# Patient Record
Sex: FEMALE | Race: BLACK OR AFRICAN AMERICAN | NOT HISPANIC OR LATINO | Employment: FULL TIME | URBAN - METROPOLITAN AREA
[De-identification: names, ages, dates, MRNs, and addresses within clinical notes are randomized per-mention and may not be internally consistent; named-entity substitution may affect disease eponyms.]

---
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
## 2021-03-15 DIAGNOSIS — G43.009 MIGRAINE WITHOUT AURA AND WITHOUT STATUS MIGRAINOSUS, NOT INTRACTABLE: ICD-10-CM

## 2021-03-15 NOTE — TELEPHONE ENCOUNTER
Received refill request for Nortriptyline from [facility]; Patient was last seen in October and has follow up appointment in April with Dr. Wilson. Pended to Dr. Wilson for signature and will be electronically sent to pharmacy if approved.    Daniela Medina RN

## 2021-03-16 RX ORDER — NORTRIPTYLINE HYDROCHLORIDE 50 MG/1
100 CAPSULE ORAL AT BEDTIME
Qty: 60 CAPSULE | Refills: 5 | Status: SHIPPED | OUTPATIENT
Start: 2021-03-16 | End: 2021-11-11

## 2021-03-19 ENCOUNTER — TELEPHONE (OUTPATIENT)
Dept: NEUROLOGY | Facility: CLINIC | Age: 21
End: 2021-03-19

## 2021-03-19 NOTE — TELEPHONE ENCOUNTER
[facility] Call Center    Phone Message    May a detailed message be left on voicemail: yes     Reason for Call: Order(s): Other:   Reason for requested: Sivan is calling on behalf of patient PCP Dr. Danae Pinon from [facility] requesting to have Dr. Morales/care team call her back. Patients PCP is wondering if there is a specific provider that would be recommended for patient to have neuropsych testing/evaluation for patients symptoms of inattention at school and though process related to her MS.    Please call to advise.    Action Taken: Message routed to:  [facility] ([facility]  MS    Travel Screening: Not Applicable

## 2021-03-22 NOTE — TELEPHONE ENCOUNTER
I left KATHRYN for Sivan advising that we do have a neuropsych team at our clinic that works with many of our patients, but that the patient doesn't necessarily need to be seen by them.    Kari Billy, MS RN Care Coordinator

## 2021-04-08 ENCOUNTER — ANCILLARY PROCEDURE (OUTPATIENT)
Dept: MRI IMAGING | Facility: CLINIC | Age: 21
End: 2021-04-08
Attending: PSYCHIATRY & NEUROLOGY
Payer: COMMERCIAL

## 2021-04-08 ENCOUNTER — OFFICE VISIT (OUTPATIENT)
Dept: NEUROLOGY | Facility: CLINIC | Age: 21
End: 2021-04-08
Attending: PSYCHIATRY & NEUROLOGY
Payer: COMMERCIAL

## 2021-04-08 VITALS — HEART RATE: 105 BPM | SYSTOLIC BLOOD PRESSURE: 93 MMHG | DIASTOLIC BLOOD PRESSURE: 67 MMHG | OXYGEN SATURATION: 100 %

## 2021-04-08 DIAGNOSIS — R25.1 TREMOR: ICD-10-CM

## 2021-04-08 DIAGNOSIS — R55 SYNCOPE, UNSPECIFIED SYNCOPE TYPE: ICD-10-CM

## 2021-04-08 DIAGNOSIS — G35 MS (MULTIPLE SCLEROSIS) (H): Primary | ICD-10-CM

## 2021-04-08 DIAGNOSIS — R53.82 CHRONIC FATIGUE: ICD-10-CM

## 2021-04-08 DIAGNOSIS — R26.9 GAIT DISTURBANCE: ICD-10-CM

## 2021-04-08 DIAGNOSIS — G35 MS (MULTIPLE SCLEROSIS) (H): ICD-10-CM

## 2021-04-08 DIAGNOSIS — M54.50 LOW BACK PAIN WITHOUT SCIATICA, UNSPECIFIED BACK PAIN LATERALITY, UNSPECIFIED CHRONICITY: ICD-10-CM

## 2021-04-08 DIAGNOSIS — R51.9 CHRONIC NONINTRACTABLE HEADACHE, UNSPECIFIED HEADACHE TYPE: ICD-10-CM

## 2021-04-08 DIAGNOSIS — G89.29 CHRONIC NONINTRACTABLE HEADACHE, UNSPECIFIED HEADACHE TYPE: ICD-10-CM

## 2021-04-08 LAB
ALBUMIN SERPL-MCNC: 3.4 G/DL (ref 3.4–5)
ALP SERPL-CCNC: 147 U/L (ref 40–150)
ALT SERPL W P-5'-P-CCNC: 24 U/L (ref 0–50)
ANISOCYTOSIS BLD QL SMEAR: ABNORMAL
AST SERPL W P-5'-P-CCNC: 12 U/L (ref 0–35)
BASOPHILS # BLD AUTO: 0.1 10E9/L (ref 0–0.2)
BASOPHILS NFR BLD AUTO: 0.9 %
BILIRUB DIRECT SERPL-MCNC: 0.1 MG/DL (ref 0–0.2)
BILIRUB SERPL-MCNC: 0.3 MG/DL (ref 0.2–1.3)
DIFFERENTIAL METHOD BLD: ABNORMAL
EOSINOPHIL # BLD AUTO: 0.1 10E9/L (ref 0–0.7)
EOSINOPHIL NFR BLD AUTO: 1.8 %
ERYTHROCYTE [DISTWIDTH] IN BLOOD BY AUTOMATED COUNT: 22.6 % (ref 10–15)
HCT VFR BLD AUTO: 39 % (ref 35–47)
HGB BLD-MCNC: 11.7 G/DL (ref 11.7–15.7)
HYPOCHROMIA BLD QL: PRESENT
LYMPHOCYTES # BLD AUTO: 3.3 10E9/L (ref 0.8–5.3)
LYMPHOCYTES NFR BLD AUTO: 57.9 %
MCH RBC QN AUTO: 22.5 PG (ref 26.5–33)
MCHC RBC AUTO-ENTMCNC: 30 G/DL (ref 31.5–36.5)
MCV RBC AUTO: 75 FL (ref 78–100)
MICROCYTES BLD QL SMEAR: PRESENT
MONOCYTES # BLD AUTO: 0.3 10E9/L (ref 0–1.3)
MONOCYTES NFR BLD AUTO: 6.1 %
NEUTROPHILS # BLD AUTO: 1.9 10E9/L (ref 1.6–8.3)
NEUTROPHILS NFR BLD AUTO: 33.3 %
PLATELET # BLD AUTO: 364 10E9/L (ref 150–450)
PLATELET # BLD EST: ABNORMAL 10*3/UL
POIKILOCYTOSIS BLD QL SMEAR: SLIGHT
POLYCHROMASIA BLD QL SMEAR: SLIGHT
PROT SERPL-MCNC: 7.7 G/DL (ref 6.8–8.8)
RBC # BLD AUTO: 5.2 10E12/L (ref 3.8–5.2)
WBC # BLD AUTO: 5.7 10E9/L (ref 4–11)

## 2021-04-08 PROCEDURE — 80076 HEPATIC FUNCTION PANEL: CPT | Performed by: PATHOLOGY

## 2021-04-08 PROCEDURE — 99000 SPECIMEN HANDLING OFFICE-LAB: CPT | Performed by: PATHOLOGY

## 2021-04-08 PROCEDURE — G0463 HOSPITAL OUTPT CLINIC VISIT: HCPCS

## 2021-04-08 PROCEDURE — 72156 MRI NECK SPINE W/O & W/DYE: CPT | Performed by: RADIOLOGY

## 2021-04-08 PROCEDURE — 85025 COMPLETE CBC W/AUTO DIFF WBC: CPT | Performed by: PATHOLOGY

## 2021-04-08 PROCEDURE — 99N1133 PR STATISTIC JC VIR AB INDEX INHIB: Mod: 90 | Performed by: PATHOLOGY

## 2021-04-08 PROCEDURE — A9585 GADOBUTROL INJECTION: HCPCS | Performed by: RADIOLOGY

## 2021-04-08 PROCEDURE — 70553 MRI BRAIN STEM W/O & W/DYE: CPT | Performed by: RADIOLOGY

## 2021-04-08 PROCEDURE — 36415 COLL VENOUS BLD VENIPUNCTURE: CPT | Performed by: PATHOLOGY

## 2021-04-08 PROCEDURE — 82306 VITAMIN D 25 HYDROXY: CPT | Performed by: PATHOLOGY

## 2021-04-08 PROCEDURE — 99214 OFFICE O/P EST MOD 30 MIN: CPT | Performed by: PSYCHIATRY & NEUROLOGY

## 2021-04-08 RX ORDER — LEVONORGESTREL/ETHIN.ESTRADIOL 0.1-0.02MG
1 TABLET ORAL DAILY
COMMUNITY
Start: 2021-03-17

## 2021-04-08 RX ORDER — GADOBUTROL 604.72 MG/ML
10 INJECTION INTRAVENOUS ONCE
Status: COMPLETED | OUTPATIENT
Start: 2021-04-08 | End: 2021-04-08

## 2021-04-08 RX ORDER — MODAFINIL 200 MG/1
TABLET ORAL
Qty: 30 TABLET | Refills: 5 | Status: SHIPPED | OUTPATIENT
Start: 2021-04-08 | End: 2021-04-21

## 2021-04-08 RX ORDER — FERROUS SULFATE 325(65) MG
325 TABLET ORAL DAILY
COMMUNITY
Start: 2021-03-16 | End: 2022-03-16

## 2021-04-08 RX ADMIN — GADOBUTROL 9 ML: 604.72 INJECTION INTRAVENOUS at 13:38

## 2021-04-08 NOTE — DISCHARGE INSTRUCTIONS
MRI Contrast Discharge Instructions    The IV contrast you received today will pass out of your body in your  urine. This will happen in the next 24 hours. You will not feel this process.  Your urine will not change color.    Drink at least 4 extra glasses of water or juice today (unless your doctor  has restricted your fluids). This reduces the stress on your kidneys.  You may take your regular medicines.    If you are on dialysis: It is best to have dialysis today.    If you have a reaction: Most reactions happen right away. If you have  any new symptoms after leaving the hospital (such as hives or swelling),  call your hospital at the correct number below. Or call your family doctor.  If you have breathing distress or wheezing, call 911.    Special instructions: ***    I have read and understand the above information.    Signature:______________________________________ Date:___________    Staff:__________________________________________ Date:___________     Time:__________    [place] [facility] Departments:    ___[place]: [phone]  ___[facility]: [phone]  ___[place]: [phone] ___[facility]: [phone]  ___[facility]: [phone]  ___[facility]: [phone]  ___[phone]  ___[facility]: [phone]  ___[phone]

## 2021-04-08 NOTE — LETTER
"4/8/2021      RE: Cindi Ferrer  [address]  [place] [address]     Referral source: Established patient     Chief complaint: Multiple sclerosis     History of the Present Illness: Ms. Cindi Ferrer is an 20 year old right-handed woman who is evaluated in the Multiple Sclerosis Clinic today for a scheduled follow up visit after MRI scans of the brain and cervical spine earlier today.    The patient's history is as per my previous notes.  Initial onset of symptoms of demyelinating disease was in 2017 when she had horizontal diplopia and left hemibody numbness.  MRI scan at that time demonstrated T2 hyperintense lesions in the brain and spinal cord suggestive of demyelinating disease of the type seen in multiple sclerosis and CSF examination was additionally positive for oligoclonal bands.  The patient was initiated on disease-modifying therapy with natalizumab and remains on that treatment.     Today, she denies any new episodic changes in vision, balance, strength or sensation suggestive of new MS relapse since her last visit to this clinic.    Symptomatically, she has a number of concerns today. 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 She describes a number of pre-syncopal and syncopal episodes.     She is bothered by increased frequency of migraine headaches, which are currently occurring 2-4 times per week despite use of nortriptyline 100 mg at bedtime.    She notes an increase in what she describes as \"full body tremors\". She feels that these are exacerbated by stress. She takes clonazepam 0.5 mg intermittently for this reason, but generally not more during the daytime as she does not like the way higher doses make her feel.    Regarding gait, she did undergo several physical therapy sessions since our last visit and found that this helped with balance. She does use a wheelchair on occasion due to lightheadedness and " fatigue.    She remains on modafinil 100 mg twice daily for fatigue symptoms, which continues to be helpful.    She also reports currently being bothered by sharp low back pain.    PHYSICAL EXAMINATION:  VITAL SIGNS: Blood pressure 93/67; pulse 105; oxygen saturation 100%.  GENERAL: Well nourished young adult female who presents to the examination accompanied by her father, awake and alert and in no acute distress.    Investigations: I reviewed MRI scans of the brain and cervical spine performed earlier on today's date, and the following is my independent interpretation of those images. There is no abnormal enhancement with gadolinium contrast throughout the brain and cord parenchyma. In the cervical cord, T2 hyperintense signal abnormality extends from the lower border of C2 to C5, with the appearance suggesting coalescence of several short segment demyelinating foci rather than one long, continuous lesion. This is unchanged from 10/24/2019. Likewise, brain MRI demonstrates stable appearance of multiple periventricular and juxtacortical foci of T2 hyperintensity in the white matter of the cerebral hemispheres bilaterally, also unchanged from 10/24/2019. There are no new lesions.    Assessment/plan:    1. Multiple sclerosis  The patient is clinically and radiologically stable with no evidence of active inflammatory demyelination on current disease modifying therapy with natalizumab. She will continue these treatments.     I will check routine laboratory studies for monitoring of this medication today to include blood counts, liver function tests, and a ADRIANA virus serology. I will also obtain a vitamin D level and advise the patient on supplementation with vitamin D as needed to maintain her level within the goal range of 60-80 mcg/L.     I will see her back for a review in 6 months.    2. Syncope  Her blood pressures are quite low and symptoms seem to relate to orthostasis. I outlined a program of management including  increasing fluid and salt intake and trying an elastic abdominal binder.     She is also currently on iron supplementation for iron deficiency anemia, which is likely a contributory factor as well.    3. Chronic migraine  Her headache frequency is approaching a point at which she could be a candidate for botulinum toxin injections. One of the newer CGRP antagonists could be an option as well. I am going to refer her to the headache clinic for evaluation in this regard. In the interim, she will remain on nortriptyline at the current dose.    4. Tremors  Psychological stress has played a prominent role in exacerbating her symptoms in this regard. I do not think that a daily prescription would be helpful here. She can continue use of clonazepam intermittently.    5. Gait disturbance  She feels that physical therapy was helpful in improving her balance. We will continue to keep an eye on her symptoms in this regard over time.    6. Chronic fatigue  We will refill her prescription for modafinil 200 mg tablets, taking one half tablet twice daily as needed for fatigue.    7. Low back pain  There are no radicular features or other red flag signs. For now, we will follow conservatively but will consider repeat physical therapy or sports medicine referral if this is not improving.     I spent a total of 31 minutes on patient care activities related to this encounter today, including time spent in reviewing the chart, reviewing imaging, and obtaining history from and counseling the patient.     Coy Wilson MD   of Neurology  [facility]    Cc:  Danae Pinon MD (PCP)  Patient

## 2021-04-08 NOTE — NURSING NOTE
Chief Complaint   Patient presents with     Multiple Sclerosis     [facility] RETURN - MULTIPLE SCLEROSIS F/U     Ivette Herrera, EMT

## 2021-04-09 LAB — DEPRECATED CALCIDIOL+CALCIFEROL SERPL-MC: 59 UG/L (ref 20–75)

## 2021-04-16 ENCOUNTER — MEDICAL CORRESPONDENCE (OUTPATIENT)
Dept: HEALTH INFORMATION MANAGEMENT | Facility: CLINIC | Age: 21
End: 2021-04-16

## 2021-04-20 DIAGNOSIS — G35 MULTIPLE SCLEROSIS (H): ICD-10-CM

## 2021-04-20 DIAGNOSIS — R53.82 CHRONIC FATIGUE: ICD-10-CM

## 2021-04-20 LAB — LAB SCANNED RESULT: ABNORMAL

## 2021-04-21 RX ORDER — MODAFINIL 200 MG/1
TABLET ORAL
Qty: 30 TABLET | Refills: 5 | Status: SHIPPED | OUTPATIENT
Start: 2021-04-21 | End: 2022-03-08

## 2021-04-21 NOTE — TELEPHONE ENCOUNTER
Patient requesting refill of their modafinil; Patient was last seen in April and has follow up appointment in October with Dr. Wilson. Pended rx to Dr. Wilson for signature and will send electronically to the pharmacy once signed.    Daniela Medina RN

## 2021-04-23 ENCOUNTER — TELEPHONE (OUTPATIENT)
Dept: NEUROLOGY | Facility: CLINIC | Age: 21
End: 2021-04-23

## 2021-04-23 NOTE — TELEPHONE ENCOUNTER
[facility] Call Center    Phone Message    May a detailed message be left on voicemail: no     Reason for Call: Other: Kiarra calling to inform Dr. Wilson that patient missed infusion appointment today. Please call to discuss.     Action Taken: Other: [facility] neurology     Travel Screening: Not Applicable

## 2021-04-25 ENCOUNTER — HEALTH MAINTENANCE LETTER (OUTPATIENT)
Age: 21
End: 2021-04-25

## 2021-04-26 NOTE — TELEPHONE ENCOUNTER
I called Kiarra back at the infusion center; She will be reaching out to the patient to get her Tysabri infusion rescheduled for this week; Coincidentally, our clinic received a fax from [facility] asking some additional information questions for Tysabri PA renewal; These questions have been answered and faxed back to [facility].    Kari Billy, MS RN Care Coordinator

## 2021-05-08 ENCOUNTER — MYC MEDICAL ADVICE (OUTPATIENT)
Dept: NEUROLOGY | Facility: CLINIC | Age: 21
End: 2021-05-08

## 2021-05-08 NOTE — PROGRESS NOTES
"Referral source: Established patient     Chief complaint: Multiple sclerosis     History of the Present Illness: Ms. Cindi Ferrer is an 20 year old right-handed woman who is evaluated in the Multiple Sclerosis Clinic today for a scheduled follow up visit after MRI scans of the brain and cervical spine earlier today.    The patient's history is as per my previous notes.  Initial onset of symptoms of demyelinating disease was in 2017 when she had horizontal diplopia and left hemibody numbness.  MRI scan at that time demonstrated T2 hyperintense lesions in the brain and spinal cord suggestive of demyelinating disease of the type seen in multiple sclerosis and CSF examination was additionally positive for oligoclonal bands.  The patient was initiated on disease-modifying therapy with natalizumab and remains on that treatment.     Today, she denies any new episodic changes in vision, balance, strength or sensation suggestive of new MS relapse since her last visit to this clinic.    Symptomatically, she has a number of concerns today. 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 She describes a number of pre-syncopal and syncopal episodes.     She is bothered by increased frequency of migraine headaches, which are currently occurring 2-4 times per week despite use of nortriptyline 100 mg at bedtime.    She notes an increase in what she describes as \"full body tremors\". She feels that these are exacerbated by stress. She takes clonazepam 0.5 mg intermittently for this reason, but generally not more during the daytime as she does not like the way higher doses make her feel.    Regarding gait, she did undergo several physical therapy sessions since our last visit and found that this helped with balance. She does use a wheelchair on occasion due to lightheadedness and fatigue.    She remains on modafinil 100 mg twice daily for fatigue " symptoms, which continues to be helpful.    She also reports currently being bothered by sharp low back pain.    PHYSICAL EXAMINATION:  VITAL SIGNS: Blood pressure 93/67; pulse 105; oxygen saturation 100%.  GENERAL: Well nourished young adult female who presents to the examination accompanied by her father, awake and alert and in no acute distress.    Investigations: I reviewed MRI scans of the brain and cervical spine performed earlier on today's date, and the following is my independent interpretation of those images. There is no abnormal enhancement with gadolinium contrast throughout the brain and cord parenchyma. In the cervical cord, T2 hyperintense signal abnormality extends from the lower border of C2 to C5, with the appearance suggesting coalescence of several short segment demyelinating foci rather than one long, continuous lesion. This is unchanged from 10/24/2019. Likewise, brain MRI demonstrates stable appearance of multiple periventricular and juxtacortical foci of T2 hyperintensity in the white matter of the cerebral hemispheres bilaterally, also unchanged from 10/24/2019. There are no new lesions.    Assessment/plan:    1. Multiple sclerosis  The patient is clinically and radiologically stable with no evidence of active inflammatory demyelination on current disease modifying therapy with natalizumab. She will continue these treatments.     I will check routine laboratory studies for monitoring of this medication today to include blood counts, liver function tests, and a ADRIANA virus serology. I will also obtain a vitamin D level and advise the patient on supplementation with vitamin D as needed to maintain her level within the goal range of 60-80 mcg/L.     I will see her back for a review in 6 months.    2. Syncope  Her blood pressures are quite low and symptoms seem to relate to orthostasis. I outlined a program of management including increasing fluid and salt intake and trying an elastic abdominal  binder.     She is also currently on iron supplementation for iron deficiency anemia, which is likely a contributory factor as well.    3. Chronic migraine  Her headache frequency is approaching a point at which she could be a candidate for botulinum toxin injections. One of the newer CGRP antagonists could be an option as well. I am going to refer her to the headache clinic for evaluation in this regard. In the interim, she will remain on nortriptyline at the current dose.    4. Tremors  Psychological stress has played a prominent role in exacerbating her symptoms in this regard. I do not think that a daily prescription would be helpful here. She can continue use of clonazepam intermittently.    5. Gait disturbance  She feels that physical therapy was helpful in improving her balance. We will continue to keep an eye on her symptoms in this regard over time.    6. Chronic fatigue  We will refill her prescription for modafinil 200 mg tablets, taking one half tablet twice daily as needed for fatigue.    7. Low back pain  There are no radicular features or other red flag signs. For now, we will follow conservatively but will consider repeat physical therapy or sports medicine referral if this is not improving.     I spent a total of 31 minutes on patient care activities related to this encounter today, including time spent in reviewing the chart, reviewing imaging, and obtaining history from and counseling the patient.

## 2021-05-13 ENCOUNTER — MEDICAL CORRESPONDENCE (OUTPATIENT)
Dept: HEALTH INFORMATION MANAGEMENT | Facility: CLINIC | Age: 21
End: 2021-05-13

## 2021-05-20 ENCOUNTER — VIRTUAL VISIT (OUTPATIENT)
Dept: NEUROLOGY | Facility: CLINIC | Age: 21
End: 2021-05-20
Attending: PSYCHIATRY & NEUROLOGY
Payer: COMMERCIAL

## 2021-05-20 DIAGNOSIS — R51.9 CHRONIC NONINTRACTABLE HEADACHE, UNSPECIFIED HEADACHE TYPE: ICD-10-CM

## 2021-05-20 DIAGNOSIS — G89.29 CHRONIC NONINTRACTABLE HEADACHE, UNSPECIFIED HEADACHE TYPE: ICD-10-CM

## 2021-05-20 DIAGNOSIS — G43.009 MIGRAINE WITHOUT AURA AND WITHOUT STATUS MIGRAINOSUS, NOT INTRACTABLE: Primary | ICD-10-CM

## 2021-05-20 PROCEDURE — 99215 OFFICE O/P EST HI 40 MIN: CPT | Mod: 95 | Performed by: NURSE PRACTITIONER

## 2021-05-20 RX ORDER — NAPROXEN 500 MG/1
500 TABLET ORAL EVERY 12 HOURS PRN
Qty: 30 TABLET | Refills: 6 | Status: SHIPPED | OUTPATIENT
Start: 2021-05-20 | End: 2023-05-11

## 2021-05-20 RX ORDER — PROCHLORPERAZINE MALEATE 5 MG
5-10 TABLET ORAL EVERY 6 HOURS PRN
Qty: 20 TABLET | Refills: 3 | Status: SHIPPED | OUTPATIENT
Start: 2021-05-20 | End: 2022-07-06

## 2021-05-20 RX ORDER — SUMATRIPTAN 50 MG/1
50-100 TABLET, FILM COATED ORAL
Qty: 12 TABLET | Refills: 9 | Status: SHIPPED | OUTPATIENT
Start: 2021-05-20 | End: 2023-05-11

## 2021-05-20 NOTE — PROGRESS NOTES
"Cindi is a 20 year old who is being evaluated via a billable video visit.      How would you like to obtain your AVS? Mychart  If the video visit is dropped, the invitation should be resent by: [phone]      Video Start Time: 11:40 AM  Video-Visit Details    Type of service:  Video Visit    Video End Time:12:29 PM    Originating Location (pt. Location): Home    Distant Location (provider location):  [facility] [place]     Platform used for Video Visit: Scrybe      ASSESSMENT AND PLAN   Headache, reported headache symptoms most likely consistent with chronic migraine phenotype. Possible genetic in etiology-mother has headaches.  Patient is already on nortriptyline and gabapentin so would avoid adding other anticonvalsants or antihypertensive. History of hypotension (last clinic visit BP under 100) and syncope so would avoid BBB or CCB.  Discussed chronic migraine preventative options and patient is  interested in CGRPs -because of convenience and no need to travel to clinic to get the injections. Discussed a trial of Emgality. Side effects  known and unknown reviewed. Patient accepts that risk and  is aware that Emgality is a newly FDA approved medication for chronic migraine headache prevention and clinical experience is limited.   Acute migraine headache treatment:   A trial of sumatriptan as needed for acute severe headache discussed. Serotonin syndrome discussed. Limit use of sumatriptan to no more than 9 days per month.   Mild to moderate headache May try naproxen 500 mg every 12 hours as needed. Limit use to no no more than 14 days per month. Naproxen  Prochlorperazine as needed for nausea. Side effects-drowsiness, jittery feeling, increased anxiety, tremor.  Follow up in 2-3 months after starting Emgality or sooner if needed       Subjective   Cindi is a 20 year old who presents for the following health issues headache     HPI   A \"small headaches\" when was growing up but a change " in headache pattern in July of 2017.   Headaches since July of 2017 -one day after her spinal tap and headaches wouldn't stop and worse with laying down. Sitting up would help. Associated with sensitivity to light and nausea. Headaches persistent for 4 years. Was started on nortriptyline and initially would help. Headaches have been re-occurring and nortriptyline was increased three times and dose would increase  Mid March 2021 headaches worsen again. Pain is localized to the top and sides and feels sharp and severe enough to make patient nauseous and photophobia. Reports that headache duration of more intense pain about 30 minutes before acute med -Excedrin kicks in. Headache is usually gradually increases and duration for a couple of hours if treated and if untreated -many hours.   Headache treatment -sits quietly until headache passes or small enough to lay down. Headache and nausea gets worse with laying down so waits until Excedrin helps.   No neck pain or stiffness  No autonomic features-lacrimation, nasal congestion  Vision blurry/double chronic since MS diagnosis  Headache frequency 5 out of 7 days =20 +headaches per month. Does not wake up with headaches  School has been stressful -on line [employer] and interested in Bio Engineering      Gets roughly about 6 hours of sleep     Mother has history of migraine headaches    No anxiety or depression concerns.     PMH:  MS and on disease-modifying therapy with natalizumab and remains on that treatment. Has been seeing Dr Wilson  Syncope and low BP and orthostatic at times  Anemia -iron def and on iron supplements      Current treatment  Gabapentin 300 mg three times daily   Nortriptyline 100 mg at bedtime       Review of Systems   Constitutional, HEENT, cardiovascular, pulmonary, gi and gu systems are negative, except as otherwise noted.      Objective       Vitals:  No vitals were obtained today due to virtual  visit.    Physical Exam   GENERAL: Healthy, alert and no distress  EYES: Eyes grossly normal to inspection.  No discharge or erythema, or obvious scleral/conjunctival abnormalities.  RESP: No audible wheeze, cough, or visible cyanosis.  No visible retractions or increased work of breathing.    SKIN: Visible skin clear. No significant rash, abnormal pigmentation or lesions.  NEURO: Facial expressions intact, no apparent weakness. PSYCH: Mentation appears normal, affect normal/bright, judgement and insight intact, normal speech and appearance well-groomed.    Diagnostic Test Results:  MR CERVICAL SPINE W/O & W CONTRAST 4/8/2021 1:40 PM     Provided History: MS; MS (multiple sclerosis) (H)  ICD-10: MS (multiple sclerosis) (H)     Comparison:     Technique: Sagittal T1-weighted, sagittal T2-weighted, sagittal  diffusion weighted, axial T2-weighted, and axial T2* gradient echo  images of the cervical spine were obtained without intravenous  contrast. Following intravenous administration of gadolinium, axial  and sagittal T1-weighted images with fat saturation were also  obtained.     Contrast: 9cc's Gadavist     Findings:  The cervical vertebrae are normally aligned.  There is no disc height  narrowing at any level.  Beginning at approximately the level of C2-3  extending to the level of the upper endplate of C5 there is patchy and  confluent T2 hyperintensity within the cervical cord that is unchanged  from the prior study..  There is no abnormal contrast enhancement  within the cervical spinal cord, thecal sac or vertebral column.  The  findings on a level by level basis are as follows:     C2-3:  No spinal canal or neural foraminal narrowing.     C3-4:  No spinal canal or neural foraminal narrowing.     C4-5:  No spinal canal or neural foraminal narrowing.     C5-6:  No spinal canal or neural foraminal narrowing.     C6-7:  No spinal canal or neural foraminal narrowing.     C7-T1:  No spinal canal or neural foraminal  narrowing.     No abnormality of the paraspinous soft tissues.                                                                      Impression:   1. No significant change in the patchy and confluent T2 hyperintensity  within the cervical cord from about the level of C2-3 through the  level of the upper endplate of C5.  2. No abnormal enhancement to suggest active demyelination.     GAVIOTA IVAN MD    Brain MR without and with contrast      History: MS; MS (multiple sclerosis) (H).   ICD-10: MS (multiple sclerosis) (H)  Comparison: 10/24/2019      Technique:   Brain MR: Sagittal thin FLAIR and axial T1-weighted and FLAIR images  were obtained without intravenous contrast. Following intravenous  gadolinium-based contrast administration, axial diffusion,  susceptibility, T2-weighted, T1-weighted, and coronal T1-weighted and  FLAIR images were obtained.     Contrast: 9cc's Gadavist     Findings: No mass lesion, midline shift, or abnormal intraaxial or  extraaxial fluid collection. There are, however, 10-15 foci of  T2-hyperintensity within the cerebral white matter that raise the  question of underlying demyelinating disease. Specifically, there are  lesions within the right middle cerebellar peduncle, brainstem, and  periventricular white matter. The T1-weighted images do not show any  areas of severe hypointensity. There is no significant cerebral  atrophy. Following the administration of intravenous contrast, there  are no foci of abnormal contrast enhancement noted. Compared to the  previous study, there has been no significant interval change.     The major intracranial vascular flow-voids do appear patent.  Incidental cavum septi pellucidi et vergae. There are no significant  abnormalities in the visualized portions of the mastoid air cells or  paranasal sinuses.                                                                      Impression:   1. The study demonstrates 10-15 foci of T2-hyperintensity within  the  cerebral white matter consistent with the clinical suspicion of  demyelinating disease. There are no foci of abnormal enhancement noted  intracranially .   2. There is no significant interval change from the prior study.     GAVIOTA IVAN MD  Video-Visit Details    Type of service:  Video Visit    Video End Time:12:31 pm    Originating Location (pt. Location): Home    Distant Location (provider location):  [facility] [place]     Platform used for Video Visit: Radha     I discussed all my recommendations with Cindi Ferrer who verbalizes understanding and comfortable with the plan.  All of patient's questions were answered from the best of my knowledge.  Patient is in agreement with the plan.     52 minutes spent on the date of the encounter doing chart review, history and exam, documentation and further activities as noted above    EDISON Chris, CNP [facility]  Headache certified  [facility]

## 2021-05-20 NOTE — PATIENT INSTRUCTIONS
[facility] -Emgality due to convenience and no need to travel to clinic to get the injections. In meanwhile will submit PA for Emgality and continue nortriptyline for now. Side effects known and unknown reviewed.   Acute migraine headache treatment: would recommend alternatives to Excedrin and ideally reduce Excedrin use.   A trial of sumatriptan as needed for acute severe headache discussed. Serotonin syndrome discussed. Limit use of sumatriptan to no more than 9 days per month.   Mild to moderate headache May try naproxen 500 mg every 12 hours as needed. Limit use to no no more than 14 days per month. Naproxen  Prochlorperazine as needed for nausea. Side effects-drowsiness, jittery feeling, increased anxiety, tremor.   Follow up in 2-3 months after starting Emgality or sooner if needed       Patient Education     Emgality Auto-Injector 120 mg/mL  Uses  This medicine is used for the following purposes:    prevent migraine headaches    severe headache  Instructions  This medicine will be given to you at home.  This medicine is used by injecting it into the skin. Please ask your doctor, nurse or pharmacist for the correct places on your body where this medicine can be injected.  Carefully follow the instructions for preparing this medicine before injection.  Read and make sure you understand the instructions for measuring your dose and using the syringe before using this medicine.  Always inspect the medicine before using.  The liquid should be clear or light yellow.  Do not use the medicine if it contains any particles or if it has changed color.  Do not shake the medicine before using.  Keep this medicine in the refrigerator. Do not freeze.  Keep the medicine in its original container.  Protect medicine from light.  Take the medicine out of the refrigerator about 30 minutes before use to warm to room temperature.  Discard unused medicine after 7 days at room temperature.  Never use any medicine that has  .  Discard any remaining medicine after your dose is given.  Please ask your doctor, nurse, or pharmacist how to discard unused medicines safely.  Wash your hands before and after handling this medicine.  Ask your doctor, nurse or pharmacist to show you how to use this medicine correctly.  You or a family member can be trained to give this medicine at home.  Do not inject into or near a vein, artery or nerve.  Avoid injecting the medicine within 2 inches around the navel.  Do not inject into skin that is red, swollen or itchy.  If your dose is 2 syringes or injections, inject each in 2 different locations.  Change the location of the injection each time. Choose a location at least 1 inch from the last injection.  Do not rub or massage the area where the injection was given.  If you forget to take a dose on time, take it as soon as you remember. If it is almost time for the next dose, do not take the missed dose. Return to your normal dosing schedule. Do not take 2 doses of this medicine at one time.  Please tell your doctor and pharmacist about all the medicines you take. Include both prescription and over-the-counter medicines. Also tell them about any vitamins, herbal medicines, or anything else you take for your health.  If your symptoms do not improve or they worsen while on this medicine, contact your doctor.  Cautions  Tell your doctor and pharmacist if you ever had an allergic reaction to a medicine. Symptoms of an allergic reaction can include trouble breathing, skin rash, itching, swelling, or severe dizziness.  Do not use the medication any more than instructed.  Tell the doctor or pharmacist if you are pregnant, planning to be pregnant, or breastfeeding.  Ask your pharmacist if this medicine can interact with any of your other medicines. Be sure to tell them about all the medicines you take.  Please tell all your doctors and dentists that you are on this medicine before they provide care.  Do not  start or stop any other medicines without first speaking to your doctor or pharmacist.  Used needles and syringes should be thrown away properly in a medical waste container. Ask your doctor or pharmacist if you need help.  Do not share this medicine with anyone who has not been prescribed this medicine.  Side Effects  The following is a list of some common side effects from this medicine. Please speak with your doctor about what you should do if you experience these or other side effects.    reaction at the area of the injection (pain, redness, swelling)  A few people may have an allergic reactions to this medicine. Symptoms can include difficulty breathing, skin rash, itching, swelling, or severe dizziness. If you notice any of these symptoms, seek medical help quickly.  Extra  Please speak with your doctor, nurse, or pharmacist if you have any questions about this medicine.  [email or web address]  IMPORTANT NOTE: This document tells you briefly how to take your medicine, but it does not tell you all there is to know about it.Your doctor or pharmacist may give you other documents about your medicine. Please talk to them if you have any questions.Always follow their advice. There is a more complete description of this medicine available in English.Scan this code on your smartphone or tablet or use the web address below. You can also ask your pharmacist for a printout. If you have any questions, please ask your pharmacist.     2021 [employer].           Patient Education     Sumatriptan Oral Tablet 50 mg  Uses  For headache.  Instructions  This medicine may be taken with or without food.  Swallow with a full glass (8 oz) of water unless your doctor gives you different instructions.  Store at room temperature in a dry place. Do not keep in the bathroom.  Keep the medicine away from heat and light.  This medicine should be used after a migraine headache starts. It should not be used  to prevent a migraine headache.  If your headache improves but does not completely disappear after taking this medicine, you may need a second dose.  If you need to take a second dose, wait at least 2 hours after taking your first dose.  Please tell your doctor and pharmacist about all the medicines you take. Include both prescription and over-the-counter medicines. Also tell them about any vitamins, herbal medicines, or anything else you take for your health.  If your symptoms do not improve or they worsen while on this medicine, contact your doctor.  You may stop using this medicine if you no longer have symptoms.  It is very important that you keep all appointments for medical exams and tests while on this medicine.  Do not take the medicine more than twice during 24 hours.  Cautions  Tell your doctor and pharmacist if you ever had an allergic reaction to a medicine. Symptoms of an allergic reaction can include trouble breathing, skin rash, itching, swelling, or severe dizziness.  Some patients with weak hearts may have worsening of symptoms. If you notice difficulty breathing, weight gain, or swelling of your legs or ankles, let your doctor know right away.  This medicine is associated with a rare but very serious medical condition. Please speak with your doctor about symptoms you should look out for while on this medicine. Notify your doctor immediately if you develop those symptoms.  Some patients taking this medicine have experienced serious side effects. Please speak with your doctor to understand the risks and benefits associated with this medicine.  This medicine is associated with an increased risk of serious heart problems, heart attack, and stroke. Please speak with your doctor about the risks and benefits of using this medicine. Contact your doctor immediately if you experience chest pain or difficulty breathing.  Do not use the medication any more than instructed.  Your ability to stay alert or to  react quickly may be impaired by this medicine. Do not drive or operate machinery until you know how this medicine will affect you.  Please check with your doctor before drinking alcohol while on this medicine.  Tell the doctor or pharmacist if you are pregnant, planning to be pregnant, or breastfeeding.  Do not take Malcom's wort while on this medicine.  Ask your pharmacist if this medicine can interact with any of your other medicines. Be sure to tell them about all the medicines you take.  Please tell all your doctors and dentists that you are on this medicine before they provide care.  Do not start or stop any other medicines without first speaking to your doctor or pharmacist.  If you have had a heart attack or a stroke within the past 6 months, talk to your doctor before using this medicine.  Do not share this medicine with anyone who has not been prescribed this medicine.  Side Effects  The following is a list of some common side effects from this medicine. Please speak with your doctor about what you should do if you experience these or other side effects.    dizziness    drowsiness or sedation    lack of energy and tiredness    feeling of heat or flushing    high blood pressure    feeling of numbness or tingling in your hands and feet  If you have any of the following side effects, you may be getting too much medicine. Please contact your doctor to let them know about these side effects.    fainting    hallucinations (unusual thoughts, seeing or hearing things that are not real)  Call your doctor or get medical help right away if you notice any of these more serious side effects:    loss of balance    chest pain    severe, watery or bloody diarrhea    swelling of the legs, feet, and hands    high fever    cold hands or feet    fast or irregular heart beats    mood changes    tight or rigid muscles    pale or blue skin, lips or fingernails    seizures    shortness of breath    stomach pain    symptoms of  stroke (such as one-sided weakness, slurred speech, confusion)    severe or persistent vomiting  A few people may have an allergic reactions to this medicine. Symptoms can include difficulty breathing, skin rash, itching, swelling, or severe dizziness. If you notice any of these symptoms, seek medical help quickly.  Extra  Please speak with your doctor, nurse, or pharmacist if you have any questions about this medicine.  [email or web address]  IMPORTANT NOTE: This document tells you briefly how to take your medicine, but it does not tell you all there is to know about it.Your doctor or pharmacist may give you other documents about your medicine. Please talk to them if you have any questions.Always follow their advice. There is a more complete description of this medicine available in English.Scan this code on your smartphone or tablet or use the web address below. You can also ask your pharmacist for a printout. If you have any questions, please ask your pharmacist.     2021 [employer].           Patient Education     Compazine Oral Tablet 5 mg  Uses  For nausea or vomiting.  Instructions  This medicine may be taken with or without food.  Store at room temperature in a dry place. Do not keep in the bathroom.  Keep the medicine away from heat and light.  Drink plenty of water while on this medicine.  This medicine may cause you to become more sensitive to the sun. Use sunscreen or wear protective clothing when you are exposed to the sun.  If you forget to take a dose on time, take it as soon as you remember. If it is almost time for the next dose, do not take the missed dose. Return to your normal dosing schedule. Do not take 2 doses of this medicine at one time.  Please tell your doctor and pharmacist about all the medicines you take. Include both prescription and over-the-counter medicines. Also tell them about any vitamins, herbal medicines, or anything else you take for your  health.  If your symptoms do not improve or they worsen while on this medicine, contact your doctor.  Do not suddenly stop taking this medicine. Check with your doctor before stopping.  It is very important that you follow your doctor's instructions for all blood tests.  Cautions  Tell your doctor and pharmacist if you ever had an allergic reaction to a medicine. Symptoms of an allergic reaction can include trouble breathing, skin rash, itching, swelling, or severe dizziness.  This medicine is associated with a rare but very serious medical condition. Please speak with your doctor about symptoms you should look out for while on this medicine. Notify your doctor immediately if you develop those symptoms.  Some patients taking this medicine have experienced serious side effects. Please speak with your doctor to understand the risks and benefits associated with this medicine.  Do not use the medication any more than instructed.  This medicine may cause dizziness or fainting, especially after exercising or in hot weather. Be very careful when standing or sitting up quickly.  Your ability to stay alert or to react quickly may be impaired by this medicine. Do not drive or operate machinery until you know how this medicine will affect you.  Please check with your doctor before drinking alcohol while on this medicine.  If you drink more than a few alcoholic beverages each day, ask your doctor whether you should be on this medicine.  Avoid becoming overheated during exercise or other activities. Try to stay cool in hot weather.  Contact your doctor if you notice a change in the amount or darkening of your urine.  Call the doctor if there are any signs of confusion or unusual changes in behavior.  Tell the doctor or pharmacist if you are pregnant, planning to be pregnant, or breastfeeding.  Ask your pharmacist if this medicine can interact with any of your other medicines. Be sure to tell them about all the medicines you  take.  Do not start or stop any other medicines without first speaking to your doctor or pharmacist.  Do not share this medicine with anyone who has not been prescribed this medicine.  Side Effects  The following is a list of some common side effects from this medicine. Please speak with your doctor about what you should do if you experience these or other side effects.    constipation    dizziness    drowsiness or sedation    dry mouth    low blood pressure    liver problems    menstruation changes (missed or fewer periods)    skin irritation such as redness, itching, rash, or burning    problems with sexual functions or desire    increased risk of sunburn    vivid dreams or nightmares  If you have any of the following side effects, you may be getting too much medicine. Please contact your doctor to let them know about these side effects.    agitated feeling or trouble sleeping    loss of balance    difficulty concentrating    confusion    muscle trembling    muscle weakness    restlessness    difficulty swallowing    unsteadiness while walking    difficulty or discomfort urinating    blurring or changes of vision  Call your doctor or get medical help right away if you notice any of these more serious side effects:    decreased awareness or responsiveness    high fever    fast or irregular heart beats    symptoms of liver damage (such as yellowing of skin or eyes, dark urine, unusual tiredness or weakness; severe stomach or back pain)    uncontrollable movement of face, tongue, arms or legs    muscle aches, spasms or abnormal movements    tight or rigid muscles    seizures    light colored stool    unusual or unexplained tiredness or weakness  A few people may have an allergic reactions to this medicine. Symptoms can include difficulty breathing, skin rash, itching, swelling, or severe dizziness. If you notice any of these symptoms, seek medical help quickly.  Extra  Please speak with your doctor, nurse, or  pharmacist if you have any questions about this medicine.  [email or web address]  IMPORTANT NOTE: This document tells you briefly how to take your medicine, but it does not tell you all there is to know about it.Your doctor or pharmacist may give you other documents about your medicine. Please talk to them if you have any questions.Always follow their advice. There is a more complete description of this medicine available in English.Scan this code on your smartphone or tablet or use the web address below. You can also ask your pharmacist for a printout. If you have any questions, please ask your pharmacist.     2021 [employer].           Patient Education     Naproxen Oral Tablet 500 mg  Uses  This medicine is used for the following purposes:    fever    inflammatory disease    pain  Instructions  Take the medicine with 250 mL (1 cup) of water.  Sit or stand upright for 30 minutes after taking the medicine. Do not lie down.  You may take with food to prevent stomach upset.  Store at room temperature in a dry place. Do not keep in the bathroom.  Keep the medicine away from heat and light.  This medicine may cause you to become more sensitive to the sun. Use sunscreen or wear protective clothing when you are exposed to the sun.  If you forget to take a dose on time, take it as soon as you remember. If it is almost time for the next dose, do not take the missed dose. Return to your normal dosing schedule. Do not take 2 doses of this medicine at one time.  Please tell your doctor and pharmacist about all the medicines you take. Include both prescription and over-the-counter medicines. Also tell them about any vitamins, herbal medicines, or anything else you take for your health.  Cautions  Tell your doctor and pharmacist if you ever had an allergic reaction to a medicine. Symptoms of an allergic reaction can include trouble breathing, skin rash, itching, swelling, or severe  dizziness.  Some patients with weak hearts may have worsening of symptoms. If you notice difficulty breathing, weight gain, or swelling of your legs or ankles, let your doctor know right away.  This medicine is associated with an increased risk of serious heart problems, heart attack, and stroke. Please speak with your doctor about the risks and benefits of using this medicine. Contact your doctor immediately if you experience chest pain or difficulty breathing.  This medicine may cause serious bleeding from the stomach or bowels. Stop this medicine and call your doctor immediately if you see any signs of bleeding. Bleeding can cause pain in the stomach, vomiting up liquid that looks like coffee grounds, and red or dark tarry stools.  Do not use the medication any more than instructed.  Your ability to stay alert or to react quickly may be impaired by this medicine. Do not drive or operate machinery until you know how this medicine will affect you.  Speak with your doctor before taking any medicine with aspirin.  Please check with your doctor before drinking alcohol while on this medicine.  Contact your doctor if you notice a change in the amount or darkening of your urine.  Tell the doctor or pharmacist if you are pregnant, planning to be pregnant, or breastfeeding.  This medicine can hurt a new baby in the womb. If you become pregnant while on this medicine, tell your doctor immediately. Your doctor may switch you to a different medicine.  Ask your pharmacist if this medicine can interact with any of your other medicines. Be sure to tell them about all the medicines you take.  Please tell all your doctors and dentists that you are on this medicine before they provide care.  Do not start or stop any other medicines without first speaking to your doctor or pharmacist.  Call your doctor right away if you notice any unusual bleeding or bruising.  Do not share this medicine with anyone who has not been prescribed this  medicine.  This medicine can cause serious side effects in some patients. Important information from the [place] Food and Drug Administration (FDA) is available from your pharmacist. Please review it carefully with your pharmacist to understand the risks associated with this medicine.  Side Effects  The following is a list of some common side effects from this medicine. Please speak with your doctor about what you should do if you experience these or other side effects.    increased risk of bleeding    bloating    constipation    diarrhea    dizziness    drowsiness or sedation    excess gas    high blood pressure    nausea    skin irritation such as redness, itching, rash, or burning    ringing in the ears    stomach upset or abdominal pain    increased risk of sunburn    vomiting  Call your doctor or get medical help right away if you notice any of these more serious side effects:    unusual bruising or discoloration on skin    chest pain    coughing up blood or vomit that looks like coffee grounds    swelling of the legs, feet, and hands    symptoms of liver damage (such as yellowing of skin or eyes, dark urine, unusual tiredness or weakness; severe stomach or back pain)    seizures    shortness of breath    slurred speech    dark, tarry stool    urinating less often    severe or persistent vomiting    weakness on one side of the body    sudden or unexplained weight gain  A few people may have an allergic reactions to this medicine. Symptoms can include difficulty breathing, skin rash, itching, swelling, or severe dizziness. If you notice any of these symptoms, seek medical help quickly.  Extra  Please speak with your doctor, nurse, or pharmacist if you have any questions about this medicine.  [email or web address]  IMPORTANT NOTE: This document tells you briefly how to take your medicine, but it does not tell you all there is to know about it.Your doctor or pharmacist may give you other  documents about your medicine. Please talk to them if you have any questions.Always follow their advice. There is a more complete description of this medicine available in English.Scan this code on your smartphone or tablet or use the web address below. You can also ask your pharmacist for a printout. If you have any questions, please ask your pharmacist.     2021 [employer].

## 2021-05-20 NOTE — LETTER
5/20/2021       RE: Cindi Ferrer  [address]  [place] [address]     Dear Colleague,    Thank you for referring your patient, Cindi Ferrer, to the [facility] [place] at [facility]. Please see a copy of my visit note below.    Cindi is a 20 year old who is being evaluated via a billable video visit.      How would you like to obtain your AVS? Mychart  If the video visit is dropped, the invitation should be resent by: [phone]    Video-Visit Details    Type of service:  Video Visit    Video Start Time: 11:40 AM    Video End Time:12:29 PM    Originating Location (pt. Location): Home    Distant Location (provider location):  [facility] [place]     Platform used for Video Visit: Jenn Rykert      ASSESSMENT AND PLAN   Headache, reported headache symptoms most likely consistent with chronic migraine phenotype. Possible genetic in etiology-mother has headaches.  Patient is already on nortriptyline and gabapentin so would avoid adding other anticonvalsants or antihypertensive. History of hypotension (last clinic visit BP under 100) and syncope so would avoid BBB or CCB.  Discussed chronic migraine preventative options and patient is  interested in CGRPs -because of convenience and no need to travel to clinic to get the injections. Discussed a trial of Emgality. Side effects  known and unknown reviewed. Patient accepts that risk and  is aware that Emgality is a newly FDA approved medication for chronic migraine headache prevention and clinical experience is limited.   Acute migraine headache treatment:   A trial of sumatriptan as needed for acute severe headache discussed. Serotonin syndrome discussed. Limit use of sumatriptan to no more than 9 days per month.   Mild to moderate headache May try naproxen 500 mg every 12 hours as needed. Limit use to no no more than 14 days per month. Naproxen  Prochlorperazine as  "needed for nausea. Side effects-drowsiness, jittery feeling, increased anxiety, tremor.  Follow up in 2-3 months after starting Emgality or sooner if needed       Subjective   Cindi is a 20 year old who presents for the following health issues headache     HPI   A \"small headaches\" when was growing up but a change in headache pattern in July of 2017.   Headaches since July of 2017 -one day after her spinal tap and headaches wouldn't stop and worse with laying down. Sitting up would help. Associated with sensitivity to light and nausea. Headaches persistent for 4 years. Was started on nortriptyline and initially would help. Headaches have been re-occurring and nortriptyline was increased three times and dose would increase  Mid March 2021 headaches worsen again. Pain is localized to the top and sides and feels sharp and severe enough to make patient nauseous and photophobia. Reports that headache duration of more intense pain about 30 minutes before acute med -Excedrin kicks in. Headache is usually gradually increases and duration for a couple of hours if treated and if untreated -many hours.   Headache treatment -sits quietly until headache passes or small enough to lay down. Headache and nausea gets worse with laying down so waits until Excedrin helps.   No neck pain or stiffness  No autonomic features-lacrimation, nasal congestion  Vision blurry/double chronic since MS diagnosis  Headache frequency 5 out of 7 days =20 +headaches per month. Does not wake up with headaches  School has been stressful -on line [employer] science and interested in [employer] Engineering      Gets roughly about 6 hours of sleep     Mother has history of migraine headaches    No anxiety or depression concerns.     PMH:  MS and on disease-modifying therapy with natalizumab and remains on that treatment. Has been seeing Dr [place]  Syncope and low BP and orthostatic at times  Anemia -iron def and on iron " supplements      Current treatment  Gabapentin 300 mg three times daily   Nortriptyline 100 mg at bedtime       Review of Systems   Constitutional, HEENT, cardiovascular, pulmonary, gi and gu systems are negative, except as otherwise noted.      Objective       Vitals:  No vitals were obtained today due to virtual visit.    Physical Exam   GENERAL: Healthy, alert and no distress  EYES: Eyes grossly normal to inspection.  No discharge or erythema, or obvious scleral/conjunctival abnormalities.  RESP: No audible wheeze, cough, or visible cyanosis.  No visible retractions or increased work of breathing.    SKIN: Visible skin clear. No significant rash, abnormal pigmentation or lesions.  NEURO: Facial expressions intact, no apparent weakness. PSYCH: Mentation appears normal, affect normal/bright, judgement and insight intact, normal speech and appearance well-groomed.    Diagnostic Test Results:  MR CERVICAL SPINE W/O & W CONTRAST 4/8/2021 1:40 PM     Provided History: MS; MS (multiple sclerosis) (H)  ICD-10: MS (multiple sclerosis) (H)     Comparison:     Technique: Sagittal T1-weighted, sagittal T2-weighted, sagittal  diffusion weighted, axial T2-weighted, and axial T2* gradient echo  images of the cervical spine were obtained without intravenous  contrast. Following intravenous administration of gadolinium, axial  and sagittal T1-weighted images with fat saturation were also  obtained.     Contrast: 9cc's Gadavist     Findings:  The cervical vertebrae are normally aligned.  There is no disc height  narrowing at any level.  Beginning at approximately the level of C2-3  extending to the level of the upper endplate of C5 there is patchy and  confluent T2 hyperintensity within the cervical cord that is unchanged  from the prior study..  There is no abnormal contrast enhancement  within the cervical spinal cord, thecal sac or vertebral column.  The  findings on a level by level basis are as follows:     C2-3:  No spinal  canal or neural foraminal narrowing.     C3-4:  No spinal canal or neural foraminal narrowing.     C4-5:  No spinal canal or neural foraminal narrowing.     C5-6:  No spinal canal or neural foraminal narrowing.     C6-7:  No spinal canal or neural foraminal narrowing.     C7-T1:  No spinal canal or neural foraminal narrowing.     No abnormality of the paraspinous soft tissues.                                                                      Impression:   1. No significant change in the patchy and confluent T2 hyperintensity  within the cervical cord from about the level of C2-3 through the  level of the upper endplate of C5.  2. No abnormal enhancement to suggest active demyelination.     GAVIOTA IVAN MD    Brain MR without and with contrast      History: MS; MS (multiple sclerosis) (H).   ICD-10: MS (multiple sclerosis) (H)  Comparison: 10/24/2019      Technique:   Brain MR: Sagittal thin FLAIR and axial T1-weighted and FLAIR images  were obtained without intravenous contrast. Following intravenous  gadolinium-based contrast administration, axial diffusion,  susceptibility, T2-weighted, T1-weighted, and coronal T1-weighted and  FLAIR images were obtained.     Contrast: 9cc's Gadavist     Findings: No mass lesion, midline shift, or abnormal intraaxial or  extraaxial fluid collection. There are, however, 10-15 foci of  T2-hyperintensity within the cerebral white matter that raise the  question of underlying demyelinating disease. Specifically, there are  lesions within the right middle cerebellar peduncle, brainstem, and  periventricular white matter. The T1-weighted images do not show any  areas of severe hypointensity. There is no significant cerebral  atrophy. Following the administration of intravenous contrast, there  are no foci of abnormal contrast enhancement noted. Compared to the  previous study, there has been no significant interval change.     The major intracranial vascular flow-voids do appear  patent.  Incidental cavum septi pellucidi et vergae. There are no significant  abnormalities in the visualized portions of the mastoid air cells or  paranasal sinuses.                                                                      Impression:   1. The study demonstrates 10-15 foci of T2-hyperintensity within the  cerebral white matter consistent with the clinical suspicion of  demyelinating disease. There are no foci of abnormal enhancement noted  intracranially .   2. There is no significant interval change from the prior study.     GAVIOTA IVAN MD  Video-Visit Details    Type of service:  Video Visit    Video End Time:12:31 pm    Originating Location (pt. Location): Home    Distant Location (provider location):  [facility] [place]     Platform used for Video Visit: Radha     I discussed all my recommendations with Cindi Ferrer who verbalizes understanding and comfortable with the plan.  All of patient's questions were answered from the best of my knowledge.  Patient is in agreement with the plan.     52 minutes spent on the date of the encounter doing chart review, history and exam, documentation and further activities as noted above    EDISON Chris, CNP [facility]  Headache certified  [facility]

## 2021-05-26 DIAGNOSIS — G35 MULTIPLE SCLEROSIS (H): ICD-10-CM

## 2021-05-26 RX ORDER — GABAPENTIN 300 MG/1
CAPSULE ORAL
Qty: 90 CAPSULE | Refills: 11 | Status: SHIPPED | OUTPATIENT
Start: 2021-05-26 | End: 2022-04-07

## 2021-05-26 NOTE — TELEPHONE ENCOUNTER
Received refill request for GABAPENTIN from [facility]; Patient was last seen ON 04/08/2021 and has follow up appointment ON 10/7/2021 with Steve. Pended to MS pool for review/approval    Vicky Baugh MA

## 2021-05-27 ENCOUNTER — TELEPHONE (OUTPATIENT)
Dept: NEUROLOGY | Facility: CLINIC | Age: 21
End: 2021-05-27

## 2021-05-27 NOTE — TELEPHONE ENCOUNTER
Prior Authorization Retail Medication Request    Medication/Dose: galcanezumab-gnlm (EMGALITY) 120 MG/ML injection; Inject 240 mg subcutaneous first month and then inject 120 mg subcutaneous every 28 days  ICD code (if different than what is on RX):    G43.009  Previously Tried and Failed:  excedrin  Current treatment  Gabapentin 300 mg three times daily   Nortriptyline 100 mg at bedtime     Patient is already on nortriptyline and gabapentin so would avoid adding other anticonvalsants or antihypertensive. History of hypotension (last clinic visit BP under 100) and syncope so would avoid BBB or CCB.  Rationale:  Chronic migraine 20 headache days a month.    Insurance Name:  Blue plus  Insurance ID:  [id number]       Pharmacy Information (if different than what is on RX)  Name:    Phone:

## 2021-05-27 NOTE — TELEPHONE ENCOUNTER
Central Prior Authorization Team   Phone: [phone]    PA Initiation    Medication: galcanezumab-gnlm (EMGALITY) 120 MG/ML injection; Inject 240 mg subcutaneous first month and then inject 120 mg subcutaneous every 28 days  Insurance Company: [employer] [place] - Phone [phone] Fax [phone]  Pharmacy Filling the Rx: [id number] - [place], [place] - [address] AT [address] [place] [address] & [facility]  Filling Pharmacy Phone: [phone]  Filling Pharmacy Fax: [phone]  Start Date: 5/27/2021

## 2021-07-08 ENCOUNTER — TELEPHONE (OUTPATIENT)
Dept: NEUROLOGY | Facility: CLINIC | Age: 21
End: 2021-07-08

## 2021-07-08 NOTE — TELEPHONE ENCOUNTER
Prior Authorization Retail Medication Request    Medication/Dose: galcanezumab-gnlm (EMGALITY) 120 MG/ML injection; Inject 240 mg subcutaneous first month and then inject 120 mg subcutaneous every 28 days  ICD code (if different than what is on RX):    G43.009  Previously Tried and Failed:  excedrin  Current treatment  Gabapentin 300 mg three times daily   Nortriptyline 100 mg at bedtime      Patient is already on nortriptyline and gabapentin so would avoid adding other anticonvalsants or antihypertensive. History of hypotension (last clinic visit BP under 100) and syncope so would avoid BBB or CCB.  Rationale:  Chronic migraine 20 headache days a month.     Insurance Name:  Blue plus  Insurance ID:  [id number]         Pharmacy Information (if different than what is on RX)  Name: [facility] #[id number] - [place], [place] - [address] AT [address] [place] [address] & [facility] [address]   Phone: [phone]

## 2021-07-08 NOTE — TELEPHONE ENCOUNTER
Prior Authorization Not Needed per Insurance    Medication: Emgality 120MG/ML Auto-Injectors  Insurance Company: [employer] - Phone [phone] Fax [phone]  Pharmacy Filling the Rx: [facility] #[id number] - [place], [place] - [address] AT [address] [place] [address] & [facility]    Called Laura at [phone] as per telephone encounter from 05/27/21 there is already an approval on file that is good until 11/27/2021, making sure they are getting a paid claim or not. Per [facility] it is rejecting for them, called [employer] and they stated they have a paid claim for 2 on 6/1/21 and the pharmacy is billing for 2 still. Called the pharmacy back and had them run the maintenance dose of 1ml.

## 2021-10-07 ENCOUNTER — LAB (OUTPATIENT)
Dept: LAB | Facility: CLINIC | Age: 21
End: 2021-10-07
Payer: COMMERCIAL

## 2021-10-07 ENCOUNTER — OFFICE VISIT (OUTPATIENT)
Dept: NEUROLOGY | Facility: CLINIC | Age: 21
End: 2021-10-07
Attending: PSYCHIATRY & NEUROLOGY
Payer: COMMERCIAL

## 2021-10-07 VITALS
HEART RATE: 69 BPM | DIASTOLIC BLOOD PRESSURE: 74 MMHG | OXYGEN SATURATION: 99 % | WEIGHT: 183.5 LBS | SYSTOLIC BLOOD PRESSURE: 114 MMHG | BODY MASS INDEX: 30.54 KG/M2

## 2021-10-07 DIAGNOSIS — R53.82 CHRONIC FATIGUE: ICD-10-CM

## 2021-10-07 DIAGNOSIS — R25.1 TREMOR: ICD-10-CM

## 2021-10-07 DIAGNOSIS — R51.9 NONINTRACTABLE HEADACHE, UNSPECIFIED CHRONICITY PATTERN, UNSPECIFIED HEADACHE TYPE: ICD-10-CM

## 2021-10-07 DIAGNOSIS — G35 MS (MULTIPLE SCLEROSIS) (H): ICD-10-CM

## 2021-10-07 DIAGNOSIS — G35 MS (MULTIPLE SCLEROSIS) (H): Primary | ICD-10-CM

## 2021-10-07 LAB
ALBUMIN SERPL-MCNC: 4 G/DL (ref 3.4–5)
ALP SERPL-CCNC: 141 U/L (ref 40–150)
ALT SERPL W P-5'-P-CCNC: 16 U/L (ref 0–50)
AST SERPL W P-5'-P-CCNC: 10 U/L (ref 0–45)
BASOPHILS # BLD AUTO: 0 10E3/UL (ref 0–0.2)
BASOPHILS NFR BLD AUTO: 0 %
BILIRUB DIRECT SERPL-MCNC: 0.2 MG/DL (ref 0–0.2)
BILIRUB SERPL-MCNC: 0.5 MG/DL (ref 0.2–1.3)
DEPRECATED CALCIDIOL+CALCIFEROL SERPL-MC: 46 UG/L (ref 20–75)
EOSINOPHIL # BLD AUTO: 0.2 10E3/UL (ref 0–0.7)
EOSINOPHIL NFR BLD AUTO: 2 %
ERYTHROCYTE [DISTWIDTH] IN BLOOD BY AUTOMATED COUNT: 15.3 % (ref 10–15)
HCT VFR BLD AUTO: 38.7 % (ref 35–47)
HGB BLD-MCNC: 12.9 G/DL (ref 11.7–15.7)
IMM GRANULOCYTES # BLD: 0 10E3/UL
IMM GRANULOCYTES NFR BLD: 0 %
LYMPHOCYTES # BLD AUTO: 5 10E3/UL (ref 0.8–5.3)
LYMPHOCYTES NFR BLD AUTO: 57 %
MCH RBC QN AUTO: 28.9 PG (ref 26.5–33)
MCHC RBC AUTO-ENTMCNC: 33.3 G/DL (ref 31.5–36.5)
MCV RBC AUTO: 87 FL (ref 78–100)
MONOCYTES # BLD AUTO: 0.7 10E3/UL (ref 0–1.3)
MONOCYTES NFR BLD AUTO: 8 %
NEUTROPHILS # BLD AUTO: 3 10E3/UL (ref 1.6–8.3)
NEUTROPHILS NFR BLD AUTO: 33 %
NRBC # BLD AUTO: 0 10E3/UL
NRBC BLD AUTO-RTO: 0 /100
PLATELET # BLD AUTO: 324 10E3/UL (ref 150–450)
PROT SERPL-MCNC: 7.8 G/DL (ref 6.8–8.8)
RBC # BLD AUTO: 4.46 10E6/UL (ref 3.8–5.2)
WBC # BLD AUTO: 8.9 10E3/UL (ref 4–11)

## 2021-10-07 PROCEDURE — 85025 COMPLETE CBC W/AUTO DIFF WBC: CPT | Performed by: PATHOLOGY

## 2021-10-07 PROCEDURE — 99214 OFFICE O/P EST MOD 30 MIN: CPT | Performed by: PSYCHIATRY & NEUROLOGY

## 2021-10-07 PROCEDURE — G0463 HOSPITAL OUTPT CLINIC VISIT: HCPCS

## 2021-10-07 PROCEDURE — 82306 VITAMIN D 25 HYDROXY: CPT | Performed by: PSYCHIATRY & NEUROLOGY

## 2021-10-07 PROCEDURE — 36415 COLL VENOUS BLD VENIPUNCTURE: CPT | Performed by: PATHOLOGY

## 2021-10-07 PROCEDURE — 80076 HEPATIC FUNCTION PANEL: CPT | Performed by: PATHOLOGY

## 2021-10-07 ASSESSMENT — PAIN SCALES - GENERAL: PAINLEVEL: NO PAIN (0)

## 2021-10-07 NOTE — PATIENT INSTRUCTIONS
1. Blood tests today    2. Continue Tysabri infusions    3. I think you can try reducing nortriptyline to 50 mg at bedtime for 2-4 weeks; if headache remain well controlled on that dose you can try stopping it after that    4. Return to clinic in 6 months

## 2021-10-09 ENCOUNTER — HEALTH MAINTENANCE LETTER (OUTPATIENT)
Age: 21
End: 2021-10-09

## 2021-10-18 LAB — SCANNED LAB RESULT: ABNORMAL

## 2021-11-09 DIAGNOSIS — G43.009 MIGRAINE WITHOUT AURA AND WITHOUT STATUS MIGRAINOSUS, NOT INTRACTABLE: ICD-10-CM

## 2021-11-11 RX ORDER — NORTRIPTYLINE HYDROCHLORIDE 50 MG/1
CAPSULE ORAL
Qty: 60 CAPSULE | Refills: 5 | Status: SHIPPED | OUTPATIENT
Start: 2021-11-11 | End: 2023-05-11

## 2021-11-11 NOTE — TELEPHONE ENCOUNTER
Received refill request for nortriptyline from [facility]; Patient was last seen in October and has follow up appointment next April with Dr. [place]. Refilled per MS refill protocol.    Daniela Medina RN

## 2021-11-14 ENCOUNTER — MYC MEDICAL ADVICE (OUTPATIENT)
Dept: NEUROLOGY | Facility: CLINIC | Age: 21
End: 2021-11-14
Payer: COMMERCIAL

## 2021-11-15 NOTE — PROGRESS NOTES
"Referral source: Established patient     Chief complaint: Multiple sclerosis     History of the Present Illness: Ms. Cindi Ferrer is a 21 year old right-handed woman who is evaluated in the Multiple Sclerosis Clinic today for a scheduled follow up visit regarding her diagnosis of multiple sclerosis.    The patient's history is as per my previous notes.  Initial onset of symptoms of demyelinating disease was in 2017 when she had horizontal diplopia and left hemibody numbness.  MRI scan at that time demonstrated T2 hyperintense lesions in the brain and spinal cord suggestive of demyelinating disease of the type seen in multiple sclerosis and CSF examination was additionally positive for oligoclonal bands.  The patient was initiated on disease-modifying therapy with natalizumab and remains on that treatment to date.     Today, she denies any new episodic changes in vision, balance, strength or sensation suggestive of new MS relapse since her last visit to this clinic. She remains enrolled as a full time student at [employer]. She relates that school is very stressful, but overall she is doing all right.    We discussed a number of her chronic symptomatic concerns. For headache, she has started taking galcanezumab in addition to nortriptyline 100 mg at bedtime and gabapentin 300-600 mg BID. Adding the new medication has been very helpful in managing her headaches.    For fatigue, she takes modafinil intermittently, either 100-200 mg as a single dose or 100 mg twice daily, depending on need. She takes the medication more regularly when school is in session, and continues to find that it is helpful when she takes it.    Regarding her orthostatic symptoms, she endorses ongoing intermittent lightheadedness, but this is not as severe or frequent as it was in the past. She is now on an iron supplement for previously noted iron deficiency anemia.    She relates that her tremors have \"slowed down\", although they " still occur occasionally. She takes clonazepam sparingly when these are particularly bothersome, mainly at bedtime only.    PHYSICAL EXAMINATION:   VITAL SIGNS: Blood pressure 114/74; pulse 69; oxygen saturation 99%; weight 83.2 kg; height 1.65 m.  GENERAL: Well nourished young adult woman who presents to the examination accompanied by her father, awake and alert and in no acute distress.    NEUROLOGIC EXAMINATION:  CRANIAL NERVES: Visual fields are full to confrontation. Extraocular movements are intact with no internuclear ophthalmoplegia. Facial strength is normal. Palate elevation and tongue protrusion are normal.  POWER: Strength is normal in proximal and distal muscles in the upper and lower limbs bilaterally except for trace weakness of the anterior tibialis muscle on the left.  REFLEXES: Reflexes are symmetric and within normal limits in the upper and lower limbs.  MOTOR/CEREBELLAR: There is no appendicular ataxia on finger to nose testing. Rapid alternating movements are mildly slowed in the left hand and fingers. There is no pronator drift in the arms.  GAIT: The patient is able to ambulate on a flat, level surface with no gross loss of postural stability. She can walk on heels and toes. Tandem gait is mildly to moderately impaired for age.    Assessment/plan:    1. Multiple sclerosis  The patient is clinically stable with no evidence of active inflammatory demyelination on natalizumab infusions, which she will continue.     I will check routine laboratory studies for monitoring of this medication today, to include complete blood counts with differential, hepatic panel, and ADRIANA virus serology. I will also check a vitamin D level and advise the patient on supplementation with vitamin D as needed to maintain her level within the goal range of 60-80 mcg/L.    I would like to see her back in 6 months for a review.    2. Headache  Headache frequency is substantially improved on galcanezumab. As headache is the  only reason why she is taking nortriptyline, I told her that she can try reducing this to 50 mg at bedtime for 2-4 weeks, and then try stopping it altogether if this is tolerated. As nortriptyline can be somewhat sedating, getting her off of this drug may help her fatigue symptoms.     If she notes worsening headaches, she can restart the medication.      3. Chronic fatigue  She will continue modafinil 100 to 200 mg per day as needed.    4. Tremor  She can continue her very occasional use of clonazepam 0.5 to 1 mg for tremor, as needed.

## 2021-12-27 ENCOUNTER — TELEPHONE (OUTPATIENT)
Dept: NEUROLOGY | Facility: CLINIC | Age: 21
End: 2021-12-27
Payer: COMMERCIAL

## 2021-12-27 NOTE — TELEPHONE ENCOUNTER
Renewal Request     Medication/Dose: galcanezumab-gnlm (EMGALITY) 120 MG/ML injection; Inject 240 mg subcutaneous first month and then inject 120 mg subcutaneous every 28 days  ICD code (if different than what is on RX):    G43.009  Previously Tried and Failed:  excedrin  Current treatment  Gabapentin 300 mg three times daily   Nortriptyline 100 mg at bedtime      Patient is already on nortriptyline and gabapentin so would avoid adding other anticonvalsants or antihypertensive. History of hypotension (last clinic visit BP under 100) and syncope so would avoid BBB or CCB.  Rationale:  Chronic migraine 20 headache days a month.     Insurance Name:  Blue plus  Insurance ID:  [id number]         Pharmacy Information (if different than what is on RX)  Name: [id number] - [place], [place] - [address] AT [address] [place] [address] & [facility] [address]   Phone: [phone]

## 2021-12-27 NOTE — TELEPHONE ENCOUNTER
PA Initiation    Medication: galcanezumab-gnlm (EMGALITY) 120 MG/ML injection  Insurance Company: [employer] - Phone [phone] Fax [phone]  Pharmacy Filling the Rx: [facility] #[id number] - [place], [place] - [address] AT [address] [place] [address] & [facility]  Filling Pharmacy Phone: [phone]  Filling Pharmacy Fax: [phone]  Start Date: 12/27/2021

## 2021-12-28 NOTE — TELEPHONE ENCOUNTER
Prior Authorization Approval    Authorization Effective Date: 11/28/2021  Authorization Expiration Date: 12/27/2022  Medication: galcanezumab-gnlm (EMGALITY) 120 MG/ML injection--APPROVED  Approved Dose/Quantity:   Reference #:     Insurance Company: [employer] - Phone [phone] Fax [phone]  Expected CoPay:       CoPay Card Available:      Foundation Assistance Needed:    Which Pharmacy is filling the prescription (Not needed for infusion/clinic administered): [facility] #[id number] - [place], [place] - [address] AT [address] [place] [address] & [facility]  Pharmacy Notified: Yes  Patient Notified: Yes **Instructed pharmacy to notify patient when script is ready to /ship.**

## 2022-01-06 ENCOUNTER — TRANSFERRED RECORDS (OUTPATIENT)
Dept: HEALTH INFORMATION MANAGEMENT | Facility: CLINIC | Age: 22
End: 2022-01-06
Payer: COMMERCIAL

## 2022-01-07 ENCOUNTER — TELEPHONE (OUTPATIENT)
Dept: NEUROLOGY | Facility: CLINIC | Age: 22
End: 2022-01-07
Payer: COMMERCIAL

## 2022-01-07 NOTE — TELEPHONE ENCOUNTER
Received Plan of Treatment from [facility] for Dr [place] to sign. POT signed by Dr [place] and faxed back to Damaris at [phone].    Jerrica Garner RN

## 2022-03-07 DIAGNOSIS — R25.1 TREMOR: ICD-10-CM

## 2022-03-07 DIAGNOSIS — G35 MULTIPLE SCLEROSIS (H): ICD-10-CM

## 2022-03-07 DIAGNOSIS — R53.82 CHRONIC FATIGUE: ICD-10-CM

## 2022-03-08 RX ORDER — MODAFINIL 200 MG/1
TABLET ORAL
Qty: 30 TABLET | Refills: 5 | Status: SHIPPED | OUTPATIENT
Start: 2022-03-08 | End: 2023-01-17

## 2022-03-08 RX ORDER — CLONAZEPAM 0.5 MG/1
TABLET ORAL
Qty: 60 TABLET | Refills: 5 | Status: SHIPPED | OUTPATIENT
Start: 2022-03-08 | End: 2023-01-16

## 2022-03-08 NOTE — TELEPHONE ENCOUNTER
Patient requesting refill of their modafinil; Patient was last seen in October and has follow up appointment in April with Dr [place]. Pended rx to Dr [place] for signature and will send electronically to the pharmacy once signed.    Jerrica Garner RN

## 2022-03-08 NOTE — TELEPHONE ENCOUNTER
Patient requesting refill of their clonazepam; Patient was last seen in October and has follow up appointment in April with Dr [place]. Pended rx to Dr [place] for signature and will send electronically to the pharmacy once signed.    Jerrica Garner RN

## 2022-04-07 ENCOUNTER — OFFICE VISIT (OUTPATIENT)
Dept: NEUROLOGY | Facility: CLINIC | Age: 22
End: 2022-04-07
Attending: PSYCHIATRY & NEUROLOGY
Payer: COMMERCIAL

## 2022-04-07 ENCOUNTER — LAB (OUTPATIENT)
Dept: LAB | Facility: CLINIC | Age: 22
End: 2022-04-07
Payer: COMMERCIAL

## 2022-04-07 VITALS
DIASTOLIC BLOOD PRESSURE: 72 MMHG | BODY MASS INDEX: 29.97 KG/M2 | OXYGEN SATURATION: 99 % | HEART RATE: 88 BPM | WEIGHT: 180.1 LBS | SYSTOLIC BLOOD PRESSURE: 107 MMHG

## 2022-04-07 DIAGNOSIS — G35 MS (MULTIPLE SCLEROSIS) (H): ICD-10-CM

## 2022-04-07 DIAGNOSIS — M79.2 NEUROPATHIC PAIN: ICD-10-CM

## 2022-04-07 DIAGNOSIS — R25.1 TREMOR: ICD-10-CM

## 2022-04-07 DIAGNOSIS — G35 MS (MULTIPLE SCLEROSIS) (H): Primary | ICD-10-CM

## 2022-04-07 DIAGNOSIS — R53.82 CHRONIC FATIGUE: ICD-10-CM

## 2022-04-07 LAB
ALBUMIN SERPL-MCNC: 3.4 G/DL (ref 3.4–5)
ALP SERPL-CCNC: 117 U/L (ref 40–150)
ALT SERPL W P-5'-P-CCNC: 16 U/L (ref 0–50)
AST SERPL W P-5'-P-CCNC: 14 U/L (ref 0–45)
BASOPHILS # BLD AUTO: 0 10E3/UL (ref 0–0.2)
BASOPHILS NFR BLD AUTO: 0 %
BILIRUB DIRECT SERPL-MCNC: 0.1 MG/DL (ref 0–0.2)
BILIRUB SERPL-MCNC: 0.4 MG/DL (ref 0.2–1.3)
EOSINOPHIL # BLD AUTO: 0.1 10E3/UL (ref 0–0.7)
EOSINOPHIL NFR BLD AUTO: 1 %
ERYTHROCYTE [DISTWIDTH] IN BLOOD BY AUTOMATED COUNT: 13.5 % (ref 10–15)
HCT VFR BLD AUTO: 39.7 % (ref 35–47)
HGB BLD-MCNC: 13.2 G/DL (ref 11.7–15.7)
IMM GRANULOCYTES # BLD: 0 10E3/UL
IMM GRANULOCYTES NFR BLD: 0 %
LYMPHOCYTES # BLD AUTO: 5.4 10E3/UL (ref 0.8–5.3)
LYMPHOCYTES NFR BLD AUTO: 64 %
MCH RBC QN AUTO: 29.3 PG (ref 26.5–33)
MCHC RBC AUTO-ENTMCNC: 33.2 G/DL (ref 31.5–36.5)
MCV RBC AUTO: 88 FL (ref 78–100)
MONOCYTES # BLD AUTO: 0.5 10E3/UL (ref 0–1.3)
MONOCYTES NFR BLD AUTO: 6 %
NEUTROPHILS # BLD AUTO: 2.5 10E3/UL (ref 1.6–8.3)
NEUTROPHILS NFR BLD AUTO: 29 %
NRBC # BLD AUTO: 0 10E3/UL
NRBC BLD AUTO-RTO: 0 /100
PLAT MORPH BLD: NORMAL
PLATELET # BLD AUTO: 255 10E3/UL (ref 150–450)
PROT SERPL-MCNC: 7 G/DL (ref 6.8–8.8)
RBC # BLD AUTO: 4.51 10E6/UL (ref 3.8–5.2)
RBC MORPH BLD: NORMAL
WBC # BLD AUTO: 8.4 10E3/UL (ref 4–11)

## 2022-04-07 PROCEDURE — 36415 COLL VENOUS BLD VENIPUNCTURE: CPT | Performed by: PATHOLOGY

## 2022-04-07 PROCEDURE — 82306 VITAMIN D 25 HYDROXY: CPT | Performed by: PSYCHIATRY & NEUROLOGY

## 2022-04-07 PROCEDURE — 80076 HEPATIC FUNCTION PANEL: CPT | Performed by: PATHOLOGY

## 2022-04-07 PROCEDURE — 99214 OFFICE O/P EST MOD 30 MIN: CPT | Performed by: PSYCHIATRY & NEUROLOGY

## 2022-04-07 PROCEDURE — 85025 COMPLETE CBC W/AUTO DIFF WBC: CPT | Performed by: PATHOLOGY

## 2022-04-07 PROCEDURE — G0463 HOSPITAL OUTPT CLINIC VISIT: HCPCS

## 2022-04-07 RX ORDER — GABAPENTIN 300 MG/1
CAPSULE ORAL
Qty: 180 CAPSULE | Refills: 11 | Status: SHIPPED | OUTPATIENT
Start: 2022-04-07 | End: 2023-04-25

## 2022-04-07 ASSESSMENT — PAIN SCALES - GENERAL: PAINLEVEL: MODERATE PAIN (5)

## 2022-04-07 NOTE — LETTER
"4/7/2022      RE: Cindi Ferrer  [address]  [place] [address]     Referral source: Established patient    Chief complaint: Multiple sclerosis    History of the Present Illness: Ms. Cindi Ferrer is a 21 year-old right-handed woman who is evaluated in the Multiple Sclerosis Clinic today for a scheduled follow-up visit regarding her diagnosis of multiple sclerosis.    The patient's history is as per my previous notes.  She initially developed symptoms of demyelinating disease in 2017 when she had horizontal diplopia and left hemibody numbness.  MRI imaging at that time demonstrated T2 hyperintense lesions suggestive of demyelinating disease of the type seen in multiple sclerosis, and CSF examination was also positive for oligoclonal bands.  The patient was started on disease-modifying therapy with natalizumab, and has remained on that treatment to date.    Today, the patient describes her overall status as \"mostly good.\" She tells me that the week prior to her most recent natalizumab infusion, she had \"tremors\" on a number of occasions that woke her from sleep.  This issue seemed to resolve after the infusion.  Otherwise, she denies any new episodic changes in vision, balance, strength or sensation suggestive of new relapse of multiple sclerosis since she was last seen.    We discussed a number symptomatic issues.  Presently, she is most bothered by burning-type discomfort involving the face as well as the back of the arms.  Sensation in her hands seems reduced.  Currently, she is taking gabapentin 300 mg 3 times daily for nerve pain.  She was previously on nortriptyline as well for headache prevention, but is no longer taking this medication as she is now on galcanezumab    For fatigue, she takes modafinil 100 mg once in the morning and once again around noon.  Her response to this seems somewhat off and on, but overall she relates that this is \"kind of helps.\"     For tremor, she continues to take clonazepam " on a very intermittent basis.    PHYSICAL EXAMINATION:    VITAL SIGNS:  Blood pressure 107/72; pulse 88; oxygen saturation 99%; weight 81.7 kg.  GENERAL:  Well-nourished woman who presents to the examination accompanied by her father and sister, awake and alert and in no acute distress.    NEUROLOGIC EXAMINATION:    CRANIAL NERVES:  Visual fields are full to confrontation.  Extraocular movements are intact with no internuclear ophthalmoplegia.  Facial strength is normal.  Palate elevation and tongue protrusion are normal.  POWER:  Strength is within normal limits in proximal and distal muscles in the upper and lower limbs throughout.  REFLEXES:  Brisk but roughly symmetric at the knees, and otherwise within normal limits and symmetric throughout.  MOTOR/CEREBELLAR:  There are no tremors, myoclonus or other abnormal movements seen today.  There is no appendicular ataxia on finger-to-nose testing.  Rapid alternating movements are within normal limits in the hands and fingers.  There is no pronator drift in the arms.  GAIT:  The patient is able to ambulate independently on a flat, level surface with no gross loss of postural stability.  She can walk on heels and toes.  Tandem gait appears mildly unstable.    Assessment/plan:     1.  Multiple sclerosis  The patient remains clinically stable with no evidence of active inflammatory demyelination on current treatment with natalizumab, which she will continue.     Today, I will check routine laboratory studies for monitoring of this medication to include complete blood count swith differential, hepatic panel, and ADRIANA virus serology.  I will also check a vitamin D level and advise the patient on supplementation with vitamin D as needed to maintain her level within the goal range of 60-80 mcg/L.    2.  Neuropathic pain  I gave her instructions on titrating her dose of gabapentin from 300 mg 3 times daily up to 600 mg 3 times daily.  We will see if this is helpful for her  burning neuropathic discomfort.    3.  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  Modafinil does appear to be helping with maintenance of energy levels to some extent, and we will continue this.    4.  Tremor  She does not have much visible tremor today.  She occasionally takes clonazepam for this problem as needed.    In addition to counseling the patient, I spoke with the patient's father via the [employer] telephone  to clarify the plan as outlined above.  I answered all questions to their satisfaction.    I would like to see the patient back in 6 months for a review.    Coy Wilson MD   of Neurology  [facility]    Cc:  Danae Pinon MD (PCP)  Patient

## 2022-04-07 NOTE — PATIENT INSTRUCTIONS
1. We will try increasing gabapentin 300 mg per the following schedule:    Days 1-3: Take 1 capsule morning and afternoon and two in the evening    Days 4-6 Take 2 capsules in the morning, 1 in the afternoon, and two in the evening    Then take 2 capsules twice daily    2. Continue modafinil and clonazepam as you are doing    3. Blood tests today    4. Return to clinic in 6 months

## 2022-04-07 NOTE — PROGRESS NOTES
"Date of service: April 7, 2022    Referral source: Established patient    Chief complaint: Multiple sclerosis    History of the Present Illness: Ms. Cindi Ferrer is a 21 year-old right-handed woman who is evaluated in the Multiple Sclerosis Clinic today for a scheduled follow-up visit regarding her diagnosis of multiple sclerosis.    The patient's history is as per my previous notes.  She initially developed symptoms of demyelinating disease in 2017 when she had horizontal diplopia and left hemibody numbness.  MRI imaging at that time demonstrated T2 hyperintense lesions suggestive of demyelinating disease of the type seen in multiple sclerosis, and CSF examination was also positive for oligoclonal bands.  The patient was started on disease-modifying therapy with natalizumab, and has remained on that treatment to date.    Today, the patient describes her overall status as \"mostly good.\" She tells me that the week prior to her most recent natalizumab infusion, she had \"tremors\" on a number of occasions that woke her from sleep.  This issue seemed to resolve after the infusion.  Otherwise, she denies any new episodic changes in vision, balance, strength or sensation suggestive of new relapse of multiple sclerosis since she was last seen.    We discussed a number symptomatic issues.  Presently, she is most bothered by burning-type discomfort involving the face as well as the back of the arms.  Sensation in her hands seems reduced.  Currently, she is taking gabapentin 300 mg 3 times daily for nerve pain.  She was previously on nortriptyline as well for headache prevention, but is no longer taking this medication as she is now on galcanezumab    For fatigue, she takes modafinil 100 mg once in the morning and once again around noon.  Her response to this seems somewhat off and on, but overall she relates that this is \"kind of helps.\"     For tremor, she continues to take clonazepam on a very intermittent " basis.    PHYSICAL EXAMINATION:    VITAL SIGNS:  Blood pressure 107/72; pulse 88; oxygen saturation 99%; weight 81.7 kg.  GENERAL:  Well-nourished woman who presents to the examination accompanied by her father and sister, awake and alert and in no acute distress.    NEUROLOGIC EXAMINATION:    CRANIAL NERVES:  Visual fields are full to confrontation.  Extraocular movements are intact with no internuclear ophthalmoplegia.  Facial strength is normal.  Palate elevation and tongue protrusion are normal.  POWER:  Strength is within normal limits in proximal and distal muscles in the upper and lower limbs throughout.  REFLEXES:  Brisk but roughly symmetric at the knees, and otherwise within normal limits and symmetric throughout.  MOTOR/CEREBELLAR:  There are no tremors, myoclonus or other abnormal movements seen today.  There is no appendicular ataxia on finger-to-nose testing.  Rapid alternating movements are within normal limits in the hands and fingers.  There is no pronator drift in the arms.  GAIT:  The patient is able to ambulate independently on a flat, level surface with no gross loss of postural stability.  She can walk on heels and toes.  Tandem gait appears mildly unstable.    Assessment/plan:     1.  Multiple sclerosis  The patient remains clinically stable with no evidence of active inflammatory demyelination on current treatment with natalizumab, which she will continue.     Today, I will check routine laboratory studies for monitoring of this medication to include complete blood count swith differential, hepatic panel, and ADRIANA virus serology.  I will also check a vitamin D level and advise the patient on supplementation with vitamin D as needed to maintain her level within the goal range of 60-80 mcg/L.    2.  Neuropathic pain  I gave her instructions on titrating her dose of gabapentin from 300 mg 3 times daily up to 600 mg 3 times daily.  We will see if this is helpful for her burning neuropathic  discomfort.    3.  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.  Modafinil does appear to be helping with maintenance of energy levels to some extent, and we will continue this.    4.  Tremor  She does not have much visible tremor today.  She occasionally takes clonazepam for this problem as needed.    In addition to counseling the patient, I spoke with the patient's father via the [employer] telephone  to clarify the plan as outlined above.  I answered all questions to their satisfaction.    I would like to see the patient back in 6 months for a review.    Coy Wilson MD        D: 2022   T: 2022   MTPaco oglesby    Name:     ROEL WHITERONNIE SUTHERLAND  MRN:      [id number]        Account:      [id number]   :      2000           Service Date: 2022       Document: [id number]

## 2022-04-08 LAB — DEPRECATED CALCIDIOL+CALCIFEROL SERPL-MC: 46 UG/L (ref 20–75)

## 2022-04-19 LAB — SCANNED LAB RESULT: ABNORMAL

## 2022-04-21 ENCOUNTER — MEDICAL CORRESPONDENCE (OUTPATIENT)
Dept: HEALTH INFORMATION MANAGEMENT | Facility: CLINIC | Age: 22
End: 2022-04-21
Payer: COMMERCIAL

## 2022-04-25 ENCOUNTER — TELEPHONE (OUTPATIENT)
Dept: NEUROLOGY | Facility: CLINIC | Age: 22
End: 2022-04-25
Payer: COMMERCIAL

## 2022-04-25 NOTE — TELEPHONE ENCOUNTER
Received Tysabri annual renewal order from [facility] for Dr Wilson to sign. Also received request to decrease post infusion observation period from 1h to 30 min. Both of these forms have been signed by Dr Wilson and were faxed to [facility] on 4/22, at fax number [phone].    Jerrica Garner RN

## 2022-05-21 ENCOUNTER — HEALTH MAINTENANCE LETTER (OUTPATIENT)
Age: 22
End: 2022-05-21

## 2022-05-22 ENCOUNTER — MYC MEDICAL ADVICE (OUTPATIENT)
Dept: NEUROLOGY | Facility: CLINIC | Age: 22
End: 2022-05-22
Payer: COMMERCIAL

## 2022-06-03 ENCOUNTER — TRANSFERRED RECORDS (OUTPATIENT)
Dept: HEALTH INFORMATION MANAGEMENT | Facility: CLINIC | Age: 22
End: 2022-06-03
Payer: COMMERCIAL

## 2022-07-06 DIAGNOSIS — G43.709 CHRONIC MIGRAINE WITHOUT AURA WITHOUT STATUS MIGRAINOSUS, NOT INTRACTABLE: Primary | ICD-10-CM

## 2022-07-06 DIAGNOSIS — G43.009 MIGRAINE WITHOUT AURA AND WITHOUT STATUS MIGRAINOSUS, NOT INTRACTABLE: ICD-10-CM

## 2022-07-06 RX ORDER — PROCHLORPERAZINE MALEATE 5 MG
5-10 TABLET ORAL EVERY 6 HOURS PRN
Qty: 20 TABLET | Refills: 3 | Status: SHIPPED | OUTPATIENT
Start: 2022-07-06 | End: 2023-05-11

## 2022-09-15 ENCOUNTER — MEDICAL CORRESPONDENCE (OUTPATIENT)
Dept: NEUROLOGY | Facility: CLINIC | Age: 22
End: 2022-09-15

## 2022-09-17 ENCOUNTER — HEALTH MAINTENANCE LETTER (OUTPATIENT)
Age: 22
End: 2022-09-17

## 2022-10-06 ENCOUNTER — MEDICAL CORRESPONDENCE (OUTPATIENT)
Dept: HEALTH INFORMATION MANAGEMENT | Facility: CLINIC | Age: 22
End: 2022-10-06

## 2022-10-13 ENCOUNTER — LAB (OUTPATIENT)
Dept: LAB | Facility: CLINIC | Age: 22
End: 2022-10-13
Payer: COMMERCIAL

## 2022-10-13 ENCOUNTER — OFFICE VISIT (OUTPATIENT)
Dept: NEUROLOGY | Facility: CLINIC | Age: 22
End: 2022-10-13
Attending: PSYCHIATRY & NEUROLOGY
Payer: COMMERCIAL

## 2022-10-13 VITALS
DIASTOLIC BLOOD PRESSURE: 59 MMHG | WEIGHT: 178.1 LBS | SYSTOLIC BLOOD PRESSURE: 108 MMHG | HEART RATE: 66 BPM | HEIGHT: 66 IN | OXYGEN SATURATION: 100 % | BODY MASS INDEX: 28.62 KG/M2

## 2022-10-13 DIAGNOSIS — R55 NEUROCARDIOGENIC SYNCOPE: ICD-10-CM

## 2022-10-13 DIAGNOSIS — R53.82 CHRONIC FATIGUE: ICD-10-CM

## 2022-10-13 DIAGNOSIS — M79.2 NEUROPATHIC PAIN: ICD-10-CM

## 2022-10-13 DIAGNOSIS — G35 MS (MULTIPLE SCLEROSIS) (H): Primary | ICD-10-CM

## 2022-10-13 DIAGNOSIS — G35 MS (MULTIPLE SCLEROSIS) (H): ICD-10-CM

## 2022-10-13 DIAGNOSIS — R25.1 TREMOR: ICD-10-CM

## 2022-10-13 LAB
ALBUMIN SERPL BCG-MCNC: 4.5 G/DL (ref 3.5–5.2)
ALP SERPL-CCNC: 126 U/L (ref 35–104)
ALT SERPL W P-5'-P-CCNC: 6 U/L (ref 10–35)
AST SERPL W P-5'-P-CCNC: 14 U/L (ref 10–35)
BASOPHILS # BLD AUTO: 0 10E3/UL (ref 0–0.2)
BASOPHILS NFR BLD AUTO: 0 %
BILIRUB DIRECT SERPL-MCNC: <0.2 MG/DL (ref 0–0.3)
BILIRUB SERPL-MCNC: 0.4 MG/DL
EOSINOPHIL # BLD AUTO: 0.2 10E3/UL (ref 0–0.7)
EOSINOPHIL NFR BLD AUTO: 2 %
ERYTHROCYTE [DISTWIDTH] IN BLOOD BY AUTOMATED COUNT: 13.9 % (ref 10–15)
HCT VFR BLD AUTO: 41.5 % (ref 35–47)
HGB BLD-MCNC: 13.9 G/DL (ref 11.7–15.7)
IMM GRANULOCYTES # BLD: 0 10E3/UL
IMM GRANULOCYTES NFR BLD: 0 %
LYMPHOCYTES # BLD AUTO: 4.9 10E3/UL (ref 0.8–5.3)
LYMPHOCYTES NFR BLD AUTO: 59 %
MCH RBC QN AUTO: 29.1 PG (ref 26.5–33)
MCHC RBC AUTO-ENTMCNC: 33.5 G/DL (ref 31.5–36.5)
MCV RBC AUTO: 87 FL (ref 78–100)
MONOCYTES # BLD AUTO: 0.6 10E3/UL (ref 0–1.3)
MONOCYTES NFR BLD AUTO: 7 %
NEUTROPHILS # BLD AUTO: 2.7 10E3/UL (ref 1.6–8.3)
NEUTROPHILS NFR BLD AUTO: 32 %
NRBC # BLD AUTO: 0 10E3/UL
NRBC BLD AUTO-RTO: 1 /100
PLATELET # BLD AUTO: 290 10E3/UL (ref 150–450)
PROT SERPL-MCNC: 7.2 G/DL (ref 6.4–8.3)
RBC # BLD AUTO: 4.78 10E6/UL (ref 3.8–5.2)
WBC # BLD AUTO: 8.4 10E3/UL (ref 4–11)

## 2022-10-13 PROCEDURE — 85025 COMPLETE CBC W/AUTO DIFF WBC: CPT | Performed by: PATHOLOGY

## 2022-10-13 PROCEDURE — 82306 VITAMIN D 25 HYDROXY: CPT | Performed by: PSYCHIATRY & NEUROLOGY

## 2022-10-13 PROCEDURE — 80076 HEPATIC FUNCTION PANEL: CPT | Performed by: PATHOLOGY

## 2022-10-13 PROCEDURE — 36415 COLL VENOUS BLD VENIPUNCTURE: CPT | Performed by: PATHOLOGY

## 2022-10-13 PROCEDURE — 99214 OFFICE O/P EST MOD 30 MIN: CPT | Mod: GC | Performed by: PSYCHIATRY & NEUROLOGY

## 2022-10-13 RX ORDER — LEVONORGESTREL/ETHIN.ESTRADIOL 0.1-0.02MG
1 TABLET ORAL EVERY MORNING
COMMUNITY
Start: 2022-06-13 | End: 2023-05-11

## 2022-10-13 ASSESSMENT — PAIN SCALES - GENERAL: PAINLEVEL: NO PAIN (0)

## 2022-10-13 NOTE — PROGRESS NOTES
Multiple Sclerosis Clinic Visit  10/13/2022    Reason: Multiple Sclerosis     Source of information: Patient and chart review    History of Present Symptom:  Cindi Ferrer is a 22 year old female with a PMH significant for multiple sclerosis who presents today for follow up.      She reports she has passed out about 5 times this past summer. This often happens when overly warm or fatigued. Often while sitting, or when trying to stand up. She will lose consciousness and only lasts a few minutes at most. No confusion afterwards. No memory of the event.     This started a few months after increasing gabapentin. Gabapentin was helpful for tingling and paresthesias.    She reports no difficulty getting her natalizumab and she is tolerating this well. She reports no new symptoms of weakness, numbness, double vision, or blurred vision.      Disease onset: 2017 Horizontal diplopia and left hemibody numbness. Diagnosed at that time based on MRI and + OCB in the CSF.   :   2017 started on natalizumab     Symptom management:  Fatigue: Taking modafinil 100 mg in the morning and around noon. Only taking as needed, 1/2 pill 1-2x/week. This worsens with cold.   Mood:  Bowel/bladder changes:  Gait/balance:   Neuropathic pain in arms and neck, relieved by gabapentin 600 mg TID.      Taking clonazepam about one time per week for tremor. Tremor described as one arm or leg bouncing sometimes associated with discomfort.     The patient's medical, surgical, social, and family history were personally reviewed with the patient.  Past Medical History:   Diagnosis Date    Multiple sclerosis (H) 09/2017      No past surgical history on file.  Social History     Tobacco Use    Smoking status: Never    Smokeless tobacco: Never   Substance Use Topics    Alcohol use: No    Drug use: No     No family history on file.  Current Outpatient Medications   Medication    cholecalciferol (VITAMIN D3) 5000 units (125 mcg) capsule    clonazePAM (KLONOPIN)  "0.5 MG tablet    gabapentin (NEURONTIN) 300 MG capsule    galcanezumab-gnlm (EMGALITY) 120 MG/ML injection    levonorgestrel-ethinyl estradiol (AVIANE) 0.1-20 MG-MCG tablet    levonorgestrel-ethinyl estradiol (AVIANE) 0.1-20 MG-MCG tablet    modafinil (PROVIGIL) 200 MG tablet    naproxen (NAPROSYN) 500 MG tablet    natalizumab (TYSABRI) 300 MG/15ML injection    nortriptyline (PAMELOR) 50 MG capsule    order for DME    prochlorperazine (COMPAZINE) 5 MG tablet    SUMAtriptan (IMITREX) 50 MG tablet    vitamin D3 (CHOLECALCIFEROL) 50 mcg (2000 units) tablet     No current facility-administered medications for this visit.     No Known Allergies      Review of Systems:  14-point review of systems was completed. The pertinent positives and negatives are in the HPI.    Physical Examination   Vitals: /59 (BP Location: Left arm, Patient Position: Chair, Cuff Size: Adult Regular)   Pulse 66   Ht 1.676 m (5' 6\")   Wt 80.8 kg (178 lb 1.6 oz)   SpO2 100%   BMI 28.75 kg/m       Orthostatics 103/65 seated, 119/76 supine, 106/66 standing   General: Patient appears comfortable in no acute distress.   HEENT: NC/AT, no icterus, moist mucous membranes  Chest: non-labored on RA  Extremities: Warm, no edema  Skin: No rash or lesion   Psych: Affect appropriate for situation   Neuro:  Mental status: Awake, alert, attentive. Language is fluent with intact comprehension of commands.  Cranial nerves: PERRL with no relative afferent pupillary defect, conjugate gaze, EOMI, visual fields intact, face symmetric, shoulder shrug strong, tongue protrusion/uvula midline, no dysarthria.   Motor: Normal muscle bulk and tone. No abnormal movements. 5/5 strength in 4/4 extremities.     R L  Deltoid  5 5  Biceps  5 5  Triceps  5 5  Wrist ext 5 5  Finger ext 5 5  Finger abd 5 5    Hip flexion 5 5  Knee flexion 5 5  Knee ext 5 5  Dorsiflexion 5 5    Reflexes: brisk reflexes symmetric in biceps, brachioradialis, 3+ bl patellae, and achilles. No " clonus, toes down-going.  Sensory: Intact to light touch and vibration. Romberg is negative.   Coordination: FNF without ataxia or dysmetria.    Gait: Normal width, stride length, turn, with symmetric arm swing. Tandem walk intact. No difficulty balancing on one leg.     Laboratory:  Vit D 46   JCV 0.25   Imaging:    MRI brain and C-spine stable in 4/2021 as compared to 2019     Assessment/Plan:  Cindi Ferrer is a 22 year old female who presents for follow up for relapsing remitting multiple sclerosis who remains stable on natalizumab both clinically and radiologically.     # Syncope   We discussed her syncopal events. These are not likely related to gabapentin. These can sometimes be associated with MS but more time than not are just something that can happen to young people. We discussed importance of drinking fluids and to try to start the day with a cold glass of water to help keep the blood pressure up. Stay seated or laying if one of these events starts to come on. Increase overall fluid intake and use salt liberally in the diet. We checked orthostatic vitals and she did have a drop from 119/76 HR 59 to 106/66 HR 79 from supine to standing which is mildly positive for orthostasis.     She is taking vit D 5000 international unit(s) daily     # RRMS remains stable on natalizumab  -JCV, LFT, CBC w/diff, vitamin D   -MRI 6 months   -follow up 6 months       Patient seen and discussed with Dr. Wilson.  I have reviewed the plan with the patient, who is in agreement.      Daniela Posada DO  Multiple Sclerosis Fellow     Attending physician: I saw and evaluated the patient with Dr. Posada and I agree with her findings and plan of care as documented above.    The patient and her family describe several episodes of brief loss of consciousness without post-ictal alteration of consciousness, most recently occurring in July. These sound most consistent with neurocardiogenic syncope, and we discussed conservative  management with hydration and salt supplementation.     We will obtain routine laboratory studies for monitoring of natalizumab therapy today, to include complete blood counts with differential, hepatic panel, and ADRIANA virus serology. I will also check a vitamin D level today and advise her on supplementation with vitamin D as needed to maintain her level within the goal range of 60-80 mcg/L.    Remainder as above.    Coy Wilson MD   of Neurology  [facility]    Cc:  Danae Pinon MD (PCP)  Shiela Pinon CNP (Neurology-Headache)  Patient

## 2022-10-13 NOTE — PATIENT INSTRUCTIONS
Regarding episodes of loss of consciousness this summer, I would advise drinking two glasses of very cold water first thing in the morning and again with any symptoms of lightheadedness. Also salt food liberally.    2.   Blood tests today    3.   Continue Tysabri infusions    4.   Continue modafinil, gabapentin, and clonazepam as you are doing    5.   Return to clinic in 6 months with MRI scans of the brain and cervical spine prior to visit

## 2022-10-13 NOTE — Clinical Note
10/13/2022       RE: Cindi Ferrer  [address]  [place] [address]     Dear Colleague,    Thank you for referring your patient, Cindi Ferrer, to the [facility] MULTIPLE SCLEROSIS CLINIC [place] at [facility]. Please see a copy of my visit note below.    Multiple Sclerosis Clinic Visit  10/13/2022    Reason: Multiple Sclerosis     Source of information: Patient and chart review    History of Present Symptom:  Cindi Ferrer is a 22 year old female with a PMH significant for multiple sclerosis who presents today for follow up.      She reports she has passed out about 5 times this past summer. This often happens when overly warm or fatigued. Often while sitting, or when trying to stand up. She will lose consciousness and only lasts a few minutes at most. No confusion afterwards. No memory of the event.     This started a few months after increasing gabapentin. Gabapentin was helpful for tingling and paresthesias.    She reports no difficulty getting her natalizumab and she is tolerating this well. She reports no new symptoms of weakness, numbness, double vision, or blurred vision.      Disease onset: 2017 Horizontal diplopia and left hemibody numbness. Diagnosed at that time based on MRI and + OCB in the CSF.   :   2017 started on natalizumab     Symptom management:  Fatigue: Taking modafinil 100 mg in the morning and around noon. Only taking as needed, 1/2 pill 1-2x/week. This worsens with cold.   Mood:  Bowel/bladder changes:  Gait/balance:   Neuropathic pain in arms and neck, relieved by gabapentin 600 mg TID.      Taking clonazepam about one time per week for tremor. Tremor described as one arm or leg bouncing sometimes associated with discomfort.     The patient's medical, surgical, social, and family history were personally reviewed with the patient.  Past Medical History:   Diagnosis Date    Multiple sclerosis (H) 09/2017      No past surgical  "history on file.  Social History     Tobacco Use    Smoking status: Never    Smokeless tobacco: Never   Substance Use Topics    Alcohol use: No    Drug use: No     No family history on file.  Current Outpatient Medications   Medication    cholecalciferol (VITAMIN D3) 5000 units (125 mcg) capsule    clonazePAM (KLONOPIN) 0.5 MG tablet    gabapentin (NEURONTIN) 300 MG capsule    galcanezumab-gnlm (EMGALITY) 120 MG/ML injection    levonorgestrel-ethinyl estradiol (AVIANE) 0.1-20 MG-MCG tablet    levonorgestrel-ethinyl estradiol (AVIANE) 0.1-20 MG-MCG tablet    modafinil (PROVIGIL) 200 MG tablet    naproxen (NAPROSYN) 500 MG tablet    natalizumab (TYSABRI) 300 MG/15ML injection    nortriptyline (PAMELOR) 50 MG capsule    order for DME    prochlorperazine (COMPAZINE) 5 MG tablet    SUMAtriptan (IMITREX) 50 MG tablet    vitamin D3 (CHOLECALCIFEROL) 50 mcg (2000 units) tablet     No current facility-administered medications for this visit.     No Known Allergies      Review of Systems:  14-point review of systems was completed. The pertinent positives and negatives are in the HPI.    Physical Examination   Vitals: /59 (BP Location: Left arm, Patient Position: Chair, Cuff Size: Adult Regular)   Pulse 66   Ht 1.676 m (5' 6\")   Wt 80.8 kg (178 lb 1.6 oz)   SpO2 100%   BMI 28.75 kg/m       Orthostatics 103/65 seated, 119/76 supine, 106/66 standing   General: Patient appears comfortable in no acute distress.   HEENT: NC/AT, no icterus, moist mucous membranes  Chest: non-labored on RA  Extremities: Warm, no edema  Skin: No rash or lesion   Psych: Affect appropriate for situation   Neuro:  Mental status: Awake, alert, attentive. Language is fluent with intact comprehension of commands.  Cranial nerves: PERRL with no relative afferent pupillary defect, conjugate gaze, EOMI, visual fields intact, face symmetric, shoulder shrug strong, tongue protrusion/uvula midline, no dysarthria.   Motor: Normal muscle bulk and tone. " No abnormal movements. 5/5 strength in 4/4 extremities.     R L  Deltoid  5 5  Biceps  5 5  Triceps  5 5  Wrist ext 5 5  Finger ext 5 5  Finger abd 5 5    Hip flexion 5 5  Knee flexion 5 5  Knee ext 5 5  Dorsiflexion 5 5    Reflexes: brisk reflexes symmetric in biceps, brachioradialis, 3+ bl patellae, and achilles. No clonus, toes down-going.  Sensory: Intact to light touch and vibration. Romberg is negative.   Coordination: FNF without ataxia or dysmetria.    Gait: Normal width, stride length, turn, with symmetric arm swing. Tandem walk intact. No difficulty balancing on one leg.     Laboratory:  Vit D 46   JCV 0.25   Imaging:    MRI brain and C-spine stable in 4/2021 as compared to 2019     Assessment/Plan:  Cindi Ferrer is a 22 year old female who presents for follow up for relapsing remitting multiple sclerosis who remains stable on natalizumab both clinically and radiologically.     # Syncope   We discussed her syncopal events. These are not likely related to gabapentin. These can sometimes be associated with MS but more time than not are just something that can happen to young people. We discussed importance of drinking fluids and to try to start the day with a cold glass of water to help keep the blood pressure up. Stay seated or laying if one of these events starts to come on. Increase overall fluid intake and use salt liberally in the diet. We checked orthostatic vitals and she did have a drop from 119/76 HR 59 to 106/66 HR 79 from supine to standing which is mildly positive for orthostasis.     She is taking vit D 5000 international unit(s) daily     # RRMS remains stable on natalizumab  -JCV, LFT, CBC w/diff, vitamin D   -MRI 6 months   -follow up 6 months       Patient seen and discussed with Dr. Wilson.  I have reviewed the plan with the patient, who is in agreement.      Daniela Posada DO  Multiple Sclerosis Fellow     Attending physician: I saw and evaluated the patient with Dr. Posada and I  agree with her findings and plan of care as documented above.    The patient and her family describe several episodes of brief loss of consciousness without post-ictal alteration of consciousness, most recently occurring in July. These sound most consistent with neurocardiogenic syncope, and we discussed conservative management with hydration and salt supplementation.     We will obtain routine laboratory studies for monitoring of natalizumab therapy today, to include complete blood counts with differential, hepatic panel, and ADRIANA virus serology. I will also check a vitamin D level today and advise her on supplementation with vitamin D as needed to maintain her level within the goal range of 60-80 mcg/L.    Remainder as above.    Coy Wilson MD   of Neurology  [facility]    Cc:  Danae Pinon MD (PCP)  Shiela Pinon CNP (Neurology-Headache)  Patient              Again, thank you for allowing me to participate in the care of your patient.      Sincerely,    Coy Wilson MD

## 2022-10-13 NOTE — LETTER
10/13/2022      RE: Cindi Ferrer  [address]  [place] [address]     Multiple Sclerosis Clinic Visit  10/13/2022    Reason: Multiple Sclerosis     Source of information: Patient and chart review    History of Present Symptom:  Cindi Ferrer is a 22 year old female with a PMH significant for multiple sclerosis who presents today for follow up.      She reports she has passed out about 5 times this past summer. This often happens when overly warm or fatigued. Often while sitting, or when trying to stand up. She will lose consciousness and only lasts a few minutes at most. No confusion afterwards. No memory of the event.     This started a few months after increasing gabapentin. Gabapentin was helpful for tingling and paresthesias.    She reports no difficulty getting her natalizumab and she is tolerating this well. She reports no new symptoms of weakness, numbness, double vision, or blurred vision.      Disease onset: 2017 Horizontal diplopia and left hemibody numbness. Diagnosed at that time based on MRI and + OCB in the CSF.   :   2017 started on natalizumab     Symptom management:  Fatigue: Taking modafinil 100 mg in the morning and around noon. Only taking as needed, 1/2 pill 1-2x/week. This worsens with cold.   Mood:  Bowel/bladder changes:  Gait/balance:   Neuropathic pain in arms and neck, relieved by gabapentin 600 mg TID.      Taking clonazepam about one time per week for tremor. Tremor described as one arm or leg bouncing sometimes associated with discomfort.     The patient's medical, surgical, social, and family history were personally reviewed with the patient.  Past Medical History:   Diagnosis Date     Multiple sclerosis (H) 09/2017      No past surgical history on file.  Social History     Tobacco Use     Smoking status: Never     Smokeless tobacco: Never   Substance Use Topics     Alcohol use: No     Drug use: No     No family history on file.  Current Outpatient Medications   Medication      "cholecalciferol (VITAMIN D3) 5000 units (125 mcg) capsule     clonazePAM (KLONOPIN) 0.5 MG tablet     gabapentin (NEURONTIN) 300 MG capsule     galcanezumab-gnlm (EMGALITY) 120 MG/ML injection     levonorgestrel-ethinyl estradiol (AVIANE) 0.1-20 MG-MCG tablet     levonorgestrel-ethinyl estradiol (AVIANE) 0.1-20 MG-MCG tablet     modafinil (PROVIGIL) 200 MG tablet     naproxen (NAPROSYN) 500 MG tablet     natalizumab (TYSABRI) 300 MG/15ML injection     nortriptyline (PAMELOR) 50 MG capsule     order for DME     prochlorperazine (COMPAZINE) 5 MG tablet     SUMAtriptan (IMITREX) 50 MG tablet     vitamin D3 (CHOLECALCIFEROL) 50 mcg (2000 units) tablet     No current facility-administered medications for this visit.     No Known Allergies      Review of Systems:  14-point review of systems was completed. The pertinent positives and negatives are in the HPI.    Physical Examination   Vitals: /59 (BP Location: Left arm, Patient Position: Chair, Cuff Size: Adult Regular)   Pulse 66   Ht 1.676 m (5' 6\")   Wt 80.8 kg (178 lb 1.6 oz)   SpO2 100%   BMI 28.75 kg/m       Orthostatics 103/65 seated, 119/76 supine, 106/66 standing   General: Patient appears comfortable in no acute distress.   HEENT: NC/AT, no icterus, moist mucous membranes  Chest: non-labored on RA  Extremities: Warm, no edema  Skin: No rash or lesion   Psych: Affect appropriate for situation   Neuro:  Mental status: Awake, alert, attentive. Language is fluent with intact comprehension of commands.  Cranial nerves: PERRL with no relative afferent pupillary defect, conjugate gaze, EOMI, visual fields intact, face symmetric, shoulder shrug strong, tongue protrusion/uvula midline, no dysarthria.   Motor: Normal muscle bulk and tone. No abnormal movements. 5/5 strength in 4/4 extremities.     R L  Deltoid  5 5  Biceps  5 5  Triceps  5 5  Wrist ext 5 5  Finger ext 5 5  Finger abd 5 5    Hip flexion 5 5  Knee flexion 5 5  Knee " ext 5 5  Dorsiflexion 5 5    Reflexes: brisk reflexes symmetric in biceps, brachioradialis, 3+ bl patellae, and achilles. No clonus, toes down-going.  Sensory: Intact to light touch and vibration. Romberg is negative.   Coordination: FNF without ataxia or dysmetria.    Gait: Normal width, stride length, turn, with symmetric arm swing. Tandem walk intact. No difficulty balancing on one leg.     Laboratory:  Vit D 46   JCV 0.25   Imaging:    MRI brain and C-spine stable in 4/2021 as compared to 2019     Assessment/Plan:  Cindi Ferrer is a 22 year old female who presents for follow up for relapsing remitting multiple sclerosis who remains stable on natalizumab both clinically and radiologically.     # Syncope   We discussed her syncopal events. These are not likely related to gabapentin. These can sometimes be associated with MS but more time than not are just something that can happen to young people. We discussed importance of drinking fluids and to try to start the day with a cold glass of water to help keep the blood pressure up. Stay seated or laying if one of these events starts to come on. Increase overall fluid intake and use salt liberally in the diet. We checked orthostatic vitals and she did have a drop from 119/76 HR 59 to 106/66 HR 79 from supine to standing which is mildly positive for orthostasis.     She is taking vit D 5000 international unit(s) daily     # RRMS remains stable on natalizumab  -JCV, LFT, CBC w/diff, vitamin D   -MRI 6 months   -follow up 6 months       Patient seen and discussed with Dr. Wilson.  I have reviewed the plan with the patient, who is in agreement.      Daniela Posada DO  Multiple Sclerosis Fellow     Attending physician: I saw and evaluated the patient with Dr. Posada and I agree with her findings and plan of care as documented above.    The patient and her family describe several episodes of brief loss of consciousness without post-ictal alteration of consciousness,  most recently occurring in July. These sound most consistent with neurocardiogenic syncope, and we discussed conservative management with hydration and salt supplementation.     We will obtain routine laboratory studies for monitoring of natalizumab therapy today, to include complete blood counts with differential, hepatic panel, and ADRIANA virus serology. I will also check a vitamin D level today and advise her on supplementation with vitamin D as needed to maintain her level within the goal range of 60-80 mcg/L.    Remainder as above.    Coy Wilson MD   of Neurology  [facility]    Cc:  Danae Pinon MD (PCP)  Shiela Pinon CNP (Neurology-Headache)  Patient

## 2022-10-14 LAB — DEPRECATED CALCIDIOL+CALCIFEROL SERPL-MC: 40 UG/L (ref 20–75)

## 2022-10-25 LAB — SCANNED LAB RESULT: ABNORMAL

## 2022-12-15 ENCOUNTER — TELEPHONE (OUTPATIENT)
Dept: NEUROLOGY | Facility: CLINIC | Age: 22
End: 2022-12-15

## 2022-12-15 NOTE — TELEPHONE ENCOUNTER
Central Prior Authorization Team   Phone: [phone]      PA Initiation    Medication: galcanezumab-gnlm (EMGALITY) 120 MG/ML injection  Insurance Company: [employer] [facility] - Phone [phone] Fax [phone]  Pharmacy Filling the Rx:    Filling Pharmacy Phone:    Filling Pharmacy Fax:    Start Date: 12/15/2022

## 2022-12-16 NOTE — TELEPHONE ENCOUNTER
PRIOR AUTHORIZATION DENIED    Medication: galcanezumab-gnlm (EMGALITY) 120 MG/ML injection    Denial Date: 12/15/2022    Denial Rational: fax received was missing pages, have asked that it be refaxed    Appeal Information: fax received was missing pages, have asked that it be refaxed

## 2023-01-01 NOTE — PATIENT INSTRUCTIONS
1. Blood tests today    2. Continue Tysabri    3. Try taking gabapentin 300 mg at bedtime in addition to the morning    4. Return in 6 months with MRI scans of the brain and cervical spine prior to the visit (same day fine)   Amoxicillin 80 mg/kg/day for 10 days  Tylenol for fussiness

## 2023-01-11 NOTE — TELEPHONE ENCOUNTER
Called insurance, spoke to rep [employer]. Asked for the denial letter to be resent. It is coming shortly.

## 2023-01-15 DIAGNOSIS — R25.1 TREMOR: ICD-10-CM

## 2023-01-17 ENCOUNTER — MYC MEDICAL ADVICE (OUTPATIENT)
Dept: NEUROLOGY | Facility: CLINIC | Age: 23
End: 2023-01-17
Payer: COMMERCIAL

## 2023-01-17 DIAGNOSIS — R53.82 CHRONIC FATIGUE: ICD-10-CM

## 2023-01-17 DIAGNOSIS — G35 MULTIPLE SCLEROSIS (H): ICD-10-CM

## 2023-01-17 NOTE — CONFIDENTIAL NOTE
Patient requesting refill of their modafinil; Patient was last seen in October and has follow up appointment in April with Dr. Wilson. Pended rx to Dr. Wilson for signature and will send electronically to the pharmacy once signed.    Daniela Medina RN

## 2023-01-18 ENCOUNTER — TELEPHONE (OUTPATIENT)
Dept: NEUROLOGY | Facility: CLINIC | Age: 23
End: 2023-01-18
Payer: COMMERCIAL

## 2023-01-18 NOTE — TELEPHONE ENCOUNTER
Medication Appeal Initiation    We have initiated an appeal for the requested medication:    Medication: galcanezumab-gnlm (EMGALITY) 120 MG/ML injection  Appeal Start Date:  1/18/2023  Insurance Company:    Comments:  Appeal has been initiated.  I have faxed original denial letter along with Letter of Medical Necessity (letters tab) to [facility] - Attn: Appeals, fax# [phone]. Marked for urgent review.

## 2023-01-18 NOTE — TELEPHONE ENCOUNTER
No more than 2 a month at most since starting and I usually will use the sumatriptan for it.        You  Cindi Ferrer 32 minutes ago (6:44 AM)     PENNY  How many headache days are you experiencing a month and how much or how often are you having to take sumatriptan,naproxen or excedrin?     Thank you  Thao Ferrer  You 56 minutes ago (6:20 AM)     I definitely don t have that if anything emgality has significantly improved my migraines and I do not have any symptoms from this medication to date. I use to have nausea from the migraines before starting this medication and even that significantly went down.        You  Cindi Ferrer 17 hours ago (1:28 PM)     TS  Hi Cindi,     We are trying to get your emgality covered by your insurance but they want to know that you  Do not have medication overuse headaches.  Please respond in detail and I will submit your response.     Thao HOLM

## 2023-01-20 ENCOUNTER — TELEPHONE (OUTPATIENT)
Dept: NEUROLOGY | Facility: CLINIC | Age: 23
End: 2023-01-20
Payer: COMMERCIAL

## 2023-01-20 RX ORDER — MODAFINIL 200 MG/1
TABLET ORAL
Qty: 30 TABLET | Refills: 5 | Status: SHIPPED | OUTPATIENT
Start: 2023-01-20 | End: 2023-11-10

## 2023-01-20 RX ORDER — CLONAZEPAM 0.5 MG/1
TABLET ORAL
Qty: 60 TABLET | Refills: 5 | Status: SHIPPED | OUTPATIENT
Start: 2023-01-20 | End: 2023-10-12

## 2023-01-20 NOTE — TELEPHONE ENCOUNTER
MEDICATION APPEAL APPROVED    Medication: galcanezumab-gnlm (EMGALITY) 120 MG/ML injection  Authorization Effective Date:  01/01/2023  Authorization Expiration Date:  01/20/2024  Approved Dose/Quantity: 1ml per 28 days  Reference #:     Insurance Company:  [employer]  Expected CoPay:       Which Pharmacy is filling the prescription (Not needed for infusion/clinic administered): [id number] - [place], [place] - [address]  AT [address] [place] [address] & [facility]  Pharmacy Notified: Yes

## 2023-01-20 NOTE — TELEPHONE ENCOUNTER
Will update the encounter for the Prior Authorization & Appeal with this approval information. The approval letter is sure to be coming soon as well.

## 2023-01-20 NOTE — TELEPHONE ENCOUNTER
[facility] Call Center    Phone Message    May a detailed message be left on voicemail: yes     Reason for Call: Medication Question or concern regarding medication   Prescription Clarification  Name of Medication: galcanezumab-gnlm (EMGALITY) 120 MG/ML injection  Prescribing Provider: Rafael Pinon   Pharmacy: [id number] - [place], [place] - [address]  AT [address] [place] [address] & [facility] [address]   What on the order needs clarification? Sloan from [email or web address] is calling because the Pt is approved for the medication for 12 months. Start date of 1/1/23 and end date of 1/20/24.    If there are any questions please call [employer]    Action Taken: Message routed to:  [facility] Neurology    Travel Screening: Not Applicable

## 2023-03-06 ENCOUNTER — TELEPHONE (OUTPATIENT)
Dept: NEUROLOGY | Facility: CLINIC | Age: 23
End: 2023-03-06
Payer: COMMERCIAL

## 2023-03-06 NOTE — TELEPHONE ENCOUNTER
Received faxed request for chart notes from [facility] to continue Tysabri approval. Faxed 10/13/22 visit notes to [phone].

## 2023-03-20 ENCOUNTER — DOCUMENTATION ONLY (OUTPATIENT)
Dept: NEUROLOGY | Facility: CLINIC | Age: 23
End: 2023-03-20
Payer: COMMERCIAL

## 2023-03-24 ENCOUNTER — DOCUMENTATION ONLY (OUTPATIENT)
Dept: NEUROLOGY | Facility: CLINIC | Age: 23
End: 2023-03-24
Payer: COMMERCIAL

## 2023-03-24 NOTE — PROGRESS NOTES
Tysabri approval received valid from 03/23/2023 through 10/23/2023   Brennan AQUINO 03/24/2023 10:12AM

## 2023-04-05 ENCOUNTER — TRANSFERRED RECORDS (OUTPATIENT)
Dept: HEALTH INFORMATION MANAGEMENT | Facility: CLINIC | Age: 23
End: 2023-04-05

## 2023-04-10 ENCOUNTER — DOCUMENTATION ONLY (OUTPATIENT)
Dept: NEUROLOGY | Facility: CLINIC | Age: 23
End: 2023-04-10
Payer: COMMERCIAL

## 2023-04-12 ENCOUNTER — DOCUMENTATION ONLY (OUTPATIENT)
Dept: NEUROLOGY | Facility: CLINIC | Age: 23
End: 2023-04-12
Payer: COMMERCIAL

## 2023-04-12 NOTE — PROGRESS NOTES
Annual renewal orders for tysabri received from [facility].   Order placed in Dr. Wilson's folder for review and signature.  Brennan Aguirre EMT 04/12/2023 8:55AM

## 2023-04-13 ENCOUNTER — TRANSFERRED RECORDS (OUTPATIENT)
Dept: HEALTH INFORMATION MANAGEMENT | Facility: CLINIC | Age: 23
End: 2023-04-13

## 2023-04-13 ENCOUNTER — MEDICAL CORRESPONDENCE (OUTPATIENT)
Dept: HEALTH INFORMATION MANAGEMENT | Facility: CLINIC | Age: 23
End: 2023-04-13

## 2023-04-13 ENCOUNTER — LAB (OUTPATIENT)
Dept: LAB | Facility: CLINIC | Age: 23
End: 2023-04-13
Payer: COMMERCIAL

## 2023-04-13 ENCOUNTER — ANCILLARY PROCEDURE (OUTPATIENT)
Dept: MRI IMAGING | Facility: CLINIC | Age: 23
End: 2023-04-13
Attending: PSYCHIATRY & NEUROLOGY
Payer: COMMERCIAL

## 2023-04-13 ENCOUNTER — OFFICE VISIT (OUTPATIENT)
Dept: NEUROLOGY | Facility: CLINIC | Age: 23
End: 2023-04-13
Attending: PSYCHIATRY & NEUROLOGY
Payer: COMMERCIAL

## 2023-04-13 VITALS — SYSTOLIC BLOOD PRESSURE: 120 MMHG | OXYGEN SATURATION: 100 % | HEART RATE: 69 BPM | DIASTOLIC BLOOD PRESSURE: 82 MMHG

## 2023-04-13 DIAGNOSIS — M79.2 NEUROPATHIC PAIN: ICD-10-CM

## 2023-04-13 DIAGNOSIS — R25.1 TREMOR: ICD-10-CM

## 2023-04-13 DIAGNOSIS — G35 MS (MULTIPLE SCLEROSIS) (H): ICD-10-CM

## 2023-04-13 DIAGNOSIS — R53.82 CHRONIC FATIGUE: ICD-10-CM

## 2023-04-13 DIAGNOSIS — G35 MS (MULTIPLE SCLEROSIS) (H): Primary | ICD-10-CM

## 2023-04-13 DIAGNOSIS — E55.9 VITAMIN D DEFICIENCY: ICD-10-CM

## 2023-04-13 DIAGNOSIS — R55 SPELL OF LOSS OF CONSCIOUSNESS: ICD-10-CM

## 2023-04-13 LAB
ALBUMIN SERPL BCG-MCNC: 4.3 G/DL (ref 3.5–5.2)
ALP SERPL-CCNC: 112 U/L (ref 35–104)
ALT SERPL W P-5'-P-CCNC: 15 U/L (ref 10–35)
AST SERPL W P-5'-P-CCNC: 19 U/L (ref 10–35)
BASOPHILS # BLD MANUAL: 0 10E3/UL (ref 0–0.2)
BASOPHILS NFR BLD MANUAL: 0 %
BILIRUB DIRECT SERPL-MCNC: <0.2 MG/DL (ref 0–0.3)
BILIRUB SERPL-MCNC: 0.4 MG/DL
EOSINOPHIL # BLD MANUAL: 0.2 10E3/UL (ref 0–0.7)
EOSINOPHIL NFR BLD MANUAL: 2 %
ERYTHROCYTE [DISTWIDTH] IN BLOOD BY AUTOMATED COUNT: 13.5 % (ref 10–15)
HCT VFR BLD AUTO: 38.3 % (ref 35–47)
HGB BLD-MCNC: 12.9 G/DL (ref 11.7–15.7)
LYMPHOCYTES # BLD MANUAL: 5.3 10E3/UL (ref 0.8–5.3)
LYMPHOCYTES NFR BLD MANUAL: 46 %
MCH RBC QN AUTO: 28.9 PG (ref 26.5–33)
MCHC RBC AUTO-ENTMCNC: 33.7 G/DL (ref 31.5–36.5)
MCV RBC AUTO: 86 FL (ref 78–100)
MONOCYTES # BLD MANUAL: 0.6 10E3/UL (ref 0–1.3)
MONOCYTES NFR BLD MANUAL: 5 %
MYELOCYTES # BLD MANUAL: 0.1 10E3/UL
MYELOCYTES NFR BLD MANUAL: 1 %
NEUTROPHILS # BLD MANUAL: 5.3 10E3/UL (ref 1.6–8.3)
NEUTROPHILS NFR BLD MANUAL: 46 %
PLAT MORPH BLD: ABNORMAL
PLATELET # BLD AUTO: 336 10E3/UL (ref 150–450)
PROT SERPL-MCNC: 7.4 G/DL (ref 6.4–8.3)
RBC # BLD AUTO: 4.47 10E6/UL (ref 3.8–5.2)
RBC MORPH BLD: ABNORMAL
WBC # BLD AUTO: 11.6 10E3/UL (ref 4–11)

## 2023-04-13 PROCEDURE — 70553 MRI BRAIN STEM W/O & W/DYE: CPT | Mod: GC | Performed by: RADIOLOGY

## 2023-04-13 PROCEDURE — 36415 COLL VENOUS BLD VENIPUNCTURE: CPT | Performed by: PATHOLOGY

## 2023-04-13 PROCEDURE — 80076 HEPATIC FUNCTION PANEL: CPT | Performed by: PATHOLOGY

## 2023-04-13 PROCEDURE — G0463 HOSPITAL OUTPT CLINIC VISIT: HCPCS

## 2023-04-13 PROCEDURE — 85027 COMPLETE CBC AUTOMATED: CPT | Performed by: PATHOLOGY

## 2023-04-13 PROCEDURE — 85007 BL SMEAR W/DIFF WBC COUNT: CPT | Performed by: PATHOLOGY

## 2023-04-13 PROCEDURE — 99214 OFFICE O/P EST MOD 30 MIN: CPT | Performed by: PSYCHIATRY & NEUROLOGY

## 2023-04-13 PROCEDURE — 82306 VITAMIN D 25 HYDROXY: CPT | Performed by: PSYCHIATRY & NEUROLOGY

## 2023-04-13 PROCEDURE — 99000 SPECIMEN HANDLING OFFICE-LAB: CPT | Performed by: PATHOLOGY

## 2023-04-13 PROCEDURE — 72156 MRI NECK SPINE W/O & W/DYE: CPT | Mod: GC | Performed by: RADIOLOGY

## 2023-04-13 PROCEDURE — A9585 GADOBUTROL INJECTION: HCPCS | Performed by: RADIOLOGY

## 2023-04-13 PROCEDURE — 83516 IMMUNOASSAY NONANTIBODY: CPT | Mod: 90 | Performed by: PATHOLOGY

## 2023-04-13 RX ORDER — GADOBUTROL 604.72 MG/ML
10 INJECTION INTRAVENOUS ONCE
Status: COMPLETED | OUTPATIENT
Start: 2023-04-13 | End: 2023-04-13

## 2023-04-13 RX ADMIN — GADOBUTROL 8 ML: 604.72 INJECTION INTRAVENOUS at 14:32

## 2023-04-13 ASSESSMENT — PAIN SCALES - GENERAL: PAINLEVEL: NO PAIN (0)

## 2023-04-13 NOTE — PATIENT INSTRUCTIONS
Continue Tysabri infusions    2.   Continue gabapentin, modafinil, and clonazepam as you are doing    3. Blood tests today    4. We will schedule an EEG study and advise you of those results    5. Return to clinic in 6 months

## 2023-04-13 NOTE — DISCHARGE INSTRUCTIONS
MRI Contrast Discharge Instructions    The IV contrast you received today will pass out of your body in your  urine. This will happen in the next 24 hours. You will not feel this process.  Your urine will not change color.    Drink at least 4 extra glasses of water or juice today (unless your doctor  has restricted your fluids). This reduces the stress on your kidneys.  You may take your regular medicines.    If you are on dialysis: It is best to have dialysis today.    If you have a reaction: Most reactions happen right away. If you have  any new symptoms after leaving the hospital (such as hives or swelling),  call your hospital at the correct number below. Or call your family doctor.  If you have breathing distress or wheezing, call 911.    Special instructions: ***    I have read and understand the above information.    Signature:______________________________________ Date:___________    Staff:__________________________________________ Date:___________     Time:__________    [place] [facility] Departments:    ___[place]: [phone]  ___[facility]: [phone]  ___[place]: [phone] ___[facility]: [phone]  ___[facility]: [phone]  ___[facility]: [phone]  ___[phone]  ___[facility]: [phone]  ___[phone]

## 2023-04-13 NOTE — NURSING NOTE
Chief Complaint   Patient presents with     MS     RECHECK     6 month follow up      Vitals were taken and medications were reconciled.   Brennan Aguirre, EMT  3:49 PM

## 2023-04-13 NOTE — DISCHARGE INSTRUCTIONS
MRI Contrast Discharge Instructions    The IV contrast you received today will pass out of your body in your  urine. This will happen in the next 24 hours. You will not feel this process.  Your urine will not change color.    Drink at least 4 extra glasses of water or juice today (unless your doctor  has restricted your fluids). This reduces the stress on your kidneys.  You may take your regular medicines.    If you are on dialysis: It is best to have dialysis today.    If you have a reaction: Most reactions happen right away. If you have  any new symptoms after leaving the hospital (such as hives or swelling),  call your hospital at the correct number below. Or call your family doctor.  If you have breathing distress or wheezing, call 911.    Special instructions: ***    I have read and understand the above information.    Signature:______________________________________ Date:___________    Staff:__________________________________________ Date:___________     Time:__________    [place] [facility] Departments:    ___[place]: [phone]  ___[facility]: [phone]  ___[place]: [phone] ___[facility]: [phone]  ___[facility]: [phone]  ___[facility]: [phone]  ___[phone]  ___[facility]: [phone]  ___[phone]

## 2023-04-13 NOTE — Clinical Note
4/13/2023       RE: Cindi Ferrer  [address]  [place] [address]     Dear Colleague,    Thank you for referring your patient, Cindi Ferrer, to the [facility] MULTIPLE SCLEROSIS CLINIC [facility]. Please see a copy of my visit note below.    No notes on file    Again, thank you for allowing me to participate in the care of your patient.      Sincerely,    Coy Wilson MD

## 2023-04-14 ENCOUNTER — TELEPHONE (OUTPATIENT)
Dept: NEUROLOGY | Facility: CLINIC | Age: 23
End: 2023-04-14
Payer: COMMERCIAL

## 2023-04-14 ENCOUNTER — DOCUMENTATION ONLY (OUTPATIENT)
Dept: NEUROLOGY | Facility: CLINIC | Age: 23
End: 2023-04-14
Payer: COMMERCIAL

## 2023-04-14 LAB — DEPRECATED CALCIDIOL+CALCIFEROL SERPL-MC: 34 UG/L (ref 20–75)

## 2023-04-14 NOTE — TELEPHONE ENCOUNTER
Health Call Center    Phone Message    May a detailed message be left on voicemail: yes     Reason for Call: Other: [facility] is calling to discuss denial fax that was sent over to the clinic for Tysabri infusion. Would like this to be appealed starting 4/21/23. Patient is due for his next infusion soon. Please submit appeal ASAP.       If any questions and updates, please contact [facility] back at [phone]    Action Taken: Message routed to:  [facility] MATTHIAS BLANCHARD    Travel Screening: Not Applicable

## 2023-04-17 ENCOUNTER — DOCUMENTATION ONLY (OUTPATIENT)
Dept: NEUROLOGY | Facility: CLINIC | Age: 23
End: 2023-04-17
Payer: COMMERCIAL

## 2023-04-17 NOTE — PROGRESS NOTES
Annual orders for tysabri have been signed and faxed back at [phone]  Brennan Aguirre 04/17/2023 11:06AM

## 2023-04-17 NOTE — PROGRESS NOTES
Annual orders for Tysabri received from [facility] and placed in Dr. Jenkins's folder for review and signature.  Brennan Aguirre EMT 04/17/2023 10:58AM

## 2023-04-19 NOTE — PROGRESS NOTES
"Date of service: April 13, 2023    Referral source: Established patient    Chief complaint: Multiple sclerosis    History of the Present Illness: Ms. Cindi Ferrer is a 22-year-old woman who returns to the [facility] today for a scheduled follow-up visit after MRI scans of the brain and cervical spine performed earlier on today's date.    The patient's history is as per my previous notes.  She initially developed symptoms of demyelinating disease in 2017 when she had horizontal diplopia and left hemibody numbness.  She was admitted to an outside pediatric facility at that time.  MRI scans demonstrated T2 hyperintense lesions suggestive of demyelinating disease of the type seen in multiple sclerosis, and a CSF examination was also positive for oligoclonal bands.  She started disease-modifying therapy with natalizumab, and has remained on that treatment to date.    Today regarding MS symptoms, she relates that there have been no major changes.  She denies any new episodic changes in vision, balance, strength or sensation suggestive of new relapse of multiple sclerosis since she was last seen.    She was evaluated in an outside emergency department on 04/05/2023 after what she describes as a loss of consciousness at home.  She had mentioned episodes of loss of consciousness at her last visit in October as well, which I felt were most consistent with neurocardiogenic syncope.  On this occasion, however, she relates that her family told her that her \"eyes rolled back\" and that she was \"shaking uncontrollably.\"  She did regain normal consciousness quite quickly after this event.  She is currently wearing a cardiac monitor that was ordered by her primary care provider.    Regarding other symptoms, she is generally stable.  She remains on gabapentin 600 mg in the morning and afternoon and 900 mg at bedtime for neuropathic pain, which she reports is working well for her.    Regarding fatigue, she is on " modafinil 200 mg daily as needed for fatigue, which she relates that she takes on most days.      Occasionally, she will take clonazepam 0.5-1 mg as needed for tremor, approximately once weekly at present.    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    PHYSICAL EXAMINATION:    VITAL SIGNS:  Blood pressure 120/82, pulse 69, oxygen saturation 100%.  GENERAL:  Well-nourished young adult woman who presents to the evaluation alone, awake and alert and in no acute distress.    Investigations: I reviewed images from MRI scans of the brain and cervical spine performed earlier on today's date, and the following is my independent interpretation of those images.  There is no abnormal enhancement with gadolinium contrast throughout the brain and visualized cord parenchyma.  Again seen are multiple periventricular, juxtacortical and infratentorial foci of T2 hyperintensity in the brain.  There are technical differences between this study and her previous MRI from 2021, including the current imaging having been performed with a higher field magnet.  Radiology favored any differences between the studies as being likely due to technical factors, and I would agree with that.  MRI scan of the cervical spine similarly showed stable T2 hyperintensity from C2-C5, with no new lesions seen.    Assessment/plan:     1.  Multiple sclerosis  The patient is clinically and radiologically stable with no evidence of active inflammatory demyelination on current disease-modifying therapy with natalizumab, which she will continue.  Today I will check routine laboratory studies for monitoring of this medication to include complete blood counts with differential, hepatic panel, ADRIANA virus serology and anti-natalizumab antibodies.  I will also check a vitamin D level and advise her on supplementation with vitamin D as needed to maintain her level within the goal range of  60-80 mcg/L.    I would like to see her back in 6 months for a review.    2.  Loss of consciousness  Differential diagnosis for the event described includes syncopal event with convulsive twitching, psychogenic nonepileptic spell, generalized tonic-clonic seizure (although rapid recovery of consciousness after this event would argue against this), less likely cardiac arrhythmia, although ongoing cardiac monitoring is warranted to make sure there are no concerns in this regard.  I do think it would be reasonable to obtain a 3 hour video EEG for her to screen for any electrical abnormalities consistent with predisposition to seizure, and we will arrange this.    3.  Neuropathic pain  This is under generally good control on her current regimen of gabapentin, which she will continue.    4.  Chronic fatigue  She will remain on modafinil 200 mg daily as needed for fatigue.    5.  Tremor  She will continue her occasional use of clonazepam doses up to 1 mg daily as needed for control of tremor symptoms.    Coy Wilson MD        D: 2023   T: 2023   MT: [email or web address]    Name:     KATERINA WHITEN:      -71        Account:      [id number]   :      2000           Service Date: 2023       Document: [id number]

## 2023-04-21 LAB — NATALIZUMAB AB SER QL: NEGATIVE

## 2023-04-24 DIAGNOSIS — M79.2 NEUROPATHIC PAIN: ICD-10-CM

## 2023-04-24 LAB — SCANNED LAB RESULT: NORMAL

## 2023-04-25 ENCOUNTER — DOCUMENTATION ONLY (OUTPATIENT)
Dept: NEUROLOGY | Facility: CLINIC | Age: 23
End: 2023-04-25
Payer: COMMERCIAL

## 2023-04-25 RX ORDER — GABAPENTIN 300 MG/1
CAPSULE ORAL
Qty: 180 CAPSULE | Refills: 11 | Status: SHIPPED | OUTPATIENT
Start: 2023-04-25 | End: 2024-06-04

## 2023-04-25 NOTE — PROGRESS NOTES
Prior authorization received from [facility] and [facility] for tysabri, faxed over to Brenda Barlow at [phone]   Brennan Aguirre EMT 04/25/2023 11:32AM

## 2023-04-25 NOTE — TELEPHONE ENCOUNTER
Spoke with [facility] and submitted a new prior authorization for Tysabri approval. Case #[id number].  Also faxed updated labs to [facility]. Clinic will be notified of a decision in 24 hours.    Daniela Medina RN

## 2023-04-25 NOTE — TELEPHONE ENCOUNTER
Received refill request for gabapentin from [facility]; Patient was last seen in April 2023 and has follow up appointment in October 2023 with Dr Wilson. Refilled per [facility] refill protocol.    Jerrica Garner RN

## 2023-04-26 NOTE — TELEPHONE ENCOUNTER
Received prior auth approval for continuation of Tysabri for Cindi.  Approval letter faxed to [employer] (fax [phone]).    Daniela Medina RN

## 2023-04-27 RX ORDER — DIPHENHYDRAMINE HYDROCHLORIDE 50 MG/ML
50 INJECTION INTRAMUSCULAR; INTRAVENOUS
Start: 2023-04-27

## 2023-04-27 RX ORDER — HEPARIN SODIUM (PORCINE) LOCK FLUSH IV SOLN 100 UNIT/ML 100 UNIT/ML
5 SOLUTION INTRAVENOUS
OUTPATIENT
Start: 2023-04-27

## 2023-04-27 RX ORDER — EPINEPHRINE 1 MG/ML
0.3 INJECTION, SOLUTION, CONCENTRATE INTRAVENOUS EVERY 5 MIN PRN
OUTPATIENT
Start: 2023-04-27

## 2023-04-27 RX ORDER — ALBUTEROL SULFATE 0.83 MG/ML
2.5 SOLUTION RESPIRATORY (INHALATION)
OUTPATIENT
Start: 2023-04-27

## 2023-04-27 RX ORDER — METHYLPREDNISOLONE SODIUM SUCCINATE 125 MG/2ML
125 INJECTION, POWDER, LYOPHILIZED, FOR SOLUTION INTRAMUSCULAR; INTRAVENOUS
Start: 2023-04-27

## 2023-04-27 RX ORDER — ALBUTEROL SULFATE 90 UG/1
1-2 AEROSOL, METERED RESPIRATORY (INHALATION)
Start: 2023-04-27

## 2023-04-27 RX ORDER — HEPARIN SODIUM,PORCINE 10 UNIT/ML
5 VIAL (ML) INTRAVENOUS
OUTPATIENT
Start: 2023-04-27

## 2023-04-27 NOTE — TELEPHONE ENCOUNTER
Health Call Center    Phone Message    May a detailed message be left on voicemail: yes     Reason for Call: Other: Taya with [facility]  is calling because the Pt is a week late on the infusion due to insurance. Pt is symptomatic from being over a week on her infusion. Pt is experiencing weakness and Pt balance is off. Taya is wondering if the provider would put in a order for the Pt to get the infusion tomorrow. Please call Taya to discuss    Action Taken: Message routed to:  [facility] MATTHIAS [facility]    Travel Screening: Not Applicable

## 2023-04-27 NOTE — TELEPHONE ENCOUNTER
Spoke with Taya at [facility].  Cindi scheduled to infuse Tysabri today at 4:30 (one week late due to insurance issues).  When going over the Touch checklist, Cindi endorsed worsening symptoms, including fatigue and balance issues.  Taya needs documentation from Dr. Wilson that it is ok for Cindi to proceed with today's infusion.  Orders pended to Dr. Wilson.    Daniela Medina RN

## 2023-04-28 ENCOUNTER — TRANSFERRED RECORDS (OUTPATIENT)
Dept: HEALTH INFORMATION MANAGEMENT | Facility: CLINIC | Age: 23
End: 2023-04-28

## 2023-05-01 ENCOUNTER — TRANSFERRED RECORDS (OUTPATIENT)
Dept: HEALTH INFORMATION MANAGEMENT | Facility: CLINIC | Age: 23
End: 2023-05-01

## 2023-05-05 ENCOUNTER — DOCUMENTATION ONLY (OUTPATIENT)
Dept: NEUROLOGY | Facility: CLINIC | Age: 23
End: 2023-05-05
Payer: COMMERCIAL

## 2023-05-05 NOTE — PROGRESS NOTES
Approval for tysabri received from [facility], valid from 04/25/2023 through 04/24/2024   Brennan AQUINO 05/05/2023 2:48PM

## 2023-05-11 ENCOUNTER — VIRTUAL VISIT (OUTPATIENT)
Dept: NEUROLOGY | Facility: CLINIC | Age: 23
End: 2023-05-11
Payer: COMMERCIAL

## 2023-05-11 DIAGNOSIS — G43.009 MIGRAINE WITHOUT AURA AND WITHOUT STATUS MIGRAINOSUS, NOT INTRACTABLE: ICD-10-CM

## 2023-05-11 DIAGNOSIS — G43.709 CHRONIC MIGRAINE WITHOUT AURA WITHOUT STATUS MIGRAINOSUS, NOT INTRACTABLE: ICD-10-CM

## 2023-05-11 PROCEDURE — 99214 OFFICE O/P EST MOD 30 MIN: CPT | Mod: VID | Performed by: NURSE PRACTITIONER

## 2023-05-11 RX ORDER — PROCHLORPERAZINE MALEATE 5 MG
5-10 TABLET ORAL EVERY 6 HOURS PRN
Qty: 20 TABLET | Refills: 3 | Status: SHIPPED | OUTPATIENT
Start: 2023-05-11

## 2023-05-11 RX ORDER — SUMATRIPTAN 50 MG/1
50-100 TABLET, FILM COATED ORAL
Qty: 12 TABLET | Refills: 9 | Status: SHIPPED | OUTPATIENT
Start: 2023-05-11 | End: 2023-05-11

## 2023-05-11 RX ORDER — FERROUS SULFATE 325(65) MG
325 TABLET ORAL
COMMUNITY

## 2023-05-11 RX ORDER — SUMATRIPTAN 50 MG/1
50-100 TABLET, FILM COATED ORAL
Qty: 12 TABLET | Refills: 9 | Status: SHIPPED | OUTPATIENT
Start: 2023-05-11

## 2023-05-11 ASSESSMENT — MIGRAINE DISABILITY ASSESSMENT (MIDAS)
HOW MANY DAYS DID YOU MISS WORK OR SCHOOL BECAUSE OF HEADACHES: 0
HOW MANY DAYS IN THE PAST 3 MONTHS HAVE YOU HAD A HEADACHE: 5
HOW MANY DAYS DID YOU NOT DO HOUSEWORK BECAUSE OF HEADACHES: 3
TOTAL SCORE: 13
ON A SCALE FROM 0-10 ON AVERAGE HOW PAINFUL WERE HEADACHES: 8
HOW MANY DAYS WAS HOUSEWORK PRODUCTIVITY CUT IN HALF DUE TO HEADACHES: 5
HOW OFTEN WERE SOCIAL ACTIVITIES MISSED DUE TO HEADACHES: 2
HOW MANY DAYS WAS YOUR PRODUCTIVITY CUT IN HALF BECAUSE OF HEADACHES: 3

## 2023-05-11 ASSESSMENT — HEADACHE IMPACT TEST (HIT 6)
HOW OFTEN DO HEADACHES LIMIT YOUR DAILY ACTIVITIES: VERY OFTEN
HOW OFTEN HAVE YOU FELT TOO TIRED TO WORK BECAUSE OF YOUR HEADACHES: SOMETIMES
HIT6 TOTAL SCORE: 65
WHEN YOU HAVE HEADACHES HOW OFTEN IS THE PAIN SEVERE: VERY OFTEN
HOW OFTEN HAVE YOU FELT FED UP OR IRRITATED BECAUSE OF YOUR HEADACHES: SOMETIMES
WHEN YOU HAVE A HEADACHE HOW OFTEN DO YOU WISH YOU COULD LIE DOWN: ALWAYS
HOW OFTEN DID HEADACHS LIMIT CONCENTRATION ON WORK OR DAILY ACTIVITY: SOMETIMES

## 2023-05-11 NOTE — LETTER
"2023       RE: Cindi Ferrer  [address] [place] [address]       Dear Colleague,    Thank you for referring your patient, Cindi Ferrer, to the [facility] [place] at [facility]. Please see a copy of my visit note below.    Cindi is a 22 year old who is being evaluated via a billable video visit.        Subjective   Cindi is a 22 year old, presenting for the following health issues:  Video Visit (Follow up- Migraines )    HPI   Significantly down since starting Emgality.  Headaches down to once per month that needs to be treated vs 20+ per month and a lot better than before.  No side effects    Sumatriptan definitely works as needed   No side effects.   Take sumatriptan once per month.   Nausea with migraine headaches and prochlorperazine . No side effects.   No side effects with any medications for headaches.     Discussed that would need to stop Emgality 3 months before anticipated pregnancy. Wonders of side effects and reviewed     Took spring semester off and thinking of PA School.     PLAN:  Continue Emgality for prevention  Rescue treatment -naproxen, sumatriptan and prochlorperazine as needed   Follow up in 9-12 months if stable or sooner if needed     Objective    Vitals - Patient Reported  Weight (Patient Reported): 79.4 kg (175 lb)  Height (Patient Reported): 165.1 cm (5' 5\")  BMI (Based on Pt Reported Ht/Wt): 29.12  Pain Score: No Pain (0)        Physical Exam   GENERAL: Healthy, alert and no distress  EYES: Eyes grossly normal to inspection.  RESP: No audible wheeze, cough, or visible cyanosis.  No visible retractions or increased work of breathing.    SKIN: Visible skin clear. NEURO: Face is symmetrical and symmetrical facial expressions, no weakness  Mentation and speech appropriate for age.  PSYCH: Mentation appears normal, affect normal/bright, judgement and insight intact, normal speech and appearance " well-groomed.    I discussed all my recommendations with Cindi Ferrer who verbalizes understanding and comfortable with the plan.    31 minutes spent on the date of the encounter doing video access, chart  review,  meds review, treatment plan, documentation and further activities as noted above          Again, thank you for allowing me to participate in the care of your patient.      Sincerely,    EDISON Chinchilla CNP

## 2023-05-11 NOTE — NURSING NOTE
Is the patient currently in the state of [place]? YES    Visit mode:VIDEO    If the visit is dropped, the patient can be reconnected by: VIDEO VISIT: Text to cell phone: [phone]    Will anyone else be joining the visit? NO      How would you like to obtain your AVS? MyChart    Are changes needed to the allergy or medication list? NO    Reason for visit: Video Visit (Follow up- Migraines )

## 2023-05-11 NOTE — PATIENT INSTRUCTIONS
PLAN:  Continue Emgality for prevention  Rescue treatment -naproxen, sumatriptan and prochlorperazine as needed   Follow up in 9-12 months if stable or sooner if needed

## 2023-05-11 NOTE — PROGRESS NOTES
"Cindi is a 22 year old who is being evaluated via a billable video visit.        Subjective   Cindi is a 22 year old, presenting for the following health issues:  Video Visit (Follow up- Migraines )    HPI   Significantly down since starting Emgality.  Headaches down to once per month that needs to be treated vs 20+ per month and a lot better than before.  No side effects    Sumatriptan definitely works as needed   No side effects.   Take sumatriptan once per month.   Nausea with migraine headaches and prochlorperazine . No side effects.   No side effects with any medications for headaches.     Discussed that would need to stop Emgality 3 months before anticipated pregnancy. Wonders of side effects and reviewed     Took spring semester off and thinking of PA School.     PLAN:  Continue Emgality for prevention  Rescue treatment -naproxen, sumatriptan and prochlorperazine as needed   Follow up in 9-12 months if stable or sooner if needed     Objective    Vitals - Patient Reported  Weight (Patient Reported): 79.4 kg (175 lb)  Height (Patient Reported): 165.1 cm (5' 5\")  BMI (Based on Pt Reported Ht/Wt): 29.12  Pain Score: No Pain (0)        Physical Exam   GENERAL: Healthy, alert and no distress  EYES: Eyes grossly normal to inspection.  RESP: No audible wheeze, cough, or visible cyanosis.  No visible retractions or increased work of breathing.    SKIN: Visible skin clear. NEURO: Face is symmetrical and symmetrical facial expressions, no weakness  Mentation and speech appropriate for age.  PSYCH: Mentation appears normal, affect normal/bright, judgement and insight intact, normal speech and appearance well-groomed.    I discussed all my recommendations with Cindi Ferrer who verbalizes understanding and comfortable with the plan.    31 minutes spent on the date of the encounter doing video access, chart  review,  meds review, treatment plan, documentation and further activities as noted above    Shiela Pinon, " APRN, CNP [facility]  Headache certified  [facility]      Video-Visit Details    Type of service:  Video Visit   Originating Location (pt. Location): Home  Distant Location (provider location):  Off-site  Platform used for Video Visit: Radha

## 2023-05-15 ENCOUNTER — TELEPHONE (OUTPATIENT)
Dept: NEUROLOGY | Facility: CLINIC | Age: 23
End: 2023-05-15
Payer: COMMERCIAL

## 2023-05-15 NOTE — TELEPHONE ENCOUNTER
Left Voicemail (1st Attempt) and Sent Mychart (1st Attempt) for the patient to call back and schedule the following:    Appointment type: follow up  Provider: Zi Kuo CNP  Return date: 1 year  Specialty phone number: [phone]  Additional appointment(s) needed: NONE  Additonal Notes: LVM, sent MyC 5/15 NP

## 2023-05-19 ENCOUNTER — APPOINTMENT (OUTPATIENT)
Dept: INTERPRETER SERVICES | Facility: CLINIC | Age: 23
End: 2023-05-19
Payer: COMMERCIAL

## 2023-05-19 ENCOUNTER — TELEPHONE (OUTPATIENT)
Dept: NEUROLOGY | Facility: CLINIC | Age: 23
End: 2023-05-19
Payer: COMMERCIAL

## 2023-05-19 NOTE — TELEPHONE ENCOUNTER
Left Voicemail (2nd Attempt) for the patient to call back and schedule the following:    Appointment type: follow up  Provider: Shiela Pinon CNP  Return date: 1 year  Specialty phone number: [phone]  Additional appointment(s) needed: N/A  Additonal Notes: LVM with  help x2, sent a MyC message.    Kiarra Cesar on 5/19/2023 at 11:48 AM

## 2023-05-30 ENCOUNTER — DOCUMENTATION ONLY (OUTPATIENT)
Dept: NEUROLOGY | Facility: CLINIC | Age: 23
End: 2023-05-30
Payer: COMMERCIAL

## 2023-05-30 NOTE — PROGRESS NOTES
Authorization confirmation received from [employer]. Dates of Service 05/12/2023 through 07/10/2023   Brennan Aguirre EMT 05/30/2023 2:09PM

## 2023-06-19 ENCOUNTER — DOCUMENTATION ONLY (OUTPATIENT)
Dept: NEUROLOGY | Facility: CLINIC | Age: 23
End: 2023-06-19
Payer: COMMERCIAL

## 2023-06-19 NOTE — PROGRESS NOTES
Plan of treatment received from [facility], placed in Dr. Wilson's folder for review and signature.   Brennan Aguirre EMT 06/19/2023 8:29AM

## 2023-06-19 NOTE — PROGRESS NOTES
Approval for Tysabri has been received from [facility], valid from 06/02/2023 through 06/01/2024   Brennan Aguirre EMT 11/06/2023 11:06AM

## 2023-06-22 ENCOUNTER — TRANSFERRED RECORDS (OUTPATIENT)
Dept: HEALTH INFORMATION MANAGEMENT | Facility: CLINIC | Age: 23
End: 2023-06-22
Payer: COMMERCIAL

## 2023-06-22 NOTE — PROGRESS NOTES
Plan of treatment has been signed and faxed back at [phone]   Brennan AQUINO 06/22/2023 1:58PM

## 2023-06-27 ENCOUNTER — TRANSFERRED RECORDS (OUTPATIENT)
Dept: NEUROLOGY | Facility: CLINIC | Age: 23
End: 2023-06-27
Payer: COMMERCIAL

## 2023-07-29 ENCOUNTER — HEALTH MAINTENANCE LETTER (OUTPATIENT)
Age: 23
End: 2023-07-29

## 2023-08-18 ENCOUNTER — MEDICAL CORRESPONDENCE (OUTPATIENT)
Dept: HEALTH INFORMATION MANAGEMENT | Facility: CLINIC | Age: 23
End: 2023-08-18

## 2023-08-18 ENCOUNTER — ANCILLARY PROCEDURE (OUTPATIENT)
Dept: NEUROLOGY | Facility: CLINIC | Age: 23
End: 2023-08-18
Attending: PSYCHIATRY & NEUROLOGY

## 2023-08-18 DIAGNOSIS — R55 SPELL OF LOSS OF CONSCIOUSNESS: ICD-10-CM

## 2023-08-21 ENCOUNTER — TRANSCRIBE ORDERS (OUTPATIENT)
Dept: OTHER | Age: 23
End: 2023-08-21

## 2023-08-21 DIAGNOSIS — R42 POSITIONAL LIGHTHEADEDNESS: ICD-10-CM

## 2023-08-21 DIAGNOSIS — I47.11 INAPPROPRIATE SINUS TACHYCARDIA (H): Primary | ICD-10-CM

## 2023-08-23 ENCOUNTER — DOCUMENTATION ONLY (OUTPATIENT)
Dept: NEUROLOGY | Facility: CLINIC | Age: 23
End: 2023-08-23
Payer: COMMERCIAL

## 2023-08-23 NOTE — TELEPHONE ENCOUNTER
RECORDS RECEIVED FROM:    DATE RECEIVED:    NOTES STATUS DETAILS   OFFICE NOTE from referring provider  Care Everywhere Dr. England 8-18-23   OFFICE NOTE from other cardiologists  Care Everywhere Dr. Huffman 7-13-23   RECORDS from hospital/ED Care Everywhere 4-5-23 [facility]   MEDICATION LIST Internal    GENERAL CARDIO RECORDS   (ALL APPOINTMENT TYPES)     LABS (CBC,BMP,CMP, TSH) Internal    EKG (STRIPS & REPORTS) In process 5-1-23 Requested    MONITORS (STRIPS & REPORTS) Care Everywhere 3-24-23 in [facility]   ECHOS (IMAGES AND REPORTS) In process 7-27-23 Requested   STRESS TESTS (IMAGES AND REPORTS) N/A    cMRI (IMAGES AND REPORTS) N/A    CT/CTA (IMAGES AND REPORTS) N/A      Action 8/23/23 [facility]  Fax #[phone]   Action Taken Requested echo 7-27-23    Sent second request for echo 9/7    Sent third request 10/6    Resolved echo in PACS 10/10       Action 8/23/23 [facility]  Fax #[phone]   Action Taken Requested EKGs 5-1-23, 4-28-23, 4-5-23    Sent EKGs to scanning 9/7

## 2023-08-23 NOTE — PROGRESS NOTES
Approval for VEEG and EEG received form [employer] and [employer], valid from 08/08/2023 through 10/08/2023.  Brennan Aguirre EMT 08/22/2023 2:58PM

## 2023-09-25 ENCOUNTER — DOCUMENTATION ONLY (OUTPATIENT)
Dept: NEUROLOGY | Facility: CLINIC | Age: 23
End: 2023-09-25
Payer: COMMERCIAL

## 2023-10-12 ENCOUNTER — LAB (OUTPATIENT)
Dept: LAB | Facility: CLINIC | Age: 23
End: 2023-10-12
Payer: COMMERCIAL

## 2023-10-12 ENCOUNTER — OFFICE VISIT (OUTPATIENT)
Dept: NEUROLOGY | Facility: CLINIC | Age: 23
End: 2023-10-12
Attending: PSYCHIATRY & NEUROLOGY
Payer: COMMERCIAL

## 2023-10-12 VITALS — HEART RATE: 62 BPM | DIASTOLIC BLOOD PRESSURE: 72 MMHG | OXYGEN SATURATION: 100 % | SYSTOLIC BLOOD PRESSURE: 110 MMHG

## 2023-10-12 VITALS
WEIGHT: 173.6 LBS | SYSTOLIC BLOOD PRESSURE: 109 MMHG | TEMPERATURE: 97.2 F | HEIGHT: 66 IN | BODY MASS INDEX: 27.9 KG/M2 | DIASTOLIC BLOOD PRESSURE: 73 MMHG | HEART RATE: 65 BPM

## 2023-10-12 DIAGNOSIS — R25.1 TREMOR: ICD-10-CM

## 2023-10-12 DIAGNOSIS — G35 MS (MULTIPLE SCLEROSIS) (H): ICD-10-CM

## 2023-10-12 DIAGNOSIS — R53.82 CHRONIC FATIGUE: ICD-10-CM

## 2023-10-12 DIAGNOSIS — R55 SPELL OF LOSS OF CONSCIOUSNESS: ICD-10-CM

## 2023-10-12 DIAGNOSIS — R40.4 SPELL OF ALTERED CONSCIOUSNESS: ICD-10-CM

## 2023-10-12 DIAGNOSIS — G35 MS (MULTIPLE SCLEROSIS) (H): Primary | ICD-10-CM

## 2023-10-12 DIAGNOSIS — M79.2 NEUROPATHIC PAIN: ICD-10-CM

## 2023-10-12 LAB
ACANTHOCYTES BLD QL SMEAR: NORMAL
ALBUMIN SERPL BCG-MCNC: 4.2 G/DL (ref 3.5–5.2)
ALP SERPL-CCNC: 106 U/L (ref 35–104)
ALT SERPL W P-5'-P-CCNC: 9 U/L (ref 0–50)
AST SERPL W P-5'-P-CCNC: 22 U/L (ref 0–45)
AUER BODIES BLD QL SMEAR: NORMAL
BASO STIPL BLD QL SMEAR: NORMAL
BASO+EOS+MONOS # BLD AUTO: ABNORMAL 10*3/UL
BASO+EOS+MONOS NFR BLD AUTO: ABNORMAL %
BASOPHILS # BLD AUTO: 0 10E3/UL (ref 0–0.2)
BASOPHILS NFR BLD AUTO: 0 %
BILIRUB DIRECT SERPL-MCNC: <0.2 MG/DL (ref 0–0.3)
BILIRUB SERPL-MCNC: 0.4 MG/DL
BITE CELLS BLD QL SMEAR: NORMAL
BLISTER CELLS BLD QL SMEAR: NORMAL
BURR CELLS BLD QL SMEAR: NORMAL
DACRYOCYTES BLD QL SMEAR: NORMAL
ELLIPTOCYTES BLD QL SMEAR: NORMAL
EOSINOPHIL # BLD AUTO: 0.2 10E3/UL (ref 0–0.7)
EOSINOPHIL NFR BLD AUTO: 2 %
ERYTHROCYTE [DISTWIDTH] IN BLOOD BY AUTOMATED COUNT: 14.4 % (ref 10–15)
FRAGMENTS BLD QL SMEAR: NORMAL
HCT VFR BLD AUTO: 38.7 % (ref 35–47)
HGB BLD-MCNC: 12.8 G/DL (ref 11.7–15.7)
HGB C CRYSTALS: NORMAL
HOWELL-JOLLY BOD BLD QL SMEAR: NORMAL
IMM GRANULOCYTES # BLD: 0 10E3/UL
IMM GRANULOCYTES NFR BLD: 0 %
LYMPHOCYTES # BLD AUTO: 5 10E3/UL (ref 0.8–5.3)
LYMPHOCYTES NFR BLD AUTO: 60 %
MCH RBC QN AUTO: 28.5 PG (ref 26.5–33)
MCHC RBC AUTO-ENTMCNC: 33.1 G/DL (ref 31.5–36.5)
MCV RBC AUTO: 86 FL (ref 78–100)
MONOCYTES # BLD AUTO: 0.5 10E3/UL (ref 0–1.3)
MONOCYTES NFR BLD AUTO: 6 %
NEUTROPHILS # BLD AUTO: 2.7 10E3/UL (ref 1.6–8.3)
NEUTROPHILS NFR BLD AUTO: 32 %
NEUTS HYPERSEG BLD QL SMEAR: NORMAL
NRBC # BLD AUTO: 0 10E3/UL
NRBC BLD AUTO-RTO: 1 /100
PLAT MORPH BLD: NORMAL
PLATELET # BLD AUTO: 325 10E3/UL (ref 150–450)
POLYCHROMASIA BLD QL SMEAR: NORMAL
PROT SERPL-MCNC: 6.7 G/DL (ref 6.4–8.3)
RBC # BLD AUTO: 4.49 10E6/UL (ref 3.8–5.2)
RBC AGGLUT BLD QL: NORMAL
RBC MORPH BLD: NORMAL
ROULEAUX BLD QL SMEAR: NORMAL
SICKLE CELLS BLD QL SMEAR: NORMAL
SMUDGE CELLS BLD QL SMEAR: NORMAL
SPHEROCYTES BLD QL SMEAR: NORMAL
STOMATOCYTES BLD QL SMEAR: NORMAL
TARGETS BLD QL SMEAR: NORMAL
TOXIC GRANULES BLD QL SMEAR: NORMAL
VARIANT LYMPHS BLD QL SMEAR: NORMAL
WBC # BLD AUTO: 8.5 10E3/UL (ref 4–11)

## 2023-10-12 PROCEDURE — 36415 COLL VENOUS BLD VENIPUNCTURE: CPT | Performed by: PATHOLOGY

## 2023-10-12 PROCEDURE — 99214 OFFICE O/P EST MOD 30 MIN: CPT | Performed by: PSYCHIATRY & NEUROLOGY

## 2023-10-12 PROCEDURE — G0463 HOSPITAL OUTPT CLINIC VISIT: HCPCS | Performed by: PSYCHIATRY & NEUROLOGY

## 2023-10-12 PROCEDURE — 85025 COMPLETE CBC W/AUTO DIFF WBC: CPT | Performed by: PATHOLOGY

## 2023-10-12 PROCEDURE — 80076 HEPATIC FUNCTION PANEL: CPT | Performed by: PSYCHIATRY & NEUROLOGY

## 2023-10-12 PROCEDURE — 99000 SPECIMEN HANDLING OFFICE-LAB: CPT | Performed by: PATHOLOGY

## 2023-10-12 RX ORDER — CLONAZEPAM 0.5 MG/1
TABLET ORAL
Qty: 60 TABLET | Refills: 5 | Status: SHIPPED | OUTPATIENT
Start: 2023-10-12 | End: 2024-05-09

## 2023-10-12 ASSESSMENT — PAIN SCALES - GENERAL: PAINLEVEL: NO PAIN (0)

## 2023-10-12 NOTE — NURSING NOTE
Chief Complaint   Patient presents with    RECHECK     6 month follow up   /72   Pulse 62   SpO2 100%     Melissa Pinon CMA at 4:44 PM on 10/12/2023.         Simple: Patient demonstrates quick and easy understanding

## 2023-10-12 NOTE — PROGRESS NOTES
"October 12, 2023    CHIEF COMPLAINT:  Seizures.    HISTORY OF PRESENT ILLNESS:  This patient is a 23 year old years old right handed female presented for evaluation for seizure activities.  Patient is by herself in the clinic today.  She was referred for a second opinion for her seizures.  She was diagnosed with both focal seizures and non-epileptic seizures at [place] in the past.    She had a history of MS and followed by Dr. Wilson at [facility].  Patient started to have seizure like activities at 17 years old.  First happened in 2018.  She was not seen for this. She was not diagnosed with seizures until April 2023. This happened when she was doing infusion for MS. She was having frequent seizures in April and May  and they decreased in frequency for the past 2-3 months. She was having multiple seizures per week in April, and now down to about twice a month. Her last seizure like activities around Sep. 10th 2023.    Patient has one type of seizures. They are described as starting with a tingling feeling in her body and arms, hands. Then she will feel \"spacing out\", then she will lose awareness for a few seconds, no longer than one minute. This is followed by post-ictal fatigue, aching, and confusion.    She had VEEG monitoring at [facility] in April and recorded multiple seizure like activities.  They were concluded as non-epileptic events.  However, one of the EEG showed \"focal rhythmic slow wave activity in the right temporal region.  This could represent focal seizure activity particularly from a deep source on the right side\".    TRIGGERS FOR SEIZURES:    None    RISK FACTORS FOR SEIZURES:  No history of head trauma with loss of consciousness, no history of CNS infection, no history of febrile convulsions.  No history of brain tumor or stroke. Normal development.     ALLERGIES:  Patient has no known allergies.    CURRENT MEDICATIONS:  Current Outpatient Medications   Medication Sig Dispense Refill " "   clonazePAM (KLONOPIN) 0.5 MG tablet TAKE 1 TO 2 TABLETS BY MOUTH AT NIGHT AS NEEDED FOR TREMORS 60 tablet 5    ferrous sulfate (FEROSUL) 325 (65 Fe) MG tablet Take 325 mg by mouth      gabapentin (NEURONTIN) 300 MG capsule TAKE UP TO 2 CAPSULES BY MOUTH THREE TIMES DAILY 180 capsule 11    galcanezumab-gnlm (EMGALITY) 120 MG/ML injection inject 120 mg subcutaneous every 28 days 1 mL 12    levonorgestrel-ethinyl estradiol (AVIANE) 0.1-20 MG-MCG tablet Take 1 tablet by mouth daily      modafinil (PROVIGIL) 200 MG tablet TAKE 1/2 (ONE-HALF) TABLET BY MOUTH TWICE DAILY AS NEEDED FOR  FATIGUE  IN  THE  MORNING  AND  EARLY  AFTERNOON 30 tablet 5    natalizumab (TYSABRI) 300 MG/15ML injection Inject 300 mg into the vein once every six weeks      order for DME Equipment being ordered: Wheelchair (manual) 1 Device 0    prochlorperazine (COMPAZINE) 5 MG tablet Take 1-2 tablets (5-10 mg) by mouth every 6 hours as needed for nausea or vomiting 20 tablet 3    SUMAtriptan (IMITREX) 50 MG tablet Take 1-2 tablets ( mg) by mouth at onset of headache for migraine May repeat in 2 hours. Max 4 tablets/24 hours. 12 tablet 9    vitamin D3 (CHOLECALCIFEROL) 50 mcg (2000 units) tablet Take 1 tablet (50 mcg) by mouth daily 90 tablet 3       PAST AEDs:  Keppra was given in the hospital, did not continue.    PAST MEDICAL HISTORY:  Past Medical History:   Diagnosis Date    Multiple sclerosis (H) 09/2017     Depression  Anxiety    PAST SURGICAL HISTORY:  Tonsilectomy     Family History:  History of seizures: None    SOCIAL HISTORY:  Born and raised: [place].   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 She wants to be a PA.  Tobacco: no, ETOH: no, Drugs: no    Family: not , Children: no    REVIEW OF SYSTEMS: Negative       PHYSICAL EXAMINATIONS:       Blood pressure 109/73, pulse 65, temperature 97.2  F (36.2  C), temperature source Temporal, height 1.676 m (5' 6\"), weight 78.7 " kg (173 lb 9.6 oz), not currently breastfeeding.    General exam: General Appearance:  No acute distress.  HEENT:  Normocephalic, atraumatic.  Neck:  Supple, no lymphadenopathy. Extremities:  No edema.      Neurologic Exam:  Alert and oriented x3.  Speech fluent, appropriate. Normal attention.  Cranial Nerves:  Pupils are equal, round, reactive to light and accomodation.  Extraocular movement intact.  No facial weakness or asymmetry.  Facial sensation was normal.  Tongue and palate midline.  Hearing normal.  Visual field : normal to confrontation.   Motor Exam:  Normal bulk.  Normal tone.  Strength 5/5 in all extremities.  Sensory:  Normal to light touch and vibration in all extremities.  Deep tendon reflexes 2+ bilaterally in both upper and lower extremities.  Coordination:  Finger-to-nose, heel-to-shin exams showed no ataxia.  Rapid alternating movement was normal.  Gait and Station:  normal casual gait.  Normal tandem gait. No difficulties with tip-toe or heel walking.  Romberg sign negative.      PREVIOUS DIAGNOSTIC TESTING:    MRI brain: (4/30/2023):    IMPRESSION:   1. Mild interval improvement in the areas of demyelination particularly   within   the right middle cerebellar peduncle.  No evidence of active   demyelination.   2. No acute intracranial hemorrhage or evidence of acute ischemia.     EEG: (8/18/2023)    Normal.       IMPRESSION:  This patient is a 23 year old years old right handed female presented with seizure like activities since April 2023. Her seizure like activities are much less frequent for the past few month. Her last spell was over one month ago.    Based on her history and description of her seizure-like activities, her seizures are most likely nonepileptic by nature.  Since her spells are much improved in frequency, we will not pursue any more for further work-up at this time, no antiseizure medications are needed.  We will follow-up in 3 months for further evaluation.    PLAN:  No  further work is needed at this time.  No AEDs needed.  RTC in 3 months.        Ben Beyer MD      70 min total time was spent on the day of this visit.      45 min was spent on face to face time  25 min was spent on preparation of visit to review charts and labs, ordering medications and tests, and documentation of clinical information

## 2023-10-12 NOTE — Clinical Note
"10/12/2023       RE: Cindi Ferrer  [address]  [place] [address]     Dear Colleague,    Thank you for referring your patient, Cindi Ferrer, to the [facility] MULTIPLE SCLEROSIS CLINIC [place] at [facility]. Please see a copy of my visit note below.    Referral source: Established patient    Chief complaint: Multiple sclerosis    History of the Present Illness: Ms. Cindi Ferrer is a 23 year old woman who returns to the Multiple Sclerosis Clinic today for a scheduled follow up visit regarding her diagnosis of multiple sclerosis.    The patient's history is as per my previous notes.  She initially developed symptoms of demyelinating disease in 2017 when she had horizontal diplopia and left hemibody numbness.  She was admitted to an outside pediatric facility at that time.  MRI scans demonstrated T2 hyperintense lesions suggestive of demyelinating disease of the type seen in multiple sclerosis, and a CSF examination was also positive for oligoclonal bands. She started disease-modifying therapy with natalizumab, and has remained on that treatment to date, with no definite MS relapses since beginning this treatment.    Today, she continues to deny any new episodic changes in vision, balance, strength or sensation suggestive of new relapse of multiple sclerosis since her last visit to the clinic.  She describes her overall status as \"pretty good, pretty stable\".    At her most recent visit on 4/13/2023, we discussed episodes of loss of consciousness.  I had previously felt that these episodes were most suggestive of neurocardiogenic syncope by description, but in April they described episodes that would be more consistent with psychogenic nonepileptic spells versus seizure activity.    I had ordered a 3-hour video EEG to be performed on an outpatient basis.  However, before this was completed, the patient was admitted to [facility] from April 28 to " May 2, 2023 and underwent prolonged video EEG monitoring during the hospitalization.  Events were captured and felt consistent with nonepileptic events.  An MRI scan of the brain was also performed during that hospitalization did not demonstrate any evidence of active or interval demyelination.    She did subsequently complete a 3-hour wake and sleep EEG here as well on 8/18/2023, which was a normal study with no epileptiform abnormality evident.    She has also undergone cardiac monitoring demonstrating intermittent tachycardia, and is due to be evaluated by our colleagues in cardiology in the near future.    Regarding other symptoms, she remains on gabapentin 600 mg in the morning and afternoon and 900 mg the evening for neuropathic pain, which remains well-controlled.    She takes modafinil 100 mg in the morning and early afternoon as needed for fatigue and relates that the medicine continues to be effective.  She has not noted any correlation between taking modafinil and her episodes of tachycardia.    For tremor, she will take clonazepam 0.5 to 1 mg daily as needed.  She has noted that tremor seems to be more frequent around the time of her menses.  Currently she is taking clonazepam 2-3 times per week.    PHYSICAL EXAMINATION:  VITAL SIGNS: Blood pressure 110/72; pulse 62; oxygen saturation 100%.  GENERAL: Well-nourished young adult woman who presents to the evauation alone, awake and alert and in no acute distress.    Assessment/plan:    1. Multiple sclerosis  The patient remains clinically stable with no evidence of active inflammatory demyelination on current disease modifying therapy with natalizumab, which she will continue.    Today, I will check routine laboratory studies for monitoring of this medication to include complete blood counts with differential, hepatic panel, and ADRIANA virus serology.    I advised her to start taking 4000 international units of vitamin D daily on an indefinite basis to try to  bring her level up to our goal range of 60-80 mcg/L (from 34 on 4/13/2023).    I will see her back in 6 months for a review.    2. Spells of loss of consciousness  The events described during her hospitalization in [place] this spring are most consistent with psychogenic non-epileptic spells. It is possible that she may have superimposed episodes of vasovagal/neurocardiogenic syncope, as well. I do not think that she has an epileptic condition, and anticonvulsant medications are not indicated.    Fortunately, she has not had any such spells over the past month, and it is encouraging that these are decreasing in frequency.  She will follow-up with cardiology, as scheduled.    3. Neuropathic pain  She will continue gabapentin at the current dose.    4. Chronic fatigue  She has been taking modafinil intermittently for a number of years, and this predated the onset of her spells of loss of consciousness.  There is no clear association between taking the medicine and heart palpitations/tachycardia that she has noticed.  I think it is fine for her to continue this medication for now.    5. Tremor  She will continue use of clonazepam on an as-needed basis.      Again, thank you for allowing me to participate in the care of your patient.      Sincerely,    Coy Wilson MD

## 2023-10-12 NOTE — PATIENT INSTRUCTIONS
Recommend that you take 4000 units of vitamin D daily    2.   Continue Tysabri infusions    3.   Continue modafinil, gabapentin, and clonazepam as you are doing    4.   Blood tests today    5.   Return to clinic in 6 months

## 2023-10-12 NOTE — LETTER
"10/12/2023       RE: Cindi Ferrer  : 2000   MRN: [id number]      Dear Colleague,    Thank you for referring your patient, Cindi Ferrer, to the [facility] EPILEPSY CARE at [facility]. Please see a copy of my visit note below.    2023    CHIEF COMPLAINT:  Seizures.    HISTORY OF PRESENT ILLNESS:  This patient is a 23 year old years old right handed female presented for evaluation for seizure activities.  Patient is by herself in the clinic today.  She was referred for a second opinion for her seizures.  She was diagnosed with both focal seizures and non-epileptic seizures at [facility] in the past.    She had a history of MS and followed by Dr. Wilson at [facility].  Patient started to have seizure like activities at 17 years old.  First happened in .  She was not seen for this. She was not diagnosed with seizures until 2023. This happened when she was doing infusion for MS. She was having frequent seizures in April and May  and they decreased in frequency for the past 2-3 months. She was having multiple seizures per week in April, and now down to about twice a month. Her last seizure like activities around Sep. 10th 2023.    Patient has one type of seizures. They are described as starting with a tingling feeling in her body and arms, hands. Then she will feel \"spacing out\", then she will lose awareness for a few seconds, no longer than one minute. This is followed by post-ictal fatigue, aching, and confusion.    She had VEEG monitoring at [facility] in April and recorded multiple seizure like activities.  They were concluded as non-epileptic events.  However, one of the EEG showed \"focal rhythmic slow wave activity in the right temporal region.  This could represent focal seizure activity particularly from a deep source on the right side\".    TRIGGERS FOR SEIZURES:    None    RISK FACTORS FOR SEIZURES:  No history of head " trauma with loss of consciousness, no history of CNS infection, no history of febrile convulsions.  No history of brain tumor or stroke. Normal development.     ALLERGIES:  Patient has no known allergies.    CURRENT MEDICATIONS:  Current Outpatient Medications   Medication Sig Dispense Refill    clonazePAM (KLONOPIN) 0.5 MG tablet TAKE 1 TO 2 TABLETS BY MOUTH AT NIGHT AS NEEDED FOR TREMORS 60 tablet 5    ferrous sulfate (FEROSUL) 325 (65 Fe) MG tablet Take 325 mg by mouth      gabapentin (NEURONTIN) 300 MG capsule TAKE UP TO 2 CAPSULES BY MOUTH THREE TIMES DAILY 180 capsule 11    galcanezumab-gnlm (EMGALITY) 120 MG/ML injection inject 120 mg subcutaneous every 28 days 1 mL 12    levonorgestrel-ethinyl estradiol (AVIANE) 0.1-20 MG-MCG tablet Take 1 tablet by mouth daily      modafinil (PROVIGIL) 200 MG tablet TAKE 1/2 (ONE-HALF) TABLET BY MOUTH TWICE DAILY AS NEEDED FOR  FATIGUE  IN  THE  MORNING  AND  EARLY  AFTERNOON 30 tablet 5    natalizumab (TYSABRI) 300 MG/15ML injection Inject 300 mg into the vein once every six weeks      order for DME Equipment being ordered: Wheelchair (manual) 1 Device 0    prochlorperazine (COMPAZINE) 5 MG tablet Take 1-2 tablets (5-10 mg) by mouth every 6 hours as needed for nausea or vomiting 20 tablet 3    SUMAtriptan (IMITREX) 50 MG tablet Take 1-2 tablets ( mg) by mouth at onset of headache for migraine May repeat in 2 hours. Max 4 tablets/24 hours. 12 tablet 9    vitamin D3 (CHOLECALCIFEROL) 50 mcg (2000 units) tablet Take 1 tablet (50 mcg) by mouth daily 90 tablet 3       PAST AEDs:  Keppra was given in the hospital, did not continue.    PAST MEDICAL HISTORY:  Past Medical History:   Diagnosis Date    Multiple sclerosis (H) 09/2017     Depression  Anxiety    PAST SURGICAL HISTORY:  Tonsilectomy     Family History:  History of seizures: None    SOCIAL HISTORY:  Born and raised: [facility].   Education: She had regular classes, now in [employer]  "science. She wants to be a PA.  Tobacco: no, ETOH: no, Drugs: no    Family: not , Children: no    REVIEW OF SYSTEMS: Negative       PHYSICAL EXAMINATIONS:       Blood pressure 109/73, pulse 65, temperature 97.2  F (36.2  C), temperature source Temporal, height 1.676 m (5' 6\"), weight 78.7 kg (173 lb 9.6 oz), not currently breastfeeding.    General exam: General Appearance:  No acute distress.  HEENT:  Normocephalic, atraumatic.  Neck:  Supple, no lymphadenopathy. Extremities:  No edema.      Neurologic Exam:  Alert and oriented x3.  Speech fluent, appropriate. Normal attention.  Cranial Nerves:  Pupils are equal, round, reactive to light and accomodation.  Extraocular movement intact.  No facial weakness or asymmetry.  Facial sensation was normal.  Tongue and palate midline.  Hearing normal.  Visual field : normal to confrontation.   Motor Exam:  Normal bulk.  Normal tone.  Strength 5/5 in all extremities.  Sensory:  Normal to light touch and vibration in all extremities.  Deep tendon reflexes 2+ bilaterally in both upper and lower extremities.  Coordination:  Finger-to-nose, heel-to-shin exams showed no ataxia.  Rapid alternating movement was normal.  Gait and Station:  normal casual gait.  Normal tandem gait. No difficulties with tip-toe or heel walking.  Romberg sign negative.      PREVIOUS DIAGNOSTIC TESTING:    MRI brain: (4/30/2023):    IMPRESSION:   1. Mild interval improvement in the areas of demyelination particularly   within   the right middle cerebellar peduncle.  No evidence of active   demyelination.   2. No acute intracranial hemorrhage or evidence of acute ischemia.     EEG: (8/18/2023)    Normal.       IMPRESSION:  This patient is a 23 year old years old right handed female presented with seizure like activities since April 2023. Her seizure like activities are much less frequent for the past few month. Her last spell was over one month ago.    Based on her history and description of her " seizure-like activities, her seizures are most likely nonepileptic by nature.  Since her spells are much improved in frequency, we will not pursue any more for further work-up at this time, no antiseizure medications are needed.  We will follow-up in 3 months for further evaluation.    PLAN:  No further work is needed at this time.  No AEDs needed.  RTC in 3 months.        70 min total time was spent on the day of this visit.      45 min was spent on face to face time  25 min was spent on preparation of visit to review charts and labs, ordering medications and tests, and documentation of clinical information      Again, thank you for allowing me to participate in the care of your patient.      Sincerely,    Ben Beyer MD

## 2023-10-12 NOTE — LETTER
"10/12/2023      RE: Cindi Ferrer  [address]  [place] [address]     Referral source: Established patient    Chief complaint: Multiple sclerosis    History of the Present Illness: Ms. Cindi Ferrer is a 23 year old woman who returns to the Multiple Sclerosis Clinic today for a scheduled follow up visit regarding her diagnosis of multiple sclerosis.    The patient's history is as per my previous notes.  She initially developed symptoms of demyelinating disease in 2017 when she had horizontal diplopia and left hemibody numbness.  She was admitted to an outside pediatric facility at that time.  MRI scans demonstrated T2 hyperintense lesions suggestive of demyelinating disease of the type seen in multiple sclerosis, and a CSF examination was also positive for oligoclonal bands. She started disease-modifying therapy with natalizumab, and has remained on that treatment to date, with no definite MS relapses since beginning this treatment.    Today, she continues to deny any new episodic changes in vision, balance, strength or sensation suggestive of new relapse of multiple sclerosis since her last visit to the clinic.  She describes her overall status as \"pretty good, pretty stable\".    At her most recent visit on 4/13/2023, we discussed episodes of loss of consciousness.  I had previously felt that these episodes were most suggestive of neurocardiogenic syncope by description, but in April they described episodes that would be more consistent with psychogenic nonepileptic spells versus seizure activity.    I had ordered a 3-hour video EEG to be performed on an outpatient basis.  However, before this was completed, the patient was admitted to [facility] from April 28 to May 2, 2023 and underwent prolonged video EEG monitoring during the hospitalization.  Events were captured and felt consistent with nonepileptic events.  An MRI scan of the brain was also performed during that hospitalization did not " demonstrate any evidence of active or interval demyelination.    She did subsequently complete a 3-hour wake and sleep EEG here as well on 8/18/2023, which was a normal study with no epileptiform abnormality evident.    She has also undergone cardiac monitoring demonstrating intermittent tachycardia, and is due to be evaluated by our colleagues in cardiology in the near future.    Regarding other symptoms, she remains on gabapentin 600 mg in the morning and afternoon and 900 mg the evening for neuropathic pain, which remains well-controlled.    She takes modafinil 100 mg in the morning and early afternoon as needed for fatigue and relates that the medicine continues to be effective.  She has not noted any correlation between taking modafinil and her episodes of tachycardia.    For tremor, she will take clonazepam 0.5 to 1 mg daily as needed.  She has noted that tremor seems to be more frequent around the time of her menses.  Currently she is taking clonazepam 2-3 times per week.    PHYSICAL EXAMINATION:  VITAL SIGNS: Blood pressure 110/72; pulse 62; oxygen saturation 100%.  GENERAL: Well-nourished young adult woman who presents to the evauation alone, awake and alert and in no acute distress.    Assessment/plan:    1. Multiple sclerosis  The patient remains clinically stable with no evidence of active inflammatory demyelination on current disease modifying therapy with natalizumab, which she will continue.    Today, I will check routine laboratory studies for monitoring of this medication to include complete blood counts with differential, hepatic panel, and ADRIANA virus serology.    I advised her to start taking 4000 international units of vitamin D daily on an indefinite basis to try to bring her level up to our goal range of 60-80 mcg/L (from 34 on 4/13/2023).    I will see her back in 6 months for a review.    2. Spells of loss of consciousness  The events described during her hospitalization in [place] this  spring are most consistent with psychogenic non-epileptic spells. It is possible that she may have superimposed episodes of vasovagal/neurocardiogenic syncope, as well. I do not think that she has an epileptic condition, and anticonvulsant medications are not indicated.    Fortunately, she has not had any such spells over the past month, and it is encouraging that these are decreasing in frequency.  She will follow-up with cardiology, as scheduled.    3. Neuropathic pain  She will continue gabapentin at the current dose.    4. Chronic fatigue  She has been taking modafinil intermittently for a number of years, and this predated the onset of her spells of loss of consciousness.  There is no clear association between taking the medicine and heart palpitations/tachycardia that she has noticed.  I think it is fine for her to continue this medication for now.              5. Tremor  She will continue use of clonazepam on an as-needed basis.    Coy Wilson MD   of Neurology  [facility]    Cc:  Viviane England MD (PCP)  Patient   no

## 2023-10-16 ENCOUNTER — PRE VISIT (OUTPATIENT)
Dept: CARDIOLOGY | Facility: CLINIC | Age: 23
End: 2023-10-16
Payer: COMMERCIAL

## 2023-10-16 ENCOUNTER — OFFICE VISIT (OUTPATIENT)
Dept: CARDIOLOGY | Facility: CLINIC | Age: 23
End: 2023-10-16
Attending: INTERNAL MEDICINE
Payer: COMMERCIAL

## 2023-10-16 VITALS — BODY MASS INDEX: 27.31 KG/M2 | WEIGHT: 174 LBS | HEIGHT: 67 IN | OXYGEN SATURATION: 100 %

## 2023-10-16 DIAGNOSIS — I47.11 INAPPROPRIATE SINUS TACHYCARDIA (H): ICD-10-CM

## 2023-10-16 DIAGNOSIS — R42 POSITIONAL LIGHTHEADEDNESS: Primary | ICD-10-CM

## 2023-10-16 DIAGNOSIS — G90.A POTS (POSTURAL ORTHOSTATIC TACHYCARDIA SYNDROME): ICD-10-CM

## 2023-10-16 PROCEDURE — G0463 HOSPITAL OUTPT CLINIC VISIT: HCPCS | Performed by: INTERNAL MEDICINE

## 2023-10-16 PROCEDURE — 93010 ELECTROCARDIOGRAM REPORT: CPT | Performed by: INTERNAL MEDICINE

## 2023-10-16 PROCEDURE — 93005 ELECTROCARDIOGRAM TRACING: CPT

## 2023-10-16 PROCEDURE — 99204 OFFICE O/P NEW MOD 45 MIN: CPT | Performed by: INTERNAL MEDICINE

## 2023-10-16 ASSESSMENT — PAIN SCALES - GENERAL: PAINLEVEL: MODERATE PAIN (4)

## 2023-10-16 NOTE — PATIENT INSTRUCTIONS
October 16, 2023    Cardiology Provider You Saw During Your Visit: Dr Chiu       Medication Changes: None  -Complete tilt table test. Scheduling will call you to set that up.           Follow Up: To be determined       Follow the American Heart Association Diet and Lifestyle Recommendations:  -Limit saturated fat, trans fat, sodium, red meat, sweets and sugar-sweetened beverages. If you choose to eat red meat, compare labels and select the leanest cuts available.  -Aim for at least 150 minutes of moderate physical activity or 75 minutes of vigorous physical activity - or an equal combination of both - each week.      To Reach Us:  -During business hours: [phone], press option # 1 to schedule an appointment or to leave a message for your care team.     -After hours, weekends or holidays: [phone], press option #4 and ask to speak to the on-call cardiologist. Inform them you are a patient at the [facility].        **If you have a cardiac device, please make sure you schedule an in-person device check just prior to your cardiology provider appointments**        Paulina Webster RN  Cardiology Care Coordinator - General Cardiology  [facility]

## 2023-10-16 NOTE — LETTER
October 17, 2023      TO: Cindi Ferrer  [address] [place] [address]         Dear Asuncion Puente Table/Autonomic Study    Visitor Policy: Two visitors.    Below are instructions, if you have questions please contact our office.     1. You should arrive at the [facility] at _______  ([address], [place]: [phone]).    2. Report to the GOLD waiting room. Your procedure will be done in the Catheterization Lab.     3. Wear comfortable clothing. (sweat/yoga pants, t-shirt). Please allow 3-4 hours for this procedure to be completed.     4. Do not eat or drink ANYTHING for 3 hours prior to arrival.     5. The morning of your procedure, you may take any scheduled medications with a SIP of water- unless you were instructed differently by your physician. Do not take any vitamins, minerals or herbal supplements.     6. You may drive yourself to and from this procedure, although we recommend a  incase you do not feel well afterwards.       If you have further questions, please utilize [employer] to contact us.   If your question concerns the above instructions, contact:  Olga Lidia MELENDEZ RN  Electrophysiology Nurse Coordinator.  [phone]    If your question concerns the schedule/appointment times, contact:  [facility]   [phone]

## 2023-10-16 NOTE — NURSING NOTE
Chief Complaint   Patient presents with    Follow Up     New cardiology. Referred by PCP for inappropriate sinus tachycardia and positional lightheadedness. Possible POTS         Vitals were taken, medications reconciled and EKG performed.    Paige Thurner, Facilitator  3:46 PM

## 2023-10-17 RX ORDER — SODIUM CHLORIDE 9 MG/ML
INJECTION, SOLUTION INTRAVENOUS CONTINUOUS
Status: CANCELLED | OUTPATIENT
Start: 2023-10-17

## 2023-10-17 RX ORDER — LIDOCAINE 40 MG/G
CREAM TOPICAL
Status: CANCELLED | OUTPATIENT
Start: 2023-10-17

## 2023-10-17 NOTE — NURSING NOTE
October 16, 2023    Medication Changes: None       Follow Up: Admit orders placed for tilt table test.       Patient verbalized understanding of all health information given and agreed to call with further questions or concerns.      Talia Junior RN

## 2023-10-18 LAB
ATRIAL RATE - MUSE: 95 BPM
DIASTOLIC BLOOD PRESSURE - MUSE: NORMAL MMHG
INTERPRETATION ECG - MUSE: NORMAL
P AXIS - MUSE: 64 DEGREES
PR INTERVAL - MUSE: 158 MS
QRS DURATION - MUSE: 76 MS
QT - MUSE: 346 MS
QTC - MUSE: 434 MS
R AXIS - MUSE: 45 DEGREES
SYSTOLIC BLOOD PRESSURE - MUSE: NORMAL MMHG
T AXIS - MUSE: 51 DEGREES
VENTRICULAR RATE- MUSE: 95 BPM

## 2023-10-19 ENCOUNTER — DOCUMENTATION ONLY (OUTPATIENT)
Dept: NEUROLOGY | Facility: CLINIC | Age: 23
End: 2023-10-19
Payer: COMMERCIAL

## 2023-10-19 ENCOUNTER — MEDICAL CORRESPONDENCE (OUTPATIENT)
Dept: HEALTH INFORMATION MANAGEMENT | Facility: CLINIC | Age: 23
End: 2023-10-19
Payer: COMMERCIAL

## 2023-10-19 NOTE — PROGRESS NOTES
Physician orders have been signed and faxed back at [phone].  Brennan Aguirre EMT 10/19/2023 2:37PM

## 2023-10-19 NOTE — PROGRESS NOTES
Physician orders received from [facility], forms placed in Dr. Wilson's folder for review and signature.  Brennan Aguirre EMT 10/19/2023 12:22PM

## 2023-10-23 LAB — SCANNED LAB RESULT: ABNORMAL

## 2023-10-24 ENCOUNTER — TELEPHONE (OUTPATIENT)
Dept: NEUROLOGY | Facility: CLINIC | Age: 23
End: 2023-10-24
Payer: COMMERCIAL

## 2023-10-24 NOTE — TELEPHONE ENCOUNTER
Called [facility] pharmacy to check on clonazepam rx, as we had received request for different dose due to clonazepam 0.5 mg being backordered. They have now received additional stock and pt has picked up rx. No further needs.     Jerrica Garner RN

## 2023-11-08 ENCOUNTER — TELEPHONE (OUTPATIENT)
Dept: CARDIOLOGY | Facility: CLINIC | Age: 23
End: 2023-11-08
Payer: COMMERCIAL

## 2023-11-08 NOTE — TELEPHONE ENCOUNTER
Initial call done on 11/2 and a Smart[employer] message sent. Another call done on 11/8 - Dine inhart not read so a letter will be mailed to the home.     EP Scheduling called the patient to schedule Tilt Table Study with Dr. Cooper. The number [phone] was left for the patient to return the call and schedule the procedure.    Karen Murdock  Periop Electrophysiology   [phone]    Karen Murdock  Periop Electrophysiology   [phone]

## 2023-11-08 NOTE — LETTER
November 30, 2023      Cindi Ferrer  [address] [place] [address]        Dear Asuncion Puente Table/Autonomic Study    Visitor Policy: Two visitors.    Below are instructions, if you have questions please contact our office.     1. You should arrive at the [facility] at _______  ([address] [facility], [place]: [phone]).    2. Report to the GOLD waiting room. Your procedure will be done in the Catheterization Lab.     3. Wear comfortable clothing. (sweat/yoga pants, t-shirt). Please allow 3-4 hours for this procedure to be completed.     4. Do not eat or drink ANYTHING for 3 hours prior to arrival.     5. The morning of your procedure, you may take any scheduled medications with a SIP of water- unless you were instructed differently by your physician. Do not take any vitamins, minerals or herbal supplements. HOLD *** Hold midodrine/florinef 2 days prior. Hold ivabradine/stimulants & Beta blockers/heart rate medications day before and morning of.    6. You may drive yourself to and from this procedure, although we recommend a  incase you do not feel well afterwards.       If you have further questions, please utilize [employer] to contact us.   If your question concerns the above instructions, contact:  Olga Lidia MELENDEZ RN  Electrophysiology Nurse Coordinator.  [phone]    If your question concerns the schedule/appointment times, contact:  [facility]   [phone]         Sincerely,        MEHREEN LI

## 2023-11-09 NOTE — PROGRESS NOTES
HPI:     I had the privilege to evaluate and examine Mrs Cindi Ferrer, who is a 23 yr female patient with a Hx of multiple sclerosis who has suddenly syncopes.  There are triggers for these syncopes.  She denies chest pain, shortness of breath.  When she developed the syncope - she takes precautious that she will not fall, feels very tired.    PAST MEDICAL HISTORY:     Seizure (HCC)   Syncope   Anemia   Generalized anxiety disorder   Trauma and stressor-related disorder   Executive function deficit   Adjustment disorder with mixed anxiety and depressed mood   Headache   Multiple sclerosis, relapsing-remitting (HCC)          CURRENT MEDICATIONS:  Current Outpatient Medications   Medication Sig Dispense Refill    clonazePAM (KLONOPIN) 0.5 MG tablet TAKE 1 TO 2 TABLETS BY MOUTH AT NIGHT AS NEEDED FOR TREMORS 60 tablet 5    ferrous sulfate (FEROSUL) 325 (65 Fe) MG tablet Take 325 mg by mouth      gabapentin (NEURONTIN) 300 MG capsule TAKE UP TO 2 CAPSULES BY MOUTH THREE TIMES DAILY 180 capsule 11    galcanezumab-gnlm (EMGALITY) 120 MG/ML injection inject 120 mg subcutaneous every 28 days 1 mL 12    levonorgestrel-ethinyl estradiol (AVIANE) 0.1-20 MG-MCG tablet Take 1 tablet by mouth daily      modafinil (PROVIGIL) 200 MG tablet TAKE 1/2 (ONE-HALF) TABLET BY MOUTH TWICE DAILY AS NEEDED FOR  FATIGUE  IN  THE  MORNING  AND  EARLY  AFTERNOON 30 tablet 5    natalizumab (TYSABRI) 300 MG/15ML injection Inject 300 mg into the vein once every six weeks      order for DME Equipment being ordered: Wheelchair (manual) 1 Device 0    prochlorperazine (COMPAZINE) 5 MG tablet Take 1-2 tablets (5-10 mg) by mouth every 6 hours as needed for nausea or vomiting 20 tablet 3    SUMAtriptan (IMITREX) 50 MG tablet Take 1-2 tablets ( mg) by mouth at onset of headache for migraine May repeat in 2 hours. Max 4 tablets/24 hours. 12 tablet 9    vitamin D3 (CHOLECALCIFEROL) 50 mcg (2000 units) tablet Take 1 tablet (50 mcg) by mouth daily 90  "tablet 3       PAST SURGICAL HISTORY:  No past surgical history on file.    ALLERGIES   No Known Allergies    FAMILY HISTORY:  No family history on file.    SOCIAL HISTORY:  Social History     Socioeconomic History    Marital status: Single     Spouse name: None    Number of children: None    Years of education: None    Highest education level: None   Tobacco Use    Smoking status: Never    Smokeless tobacco: Never   Substance and Sexual Activity    Alcohol use: No    Drug use: No    Sexual activity: Not Currently       ROS:   Constitutional: No fever, chills, or sweats. No weight gain/loss   ENT: No visual disturbance, ear ache, epistaxis, sore throat  Allergies/Immunologic: Negative.   Respiratory: No cough, hemoptysia  Cardiovascular: As per HPI  GI: No nausea, vomiting, hematemesis, melena, or hematochezia  : No urinary frequency, dysuria, or hematuria  Integument: Negative  Psychiatric: Negative  Neuro: Negative  Endocrinology: Negative   Musculoskeletal: Negative    EXAM:  Ht 1.7 m (5' 6.93\")   Wt 78.9 kg (174 lb)   LMP 10/04/2023   SpO2 100%   BMI 27.31 kg/m      Encounter date  Last reading 10/16/23  3:45 PM 10/12/23  4:43 PM 10/12/23  3:12 PM   BP -- 110/72 109/73   Pulse -- 62 65   Height 1.7 m (5' 6.93\") [w shoes] -- 1.676 m (5' 6\")   Weight 78.9 kg (174 lb) [w shoes] -- 78.7 kg (173 lb 9.6 oz)   BMI (Calculated) 27.31 -- 28.02   Orthostatic /80 -- --   Orthostatic Pulse 101           In general, the patient is a pleasant female in no apparent distress.    HEENT: NC/AT.  PERRLA.  EOMI.  Sclerae white, not injected.  Nares clear.  Pharynx without erythema or exudate.  Dentition intact.    Neck: No adenopathy.  No thyromegaly. Carotids +4/4 bilaterally without bruits.  No jugular venous distension.   Heart: RRR. Normal S1, S2 splits physiologically. No murmur, rub, click, or gallop. The PMI is in the 5th ICS in the midclavicular line. There is no heave.    Lungs: CTA.  No ronchi, wheezes, rales. "  No dullness to percussion.   Abdomen: Soft, nontender, nondistended. No organomegaly.  No bruits.   Extremities: No clubbing, cyanosis, or edema.  The pulses are +4/4 at the radial, brachial, femoral, popliteal, DP, and PT sites bilaterally.  No bruits are noted.  Neurologic: Alert and oriented to person/place/time, normal speech, gait and affect  Skin: No petechiae, purpura or rash.    Labs:    Cholesterol  <200 mg/dL 151   Triglycerides  30 - 150 mg/dL 33   Cholesterol, LDL (Calculated)  0 - 159 mg/dL 86   Cholesterol, HDL  >40 mg/dL 58   Cholesterol, vLDL  mg/dL 7       Ref Range & Units 2 mo ago   TSH  0.47 - 5.00 uIU/mL 1.71       LIVER ENZYME RESULTS:  Lab Results   Component Value Date    AST 22 10/12/2023    AST 12 04/08/2021    ALT 9 10/12/2023    ALT 24 04/08/2021       CBC RESULTS:  Lab Results   Component Value Date    WBC 8.5 10/12/2023    WBC 5.7 04/08/2021    RBC 4.49 10/12/2023    RBC 5.20 04/08/2021    HGB 12.8 10/12/2023    HGB 11.7 04/08/2021    HCT 38.7 10/12/2023    HCT 39.0 04/08/2021    MCV 86 10/12/2023    MCV 75 (L) 04/08/2021    MCH 28.5 10/12/2023    MCH 22.5 (L) 04/08/2021    MCHC 33.1 10/12/2023    MCHC 30.0 (L) 04/08/2021    RDW 14.4 10/12/2023    RDW 22.6 (H) 04/08/2021     10/12/2023     04/08/2021       BMP RESULTS:  Lab Results   Component Value Date     10/05/2017    POTASSIUM 3.8 10/05/2017    CHLORIDE 105 10/05/2017    CO2 27 10/05/2017    ANIONGAP 8 10/05/2017    GLC 79 10/05/2017    BUN 6 (L) 10/05/2017    CR 0.54 10/05/2017    GFRESTIMATED >90 10/05/2017    GFRESTBLACK >90 10/05/2017    RUPA 8.7 (L) 10/05/2017            Procedures:    EKG: sin ryhtm, HR 95 bpm    Echocardiogram      * Left ventricle is normal size with normal systolic function, ejection   fraction 55 to 60%.     * Right ventricle is normal hsize with normal systolic function.     * No significant valvular abnormalities seen.     * No pericardial effusion.     * No prior study available for  comparison.   Findings   Left Ventricle     The left ventricle is normal in size. There is no left ventricular   hypertrophy. Left ventricular systolic function is normal. The estimated   ejection fraction is 55-60%. Left ventricular segmental wall motion is   normal.   The left ventricular diastolic function is normal.   Right Ventricle     The right ventricle is normal in size. Normal right ventricular systolic   function.   Left Atrium     The left atrium is normal in size.   Right Atrium     The right atrium is normal in size.   Atrial Septum     There is no evidence of ASD/PFO by color Doppler.   Aortic Valve     The aortic valve is not well visualized. There is no aortic stenosis with    a   peak velocity of  163 cm/s. There is no aortic regurgitation.   Pulmonary Valve     The pulmonic valve is structurally normal. There is no Doppler evidence   of   pulmonic valve sclerosis or stenosis. There is trace pulmonic   regurgitation.   Mitral Valve     The mitral valve leaflets are structurally normal. No mitral annular   calcification. There is no mitral stenosis. There is no mitral   regurgitation.   Tricuspid Valve     The tricuspid valve leaflets are structurally normal. There is no   tricuspid   stenosis. There is no tricuspid regurgitation. Doppler interrogation of the    TV   is inadequate to assess pulmonary artery systolic pressure.   Pericardium / Pleural Effusion     There is no pericardial effusion visualized.   Inferior Vena Cava     The IVC is normal in size (< 2.1 cm), > 50% respiratory variance, RA   pressure normal at 3 mmHg.   Aorta    The aortic root at the sinus of valsalva is normal in size measuring    2.40   cm with an index of  1.24 cm/m2. The proximal ascending aorta is normal in   size measuring  2.20 cm with an index of  1.14 cm/m2.   2D Measurements   ----------------------------------------------------------------------   Name                                Value        Normal    ----------------------------------------------------------------------   Parasternal 2D   ----------------------------------------------------------------------    IVS Diastolic Thickness   (2D)                               0.70 cm     0.60-0.90   LVID Diastole (2D)                 4.10 cm     3.80-5.20    LVIW Diastolic Thickness   (2D)                               0.80 cm     0.60-0.90   LVID Systole (2D)                  2.80 cm     2.20-3.50   LVOT Diameter                      2.00 cm                 LA Dimension (2D)                  3.40 cm     2.70-3.80   Sinus of Valsalva Diameter         2.40 cm     2.70-3.30   Sinus of Valsalva Index         1.24 cm/m2     1.60-2.00   Prox Asc Ao Diameter               2.20 cm     2.30-3.10   LV Ejection Fraction 2D   ----------------------------------------------------------------------    LV Diastolic Volume (BP   MOD)                              73.20 ml  46..00    LV Diastolic Volume Index   (BP MOD)                        37.79 ml/m2   29.00-61.00   LV Systolic Volume (BP MOD)       29.50 ml   14.00-42.00   LV EF (BP MOD)                        60 %         54-74   Apical 2D Dimensions   ----------------------------------------------------------------------   LA Volume Index (BP MOD)        24.27 ml/m2   16.00-34.00   M-mode Measurements   ----------------------------------------------------------------------   Name                                Value        Normal   ----------------------------------------------------------------------   M-Mode   ----------------------------------------------------------------------   TAPSE                             2.76 cm        >=1.70   LVOT/Aortic Valve Doppler Measurements   ----------------------------------------------------------------------   Name                                Value        Normal   ----------------------------------------------------------------------   LVOT Doppler    ----------------------------------------------------------------------   LVOT Peak Velocity              126.00 cm/s                 AoV Doppler   ----------------------------------------------------------------------   AV Peak Velocity                163.0 cm/s                 AV Area (Cont Eq Marcin)             2.43 cm2        >=3.00   AV V1/V2 Ratio                        0.77   Mitral Valve Measurements   ----------------------------------------------------------------------   Name                                Value        Normal   ----------------------------------------------------------------------   MV Doppler   ----------------------------------------------------------------------   MV E Peak Velocity              80.60 cm/s                 MV A Peak Velocity              60.80 cm/s                 MV E/A                                1.33                 MV Decel Time                       116 ms          >200   MV Annular TDI   ----------------------------------------------------------------------   MV Septal e' Velocity           14.80 cm/s        >=8.00   MV E/e' (Septal)                      5.45        <=8.00   MV Lateral e' Velocity          19.00 cm/s       >=10.00   MV E/e' (Lateral)                     4.24        <=8.00   Tricuspid Valve Measurements   ----------------------------------------------------------------------   Name                                Value        Normal   ----------------------------------------------------------------------   TV Regurgitation Doppler   ----------------------------------------------------------------------   RA Pressure                      3.00 mmHg        <=5.00       MRI head 04-30-23 ([facility])      IMPRESSION:   1. Mild interval improvement in the areas of demyelination particularly within   the right middle cerebellar peduncle.  No evidence of active demyelination.   2. No acute intracranial hemorrhage or evidence of acute ischemia.        Cesarjane (28 days [facility])    1. The patient underwent cardiac Arrhythmia monitoring for a period of 28 days 10 hours and 8   minutes after artifact is  removed. There are 3,201,477   QRS complexes detected.     2. The underlying rhythm is sinus with rates varying from 47 to 185 beats per minute. The   average heart rate is 74 beats per  minute. 18% of the time   is spent in tachycardia.     3. There is no heart block noted. There are no significant pauses seen. 11% of the time is   spent in bradycardia.     4. There are 5124 isolated supraventricular complexes, 82 couplets, and 9 runs.     5. There are 180 isolated premature ventricular complexes.     6. The patient depressed the event marker noting the symptoms of palpitations, feeling faint,   shortness of breath, increase  in cough, and swelling in   legs and during which there was sinus rhythm.     Conclusion: monitor for 1 month.  Frequent events associated with sinus rhythm.  No sustained   arrhythmia were noted.       Assessment and Plan:     I discussed the results with patient.  I discussed the importance of a heart healthy diet and lifestyle.    -Complete tilt table test. Scheduling will call you to set that up.      I advised that patient would be seen by Dr Cooper after we have the results of the tilt test .    Patient under went tilt test on 12/29    Summary results and interpretation    Autonomic studies were normal.  Patient did have symptoms suggestive of a vasovagal reaction following nitroglycerin.  She recalls similar symptoms on other occasions.  She says she does not usually faint but under those conditions would sit down.  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  Once recovered patient was perfectly lucid.     Findings were discussed at length with the patient on station 2A after the procedure.  Medications for  vasovagal symptoms could be considered but initial therapy with increased dietary salt and electrolyte fluids would seem to be a reasonable starting place.  In terms of the psychogenic component, these are difficult to treat but need to be reinforced as a stress related phenomenon that is not under the patient's volitional control.  Medications have proved relatively ineffective but if necessary psychologic counseling may be helpful.     Final Diagnosis  Vasovagal reaction  Psychogenic pseudosyncope/seizure        Edi Chiu MD, PhD  Professor of Medicine  Division of Cardiology     CC  Patient Care Team:  Viviane Desir MD as PCP - General (Internal Medicine)  Coy Wilson MD as Assigned Neuroscience Provider  Shiela Pinon APRN CNP as Nurse Practitioner (Neurology)  VIVIANE DESIR

## 2023-11-10 ENCOUNTER — MYC REFILL (OUTPATIENT)
Dept: NEUROLOGY | Facility: CLINIC | Age: 23
End: 2023-11-10
Payer: COMMERCIAL

## 2023-11-10 DIAGNOSIS — R53.82 CHRONIC FATIGUE: ICD-10-CM

## 2023-11-10 DIAGNOSIS — G35 MULTIPLE SCLEROSIS (H): ICD-10-CM

## 2023-11-13 RX ORDER — MODAFINIL 200 MG/1
TABLET ORAL
Qty: 30 TABLET | Refills: 5 | Status: SHIPPED | OUTPATIENT
Start: 2023-11-13

## 2023-11-13 NOTE — TELEPHONE ENCOUNTER
Patient requesting refill of their modafiniil; Patient was last seen in October 2023 and has follow up appointment in April 2024 with Dr Wilson. Pended rx to Dr Wilson for signature and will send electronically to the pharmacy once signed.    Jerrica Garner RN

## 2023-11-14 ENCOUNTER — DOCUMENTATION ONLY (OUTPATIENT)
Dept: NEUROLOGY | Facility: CLINIC | Age: 23
End: 2023-11-14
Payer: COMMERCIAL

## 2023-11-14 NOTE — PROGRESS NOTES
Plan of treatment has been received from [facility], plan placed in Dr. Wilson's folder for review and signature.   Brennan Aguirre EMT 11/14/2023 2:27PM

## 2023-11-16 ENCOUNTER — TRANSFERRED RECORDS (OUTPATIENT)
Dept: NEUROLOGY | Facility: CLINIC | Age: 23
End: 2023-11-16
Payer: COMMERCIAL

## 2023-11-17 NOTE — PROGRESS NOTES
Plan of treatment faxed back to [facility] at [phone] and scanned to chart.    Jerrica Garner RN

## 2023-11-30 NOTE — TELEPHONE ENCOUNTER
Noted tilt table test is scheduled on 12/29/23. RN is checking with Dr Chiu if pt needs to hold any meds prior.

## 2023-11-30 NOTE — TELEPHONE ENCOUNTER
Final attempt made to schedule the patient for her tilt table study. Will wait for the patient to respond to the messages/letter sent.     Karen Murdock  Periop Electrophysiology   [phone]

## 2023-12-10 NOTE — PROGRESS NOTES
"Referral source: Established patient    Chief complaint: Multiple sclerosis    History of the Present Illness: Ms. Cindi Ferrer is a 23 year old woman who returns to the [facility] today for a scheduled follow up visit regarding her diagnosis of multiple sclerosis.    The patient's history is as per my previous notes.  She initially developed symptoms of demyelinating disease in 2017 when she had horizontal diplopia and left hemibody numbness.  She was admitted to an outside pediatric facility at that time.  MRI scans demonstrated T2 hyperintense lesions suggestive of demyelinating disease of the type seen in multiple sclerosis, and a CSF examination was also positive for oligoclonal bands. She started disease-modifying therapy with natalizumab, and has remained on that treatment to date, with no definite MS relapses since beginning this treatment.    Today, she continues to deny any new episodic changes in vision, balance, strength or sensation suggestive of new relapse of multiple sclerosis since her last visit to the clinic.  She describes her overall status as \"pretty good, pretty stable\".    At her most recent visit on 4/13/2023, we discussed episodes of loss of consciousness.  I had previously felt that these episodes were most suggestive of neurocardiogenic syncope by description, but in April they described episodes that would be more consistent with psychogenic nonepileptic spells versus seizure activity.    I had ordered a 3-hour video EEG to be performed on an outpatient basis.  However, before this was completed, the patient was admitted to [facility] from April 28 to May 2, 2023 and underwent prolonged video EEG monitoring during the hospitalization.  Events were captured and felt consistent with nonepileptic events.  An MRI scan of the brain was also performed during that hospitalization did not demonstrate any evidence of active or interval demyelination.    She did subsequently " complete a 3-hour wake and sleep EEG here as well on 8/18/2023, which was a normal study with no epileptiform abnormality evident.    She has also undergone cardiac monitoring demonstrating intermittent tachycardia, and is due to be evaluated by our colleagues in cardiology in the near future.    Regarding other symptoms, she remains on gabapentin 600 mg in the morning and afternoon and 900 mg the evening for neuropathic pain, which remains well-controlled.    She takes modafinil 100 mg in the morning and early afternoon as needed for fatigue and relates that the medicine continues to be effective.  She has not noted any correlation between taking modafinil and her episodes of tachycardia.    For tremor, she will take clonazepam 0.5 to 1 mg daily as needed.  She has noted that tremor seems to be more frequent around the time of her menses.  Currently she is taking clonazepam 2-3 times per week.    PHYSICAL EXAMINATION:  VITAL SIGNS: Blood pressure 110/72; pulse 62; oxygen saturation 100%.  GENERAL: Well-nourished young adult woman who presents to the evauation alone, awake and alert and in no acute distress.    Assessment/plan:    1. Multiple sclerosis  The patient remains clinically stable with no evidence of active inflammatory demyelination on current disease modifying therapy with natalizumab, which she will continue.    Today, I will check routine laboratory studies for monitoring of this medication to include complete blood counts with differential, hepatic panel, and ADRIANA virus serology.    I advised her to start taking 4000 international units of vitamin D daily on an indefinite basis to try to bring her level up to our goal range of 60-80 mcg/L (from 34 on 4/13/2023).    I will see her back in 6 months for a review.    2. Spells of loss of consciousness  The events described during her hospitalization in [place] this spring are most consistent with psychogenic non-epileptic spells. It is possible that she  may have superimposed episodes of vasovagal/neurocardiogenic syncope, as well. I do not think that she has an epileptic condition, and anticonvulsant medications are not indicated.    Fortunately, she has not had any such spells over the past month, and it is encouraging that these are decreasing in frequency.  She will follow-up with cardiology, as scheduled.    3. Neuropathic pain  She will continue gabapentin at the current dose.    4. Chronic fatigue  She has been taking modafinil intermittently for a number of years, and this predated the onset of her spells of loss of consciousness.  There is no clear association between taking the medicine and heart palpitations/tachycardia that she has noticed.  I think it is fine for her to continue this medication for now.    5. Tremor  She will continue use of clonazepam on an as-needed basis.

## 2023-12-26 NOTE — PROGRESS NOTES
"Cindi Ferrer is a 19 year old female who is being evaluated via a billable video visit.      The patient has been notified of following:     \"This video visit will be conducted via a call between you and your physician/provider. We have found that certain health care needs can be provided without the need for an in-person physical exam.  This service lets us provide the care you need with a video conversation.  If a prescription is necessary we can send it directly to your pharmacy.  If lab work is needed we can place an order for that and you can then stop by our lab to have the test done at a later time.    Video visits are billed at different rates depending on your insurance coverage.  Please reach out to your insurance provider with any questions.    If during the course of the call the physician/provider feels a video visit is not appropriate, you will not be charged for this service.\"    Patient has given verbal consent for Video visit? Yes    How would you like to obtain your AVS? Dahlia    Patient would like the video invitation sent by: Send to e-mail at: [email or web address]    Will anyone else be joining your video visit? No    Provider notes:     Referral source: Established patient    Chief complaint: Multiple sclerosis    History of the Present Illness: Ms. Cindi Frerer is an 19 year old woman who is evaluated in the Multiple Sclerosis Clinic today for scheduled follow up visit regarding her diagnosis of multiple sclerosis.    Due to the recommendations of public health authorities, previously scheduled office visit was converted to a video visit to limit unnecessary exposure in light of the COVID-19 pandemic.    The patient's history is as per my previous notes.  Initial onset of symptoms of demyelinating disease was in 2017 when she had horizontal diplopia and left hemibody numbness.  MRI scan at that time demonstrated T2 hyperintense lesions in the brain and spinal cord suggestive of " "demyelinating disease of the type seen in multiple sclerosis and CSF examination was additionally positive for oligoclonal bands.  The patient was initiated on disease-modifying therapy with natalizumab and remains on that treatment.     Today, the patient continues to deny any new, episodic changes in vision, balance, strength or sensation suggestive of relapse of multiple sclerosis.    Unfortunately, she may have an active COVID-19 infection currently. She had an exposure to her aunt who had a positive test for COVID-19, and at present is feeling \"very weak and nauseous\", although does not have prominent respiratory symptoms. She has been advised to stay quarantined at home for the next 2 weeks.    Regarding other symptoms, she takes nortriptyline at 100 mg for headache, and reports that her headache symptoms are under good control at present.    She is also taking modafinil 100 mg up to twice daily for chronic fatigue, and reports that this definitely helps. Fatigue symptoms have been manageable without much difficulty until her current illness, which has her feeling very worn out in general.     Regarding gait, she has obtained a power chair that she can use for mobility at school, which has been helpful to her.    She continues to take clonazepam as needed for tremor, although recently her tremor symptoms have not been too noticeable and she says that she is only taking the clonazepam about once a month.    Past Medical History:    1. Migraine    Physical Examination: The patient is awake, alert and oriented. Mental status appears to be within age appropriate normal limits. There is no facial droop and no evident dysarthria. No gross focal neurologic deficit is apparent.    Remainder of neurological examination cannot be completed via video visit.    Assessment/plan:    1. Multiple sclerosis  The patient is clinically stable on disease modifying therapy with natalizumab and will continue this medication. We " will try to arrange to have blood counts, liver function tests, ADRIANA serology and vitamin D level drawn at the time of her next infusion, or arrange these at her primary care clinic if this is not possible.    I will see her back in 6 months for a review.    2. Chronic daily headache  She will continue nortriptyline 100 mg at bedtime for this problem, which is currently well controlled on this medication.    3. Chronic fatigue  This is also under relatively good control on modafinil, which she will continue.    4. Tremor  Fortunately this has improved substantially over time, and she can continue intermittent use of clonazepam as needed.    5. Viral syndrome  We discussed that a COVID-19 test would likely not change anything at present as the only approved therapy (remdesivir) is only being used for critically ill ICU patients. She should contact her PCP if her symptoms are worsening.    Video-Visit Details.    Type of service:  Video Visit    Video Start Time: 2:49 pm  Video End Time: 3:06 pm    Originating Location (pt. Location): Home    Distant Location (provider location):  [facility]     Platform used for Video Visit: Ismole       (0) no hurt

## 2023-12-29 ENCOUNTER — APPOINTMENT (OUTPATIENT)
Dept: MEDSURG UNIT | Facility: CLINIC | Age: 23
End: 2023-12-29
Attending: INTERNAL MEDICINE
Payer: COMMERCIAL

## 2023-12-29 ENCOUNTER — HOSPITAL ENCOUNTER (OUTPATIENT)
Facility: CLINIC | Age: 23
Discharge: HOME | End: 2023-12-29
Attending: INTERNAL MEDICINE | Admitting: INTERNAL MEDICINE
Payer: COMMERCIAL

## 2023-12-29 VITALS
SYSTOLIC BLOOD PRESSURE: 110 MMHG | OXYGEN SATURATION: 100 % | WEIGHT: 173.4 LBS | BODY MASS INDEX: 27.87 KG/M2 | HEIGHT: 66 IN | HEART RATE: 60 BPM | DIASTOLIC BLOOD PRESSURE: 65 MMHG | TEMPERATURE: 99 F | RESPIRATION RATE: 18 BRPM

## 2023-12-29 DIAGNOSIS — G90.A POTS (POSTURAL ORTHOSTATIC TACHYCARDIA SYNDROME): ICD-10-CM

## 2023-12-29 DIAGNOSIS — R42 POSITIONAL LIGHTHEADEDNESS: ICD-10-CM

## 2023-12-29 PROCEDURE — 250N000011 HC RX IP 250 OP 636: Performed by: INTERNAL MEDICINE

## 2023-12-29 PROCEDURE — 95921 AUTONOMIC NRV PARASYM INERVJ: CPT | Mod: 26 | Performed by: INTERNAL MEDICINE

## 2023-12-29 PROCEDURE — 999N000132 HC STATISTIC PP CARE STAGE 1

## 2023-12-29 PROCEDURE — 250N000013 HC RX MED GY IP 250 OP 250 PS 637: Performed by: INTERNAL MEDICINE

## 2023-12-29 PROCEDURE — 93660 TILT TABLE EVALUATION: CPT | Mod: 26 | Performed by: INTERNAL MEDICINE

## 2023-12-29 PROCEDURE — 258N000003 HC RX IP 258 OP 636: Performed by: INTERNAL MEDICINE

## 2023-12-29 PROCEDURE — 999N000142 HC STATISTIC PROCEDURE PREP ONLY

## 2023-12-29 PROCEDURE — 272N000001 HC OR GENERAL SUPPLY STERILE: Performed by: INTERNAL MEDICINE

## 2023-12-29 PROCEDURE — 93660 TILT TABLE EVALUATION: CPT | Performed by: INTERNAL MEDICINE

## 2023-12-29 PROCEDURE — 95921 AUTONOMIC NRV PARASYM INERVJ: CPT | Performed by: INTERNAL MEDICINE

## 2023-12-29 RX ORDER — NITROGLYCERIN 0.4 MG/1
TABLET SUBLINGUAL
Status: DISCONTINUED | OUTPATIENT
Start: 2023-12-29 | End: 2023-12-29 | Stop reason: HOSPADM

## 2023-12-29 RX ORDER — LIDOCAINE 40 MG/G
CREAM TOPICAL
Status: DISCONTINUED | OUTPATIENT
Start: 2023-12-29 | End: 2023-12-29 | Stop reason: HOSPADM

## 2023-12-29 RX ORDER — SODIUM CHLORIDE 9 MG/ML
INJECTION, SOLUTION INTRAVENOUS CONTINUOUS
Status: DISCONTINUED | OUTPATIENT
Start: 2023-12-29 | End: 2023-12-29 | Stop reason: HOSPADM

## 2023-12-29 RX ORDER — ONDANSETRON 2 MG/ML
INJECTION INTRAMUSCULAR; INTRAVENOUS
Status: DISCONTINUED | OUTPATIENT
Start: 2023-12-29 | End: 2023-12-29 | Stop reason: HOSPADM

## 2023-12-29 RX ADMIN — SODIUM CHLORIDE 500 ML: 9 INJECTION, SOLUTION INTRAVENOUS at 12:57

## 2023-12-29 RX ADMIN — SODIUM CHLORIDE: 9 INJECTION, SOLUTION INTRAVENOUS at 12:58

## 2023-12-29 ASSESSMENT — ACTIVITIES OF DAILY LIVING (ADL)
ADLS_ACUITY_SCORE: 35
ADLS_ACUITY_SCORE: 35

## 2023-12-29 NOTE — H&P
Electrophysiology Pre-Procedure History and Physical    Cindi Ferrer MRN# [id number]   Age: 23 year old YOB: 2000      Date of Procedure: 12/29/23 [facility]      Date of Exam 12/29/2023 Facility (Same day)       HPI:  Cindi Ferrer is a 23 year old female with past medical history significant for multiple sclerosis, migraines, anxiety and anemia. She has followed with neurology, in spring 2023 she began having episodes of loss of consciousness. EEG testing was ordered and completed while patient was admitted from April 28 to May 2, 2023. Events were captured during this time and felt consistent with nonepileptic events. An MRI scan of the brain was also performed during that hospitalization did not demonstrate any evidence of active or interval demyelination. 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 She had echo completed 7/2023 without any abnormalities noted. She completed 30 day holter monitor in April 2023, with sinus , average 74 bpm. Symptom triggers related to sinus rhythm. She was evaluated by Dr Chiu 10/16/23, he recommended she complete tilt study for further evaluation of symptoms, which she presents for today.          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   Evidence of demyelinating process noted.  LP done   Treated with solumedrol. Doing PT/OT.  Advised neuro psych testing as outpatient when she is 'post acute  recovery'.  Ophtho, PMR and neuro following.   As of 7/31, making good clinical improvement, about 70% back to baseline. No new lesions on f/u imaging.  Full return of sensation in left arm. DVT prophylaxis til walking more than 150 ft/day.  Will need f/u with optho --advised visual field testing.  See scans.   Has f/u with Dr. Coy Wilson at [facility] on 8/17.  aquaporin 4 sadie test was negative.  CSF showed elevated IgG and oligoclonal bands    8/15/17: readmitted to [facility] with evolving symptoms -- repeat MRI and EEG which were stable and normal respectively.  DCed 8/16 for the previously planned outpt f/u.  8/17/17: neuro c/s at [facility]: MRI suggestive of demyelination as seen in MS;  Technically classified as a clinically isolated syndrome vs meeting criteria for MS right now, but with pos oligoclonal bands, likely evolving MS.  c spine MRI obtained.  Repeat course of po steroid.(1250 mg daily x 5 days). Obtaining labs, considering natalizumab.  F/u 2 weeks.    9/1/17: note from Tiffanie Butt, PhD, LP, ABPP, at [facility]: following [facility] for adjustment/coping with new diagnosis.   No current psychological diagnosis.  Can contact her any time: [phone] ()  8/31: f/u done: planning to start natalizumab by IV q 4 weeks.  F/u in 6 weeks. rx clonazepam 0.5 mg tabs QHS severe tremor.  Use sparingly.    10/5-10/7/17: readmission to [facility]: new neuro symptoms, but imaging didn't show new lesions.  No steroids.  F/u 1-2 weeks with neuro as outpt  10/12/17: outpt f/u done: working on starting her infusion.  F/u 6 mo  12/1/17; F/U: new onset tremors, doing PT, OT and seeing counselor at school..  Taking klonipin 1-2/week for anxiety as well as her Vit D.  Continue tysabri.  Tremors are felt to be functional.   Suggested f/u with peds pyschologist at [facility].  Referred for n-psych testing;  F/u APril 18      Vitamin D deficiency 09/21/2017     Priority: Medium     Overview:   8/31/17: per [facility] eval; starting weekly replacement x 12 weeks, then 5000 IU/day thereafter.      MS (multiple sclerosis) (H) 08/31/2017     Priority: Medium            Medications (include herbals and vitamins):      No current facility-administered medications for this encounter.     Current Outpatient Medications   Medication Sig    clonazePAM (KLONOPIN) 0.5 MG tablet TAKE 1 TO 2 TABLETS BY MOUTH AT NIGHT AS NEEDED FOR TREMORS    ferrous sulfate (FEROSUL) 325 (65 Fe) MG tablet Take 325 mg by mouth    gabapentin (NEURONTIN) 300 MG capsule TAKE UP TO 2 CAPSULES BY MOUTH THREE TIMES DAILY    galcanezumab-gnlm (EMGALITY) 120 MG/ML injection inject 120 mg subcutaneous every 28 days    levonorgestrel-ethinyl estradiol (AVIANE) 0.1-20 MG-MCG tablet Take 1 tablet by mouth daily    modafinil (PROVIGIL) 200 MG tablet TAKE 1/2 (ONE-HALF) TABLET BY MOUTH TWICE DAILY AS NEEDED FOR  FATIGUE  IN  THE  MORNING  AND  EARLY  AFTERNOON    natalizumab (TYSABRI) 300 MG/15ML injection Inject 300 mg into the vein once every six weeks    order for DME Equipment being ordered: Wheelchair (manual)    prochlorperazine (COMPAZINE) 5 MG tablet Take 1-2 tablets (5-10 mg) by mouth every 6 hours as needed for nausea or vomiting    SUMAtriptan (IMITREX) 50 MG tablet Take 1-2 tablets ( mg) by mouth at onset of headache for migraine May repeat in 2 hours. Max 4 tablets/24 hours.    vitamin D3 (CHOLECALCIFEROL) 50 mcg (2000 units) tablet Take 1 tablet (50 mcg) by mouth daily           Medication List         Notice    Cannot display discharge medications because the patient has not yet been admitted.              Allergies:    No Known Allergies          Social History:     Social History     Tobacco Use    Smoking status: Never    Smokeless tobacco: Never   Substance Use Topics    Alcohol use: No            Physical Exam:   All vitals have been reviewed  No data found.  No intake/output data recorded.  Airway assessment:   Patient is able to open mouth  wide  Patient is able to stick out tongue}      ENT:   normocepalic, without obvious abnormality     Neck:   supple, symmetrical, trachea midline     Lungs:   No increased work of breathing, good air exchange, clear to auscultation bilaterally, no crackles or wheezing     Cardiovascular:   normal S1 and S2 and no edema             Lab / Radiology Results:     Lab Results   Component Value Date    WBC 8.5 10/12/2023    WBC 5.7 04/08/2021    RBC 4.49 10/12/2023    RBC 5.20 04/08/2021    HGB 12.8 10/12/2023    HGB 11.7 04/08/2021    HCT 38.7 10/12/2023    HCT 39.0 04/08/2021    MCV 86 10/12/2023    MCV 75 04/08/2021    RDW 14.4 10/12/2023    RDW 22.6 04/08/2021     10/12/2023     04/08/2021      Lab Results   Component Value Date    WBC 8.5 10/12/2023    WBC 5.7 04/08/2021     Lab Results   Component Value Date     10/12/2023     04/08/2021     Lab Results   Component Value Date    HGB 12.8 10/12/2023    HGB 11.7 04/08/2021    HCT 38.7 10/12/2023    HCT 39.0 04/08/2021     Lab Results   Component Value Date     10/05/2017    CO2 27 10/05/2017    BUN 6 10/05/2017     Lab Results   Component Value Date     10/05/2017    CO2 27 10/05/2017    BUN 6 10/05/2017             Plan:   Patient's active problems diagnostically and therapeutically optimized for the planned procedure. Patient here for tilt table study. Procedure explained in detail to patient including indications, risks, and benefits. Patient states understanding and wishes to procced.       Eleanor Richter PA-C  [facility]  Electrophysiology Consult Service  Pager: [phone]

## 2023-12-29 NOTE — DISCHARGE INSTRUCTIONS
[facility]  DISCHARGE INSTRUCTIONS FOR   TILT TABLE WITH  VASOVAGAL SYNCOPE    Date:***    Plan:  Your tilt table showed you have vasovagal syncope..  - Increase hydration with goal to take in 64 oz of water/electrolyte fluids per day.     Recommend drinking 8-10 oz. Try to avoid high carbohydrate electrolyte drinks.  - Eat six small meals per day with focus on foods low in carbohydrates and low in gluten.  - Liberalize salt intake.  - Change positions carefully and slowly and maintain safe environment.    Cardiovascular Clinic:  [address] [place] [address], [id number]    Your Care Team:  EP Cardiology   Telephone Number     Nurses:   Krupa RODRIGUEZ [phone]    After business hours: [phone], select option 4, and ask for EP Fellow on-call to be paged.   Non-procedure scheduling:  Fadumo BHAKTA   [phone]   Procedure scheduling:    Karen Murdock   [phone]   Device Clinic (Pacemakers, ICDs, Loop Recorders)    During business hours: [phone]  After business hours:   [phone]- select option 4 and ask for job code [id number].       As always, Thank you for trusting us with your health care needs

## 2023-12-29 NOTE — PROCEDURES
EP PROCEDURE NOTE    *Procedures:*    1. TILT table test.  2. Autonomic function test.     *Cardiologist:*  Javi Cooper    *EP Fellow:*  Devin Myers MD PhD    *Referring MD: *  Dr Viviane England MD ([facility])  Edi Chiu MD ([facility])  Coy Wilson MD ([facility])    *Pre-operative Diagnosis:*  Syncope.    *Complications:*  None.     *Medications:*  NTG 0.4mg SL/None.     *Clinical Profile:*  Ms. Cindi Ferrer is a 23 year old woman who is a student in bio-med sciences at [employer] and who has known Multiple Sclerosis Clinic. Today she is here for a scheduled  visit regarding her   TLOC spells    Cindi carries a diagnosis of multiple sclerosis..... identified in 2017 when she had horizontal diplopia and left hemibody numbness.   MRI scans demonstrated T2 hyperintense lesions suggestive of demyelinating disease of the type seen in multiple sclerosis, and a CSF examination was also positive for oligoclonal bands. She started disease-modifying therapy with natalizumab, and has remained on that treatment to date, with no definite MS relapses since beginning this treatment.     Has had apparent loss of consciousness.  Initially these episodes were most suggestive of neurocardiogenic syncope by description, but in April they described episodes that would be more consistent with psychogenic nonepileptic spells versus seizure activity.   The patient was admitted to [facility] from April 28 to May 2, 2023 and underwent prolonged video EEG monitoring during the hospitalization.  Events were captured and felt consistent with nonepileptic events.  An MRI scan of the brain was also performed during that hospitalization did not demonstrate any evidence of active or interval demyelination.       The patient is here for autonomic testing including tilt testing.     Please see MS Clinic notes by Antonio for details.      PROCEDURE    The risks and benefits of the procedure were explained to the  "patient in   full. Written informed consent was obtained. The patient was brought to the   EP lab in a fasting and hemodynamically stable condition. Physical   examination of the patient did not reveal any carotid bruits, and review of   the chart did not reveal the presence of stroke or TIA within the past   three months.     The following maneuvers were done sequentially:    1- Sitting for 5 minutes:   End of 5 min:  HR 67, /56    2- Standing for 10 minutes:   Baseline HR 74 /56  Immediate Jennifer    BP 86/62     time from standing to jennifer 11 seconds      time from jennfier to recovery 10 seconds HR 94 /67  30 sec: HR 86, /67  1 min: HR 66, /63  2 min: HR 69, /66  3 min: HR 83, /63  4 min: HR 81, /63  5 min: HR 81, /67  6 min: HR 77, BP 95/76  7 min: HR 83, /65  8 min: HR 77, /68  9 min: HR 97, /65     \"fuzzy vision\"  10 min: HR 87, /71    2- Maneuvers in seated position:    A. Carotid sinus massage:  Not done due to age and gender    B. Valsalva:   Baseline: HR 63-68, /82  Phase 1: HR 65, Max /97  Phase 2: , Min /93  Phase 3: Present/Absent  Phase 4 (Overshoot): HR 65, Max /92  Valsalva ratio 103/65= 1.58 (normal)    Symptoms: None    C.  Respiratory sinus arrhythmia  In 69 out 56 delta 13  In 68 out 56 delta 12  In the 72 out 57 delta 15  In 75 out 55 delta 20  In 78 out 54 delta 24  Average delta= 16.8 (normal)    3- Supine rest for 5 minutes:   HR N/A, Max BP N/A    4- TILT at 70 degrees for 20 minutes: (1 liter fluid was given before tilt)  Immediate Jennifer HR N/A   BP N/A     time from standing to jennifer N/A      time from jennifer to recovery  30 seconds HR 77 /72  1 min: HR 77, /74  2 min: HR 68, BP 98/68   early sense of dizziness diminished  3 min: HR 67, /67  4 min: HR 66, /61  5 min: HR 70, BP 97/60  6 min: HR 80, /63  7 min: HR 81, /60  8 min: HR 84, /64  9 " min: HR 78, BP 94/58  10 min: HR 81, /66  12 min: HR 85, BP 95/60  14 min: HR 85, BP 97/62  16 min: HR 77, BP 97/60  18 min: HR 93, /56  20 min: HR 77, /59    5- NTG 0.4mg SL   30 seconds HR 87 /61   1 minute HR 83 /65   2 minutes  BP 97/64   3 minutes  BP 89/63   4 minutes  BP 85/64   5 minutes HR 91 BP 88/51     Symptoms:   Patient felt nauseated at this point in the study and the study was terminated with the patient returned to the supine position.  Shortly thereafter she began to have jerky respiratory motion and her eyes were closed.  Patient was resistant to painful stimuli such as squeezing the shoulder muscle.  Opening the lids revealed the pupils to be upward deviated but shortly thereafter she responded to verbal commands to open her eyes.  Following about 3 to 4 minutes after onset of this sequence of events the patient was alert and awake and recalls being told in [place] that she had had nonepileptic seizure disorder.  Unfortunately blood pressure sensor had been removed and EEG had not been established.  Nonetheless the patient did show evidence of a vasovagal reaction that then appeared to morph into a nonepileptic seizure disorder as had been described in [place].    DISCUSSION:  Autonomic studies were normal.  Patient did have symptoms suggestive of a vasovagal reaction following nitroglycerin.  She recalls similar symptoms on other occasions.  She says she does not usually faint but under those conditions would sit down.  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  Once recovered patient was perfectly lucid.    Findings were discussed at length with the patient on station 2A after the procedure.  Medications for vasovagal symptoms could be considered but initial therapy with increased dietary salt and electrolyte fluids would  seem to be a reasonable starting place.  In terms of the psychogenic component, these are difficult to treat but need to be reinforced as a stress related phenomenon that is not under the patient's volitional control.  Medications have proved relatively ineffective but if necessary psychologic counseling may be helpful    Final Diagnosis  Vasovagal reaction  Psychogenic pseudosyncope/seizure    Dr Cooper was present throughout the entire procedure.      I appreciated the opportunity to see and assess Ms Ferrer and direct the procedure described above with EP Fellow Dr Myers. The above note summarizes my findings and current recommendations. Please do not hesitate to contact me if you have any questions or concerns.    Javi Cooper MD [facility]   Pager [phone]  Office [phone]

## 2023-12-29 NOTE — PROGRESS NOTES
Patient tolerated recovery stage well. VSS. She no longer felt nauseated or any post seizure symptoms. Teaching was done and discharge instructions were given. Patient ambulated, voided, and PIV was removed. Patient discharged from the hospital to home with sister.

## 2023-12-31 NOTE — TELEPHONE ENCOUNTER
[employer] message sent to patient with Dr. Wilson's input.    Kari Billy, MS RN Care Coordinator     No assistance needed

## 2024-02-19 ENCOUNTER — TELEPHONE (OUTPATIENT)
Dept: NEUROLOGY | Facility: CLINIC | Age: 24
End: 2024-02-19
Payer: COMMERCIAL

## 2024-02-22 ENCOUNTER — TELEPHONE (OUTPATIENT)
Dept: NEUROLOGY | Facility: CLINIC | Age: 24
End: 2024-02-22
Payer: COMMERCIAL

## 2024-02-22 NOTE — TELEPHONE ENCOUNTER
Left Voicemail (2nd Attempt) for the patient to call back and schedule the following:    Appointment type: Return MS  Provider: Dr. Wilson  Return date: Around 4/18/2024  Specialty phone number: [phone]  Additional appointment(s) needed: n/a  Additonal Notes: reschedule -provider not in clinic     2nd attempt LVM, send letter  Kiarra Cesar on 2/22/2024 at 1:18 PM

## 2024-03-14 ENCOUNTER — VIRTUAL VISIT (OUTPATIENT)
Dept: CARDIOLOGY | Facility: CLINIC | Age: 24
End: 2024-03-14
Attending: INTERNAL MEDICINE
Payer: COMMERCIAL

## 2024-03-14 VITALS — BODY MASS INDEX: 29.16 KG/M2 | HEIGHT: 65 IN | WEIGHT: 175 LBS

## 2024-03-14 DIAGNOSIS — R55 SYNCOPE AND COLLAPSE: Primary | ICD-10-CM

## 2024-03-14 PROCEDURE — 99215 OFFICE O/P EST HI 40 MIN: CPT | Mod: 95 | Performed by: INTERNAL MEDICINE

## 2024-03-14 ASSESSMENT — PAIN SCALES - GENERAL: PAINLEVEL: NO PAIN (0)

## 2024-03-14 NOTE — PROGRESS NOTES
Virtual Visit Details    Type of service:  Video Visit     HPI:   Candelario Ferrer is a pleasant 23-year-old woman who was recently evaluated for loss of consciousness spells.  Candelario has a past history of MS for which she is being well treated by neurology and seems to be doing quite well.  Her past history also includes probable vasovagal faints and psychogenic pseudo syncope.    During our autonomic evaluation at the [facility] we found no autonomic disturbances.  Patient did however show evidence of psychogenic pseudo syncope with a loss of consciousness spell 3 or 4 minutes duration with shaking.  She also had findings compatible with vasovagal syncope.    Since testing, Candelario does not report any symptomatic recurrences.    At today's visit I summarized our findings from the testing and indicated that from the vasovagal episode perspective probably increasing dietary salt and electrolyte fluid intake would be sufficient but we could make adjustments in the future should symptoms recur.  In terms of the psychogenic pseudo syncope I indicated that these were most likely stress triggered events that are not under volitional control but often resolve after recognition of the problem.  We will have to take a wait-and-see attitude to see if that is the case in this instance.    Candelario seemed pleased with the approach summarized above and had no additional questions at this time.  We will arrange a follow-up in about 1 year but would be happy to see her earlier should symptom recurrences require.    PAST MEDICAL HISTORY:  Past Medical History:   Diagnosis Date    Multiple sclerosis (H) 09/2017       CURRENT MEDICATIONS:  Current Outpatient Medications   Medication Sig Dispense Refill    clonazePAM (KLONOPIN) 0.5 MG tablet TAKE 1 TO 2 TABLETS BY MOUTH AT NIGHT AS NEEDED FOR TREMORS 60 tablet 5    ferrous sulfate (FEROSUL) 325 (65 Fe) MG tablet Take 325 mg by mouth      gabapentin (NEURONTIN) 300 MG capsule TAKE UP TO 2 CAPSULES  "BY MOUTH THREE TIMES DAILY 180 capsule 11    galcanezumab-gnlm (EMGALITY) 120 MG/ML injection inject 120 mg subcutaneous every 28 days 1 mL 12    levonorgestrel-ethinyl estradiol (AVIANE) 0.1-20 MG-MCG tablet Take 1 tablet by mouth daily      modafinil (PROVIGIL) 200 MG tablet TAKE 1/2 (ONE-HALF) TABLET BY MOUTH TWICE DAILY AS NEEDED FOR  FATIGUE  IN  THE  MORNING  AND  EARLY  AFTERNOON 30 tablet 5    natalizumab (TYSABRI) 300 MG/15ML injection Inject 300 mg into the vein once every six weeks      order for DME Equipment being ordered: Wheelchair (manual) 1 Device 0    prochlorperazine (COMPAZINE) 5 MG tablet Take 1-2 tablets (5-10 mg) by mouth every 6 hours as needed for nausea or vomiting 20 tablet 3    SUMAtriptan (IMITREX) 50 MG tablet Take 1-2 tablets ( mg) by mouth at onset of headache for migraine May repeat in 2 hours. Max 4 tablets/24 hours. 12 tablet 9    vitamin D3 (CHOLECALCIFEROL) 50 mcg (2000 units) tablet Take 1 tablet (50 mcg) by mouth daily 90 tablet 3       PAST SURGICAL HISTORY:  Past Surgical History:   Procedure Laterality Date    EP STUDY TILT TABLE N/A 12/29/2023    Procedure: Tilt Table Study;  Surgeon: Javi Cooper MD;  Location:  HE[facility] CARDIAC CATH LAB       ALLERGIES:   No Known Allergies    FAMILY HISTORY:  None identified that are relevant.    SOCIAL HISTORY:  Social History     Tobacco Use    Smoking status: Never    Smokeless tobacco: Never   Substance Use Topics    Alcohol use: No    Drug use: No       ROS:   Constitutional: No fever, chills, or sweats. Weight stable.   ENT: No visual disturbance, ear ache, epistaxis, sore throat.   Cardiovascular: As per HPI.   Respiratory: No cough, hemoptysis.    GI: No nausea, vomiting, .   : No hematuria.   Integument: Negative.   Psychiatric: Negative.   Hematologic:   no easy bleeding.  Neuro: Negative.   Endocrinology: No significant heat or cold intolerance   Musculoskeletal: No myalgia.    Exam:  Ht 1.651 m (5' 5\")   Wt " "79.4 kg (175 lb)   BMI 29.12 kg/m    GENERAL APPEARANCE: healthy, alert and no distress  HEENT: , no central cyanosis  NECK: , JVP not elevated  RESPIRATORY: no rales, rhonchi or wheezes, no use of accessory muscles, no retractions, respirations are unlabored, normal respiratory rate  NEURO: alert and oriented to person/place/time, normal speech, gait and affect  SKIN: no ecchymoses, no rashes    Labs:  CBC RESULTS:   Lab Results   Component Value Date    WBC 8.5 10/12/2023    WBC 5.7 04/08/2021    RBC 4.49 10/12/2023    RBC 5.20 04/08/2021    HGB 12.8 10/12/2023    HGB 11.7 04/08/2021    HCT 38.7 10/12/2023    HCT 39.0 04/08/2021    MCV 86 10/12/2023    MCV 75 (L) 04/08/2021    MCH 28.5 10/12/2023    MCH 22.5 (L) 04/08/2021    MCHC 33.1 10/12/2023    MCHC 30.0 (L) 04/08/2021    RDW 14.4 10/12/2023    RDW 22.6 (H) 04/08/2021     10/12/2023     04/08/2021       BMP RESULTS:  Lab Results   Component Value Date     10/05/2017    POTASSIUM 3.8 10/05/2017    CHLORIDE 105 10/05/2017    CO2 27 10/05/2017    ANIONGAP 8 10/05/2017    GLC 79 10/05/2017    BUN 6 (L) 10/05/2017    CR 0.54 10/05/2017    GFRESTIMATED >90 10/05/2017    GFRESTBLACK >90 10/05/2017    RUPA 8.7 (L) 10/05/2017        INR RESULTS:  No results found for: \"INR\"    Procedures:  PULMONARY FUNCTION TESTS:        No data to display                  ECHOCARDIOGRAM:   No results found for this or any previous visit (from the past 8760 hour(s)).      Assessment and Plan:   1.  Vasovagal syncope-currently under good control with dietary means but further follow-up needed  2.  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  I did review the importance of dealing with stressful conditions and being alert to this particular manifestation.  3.  No underlying structural heart disease noted    Plan  1.  Continue current treatment strategy with reassurance, and dietary salt and electrolyte fluids for hydration  2.  " Follow-up clinic visit 1 year or earlier if needed    Total elapsed time today with chart review, clinic visit and documentation 40 minutes    I very much appreciated the opportunity to see and assess Cindi Ferrer in the clinic today. Please do not hesitate to contact my office if you have any questions or concerns.      Javi Cooper MD  Cardiac Arrhythmia Service  [facility]  [phone]       CC  LUI DESIR

## 2024-03-14 NOTE — PATIENT INSTRUCTIONS
You were seen in the Electrophysiology Clinic today by: Dr Cooper    Plan:     Follow up Visit:   1 year with EP DERRICK       If you have further questions, please utilize Snapd App to contact us.     Your Care Team:    EP Cardiology   Telephone Number     Nurse Line  Olga Lidia Tidwell, RN   Krupa Mandujano, RN  Mikey Pa, MIHAI   [phone]     For scheduling appointments:   Melody   [phone]   For procedure scheduling:    Karen Murdock     [phone]   For the Device Clinic (Pacemakers, ICDs, Loop Recorders)    During business hours: [phone]  After business hours:   [phone]- select option 4 and ask for job code [id number].       On-call cardiologist for after hours or on weekends:   [phone], option #4, and ask to speak to the on-call cardiologist.     Cardiovascular Clinic:   [address] [place] [address]. [place] [address]      As always, Thank you for trusting us with your health care needs!

## 2024-03-14 NOTE — LETTER
3/14/2024      RE: Candelario Ferrer  [address] [place] [address]       Dear Colleague,    Thank you for the opportunity to participate in the care of your patient, Candelario Ferrer, at the [facility]. Please see a copy of my visit note below.    Virtual Visit Details    Type of service:  Video Visit     HPI:   Candelario Ferrer is a pleasant 23-year-old woman who was recently evaluated for loss of consciousness spells.  Candelario has a past history of MS for which she is being well treated by neurology and seems to be doing quite well.  Her past history also includes probable vasovagal faints and psychogenic pseudo syncope.    During our autonomic evaluation at the [place] we found no autonomic disturbances.  Patient did however show evidence of psychogenic pseudo syncope with a loss of consciousness spell 3 or 4 minutes duration with shaking.  She also had findings compatible with vasovagal syncope.    Since testing, Candelario does not report any symptomatic recurrences.    At today's visit I summarized our findings from the testing and indicated that from the vasovagal episode perspective probably increasing dietary salt and electrolyte fluid intake would be sufficient but we could make adjustments in the future should symptoms recur.  In terms of the psychogenic pseudo syncope I indicated that these were most likely stress triggered events that are not under volitional control but often resolve after recognition of the problem.  We will have to take a wait-and-see attitude to see if that is the case in this instance.    Candelario seemed pleased with the approach summarized above and had no additional questions at this time.  We will arrange a follow-up in about 1 year but would be happy to see her earlier should symptom recurrences require.    PAST MEDICAL HISTORY:  Past Medical History:   Diagnosis Date    Multiple sclerosis (H) 09/2017        CURRENT MEDICATIONS:  Current Outpatient Medications   Medication Sig Dispense Refill    clonazePAM (KLONOPIN) 0.5 MG tablet TAKE 1 TO 2 TABLETS BY MOUTH AT NIGHT AS NEEDED FOR TREMORS 60 tablet 5    ferrous sulfate (FEROSUL) 325 (65 Fe) MG tablet Take 325 mg by mouth      gabapentin (NEURONTIN) 300 MG capsule TAKE UP TO 2 CAPSULES BY MOUTH THREE TIMES DAILY 180 capsule 11    galcanezumab-gnlm (EMGALITY) 120 MG/ML injection inject 120 mg subcutaneous every 28 days 1 mL 12    levonorgestrel-ethinyl estradiol (AVIANE) 0.1-20 MG-MCG tablet Take 1 tablet by mouth daily      modafinil (PROVIGIL) 200 MG tablet TAKE 1/2 (ONE-HALF) TABLET BY MOUTH TWICE DAILY AS NEEDED FOR  FATIGUE  IN  THE  MORNING  AND  EARLY  AFTERNOON 30 tablet 5    natalizumab (TYSABRI) 300 MG/15ML injection Inject 300 mg into the vein once every six weeks      order for DME Equipment being ordered: Wheelchair (manual) 1 Device 0    prochlorperazine (COMPAZINE) 5 MG tablet Take 1-2 tablets (5-10 mg) by mouth every 6 hours as needed for nausea or vomiting 20 tablet 3    SUMAtriptan (IMITREX) 50 MG tablet Take 1-2 tablets ( mg) by mouth at onset of headache for migraine May repeat in 2 hours. Max 4 tablets/24 hours. 12 tablet 9    vitamin D3 (CHOLECALCIFEROL) 50 mcg (2000 units) tablet Take 1 tablet (50 mcg) by mouth daily 90 tablet 3       PAST SURGICAL HISTORY:  Past Surgical History:   Procedure Laterality Date    EP STUDY TILT TABLE N/A 12/29/2023    Procedure: Tilt Table Study;  Surgeon: Javi Cooper MD;  Location: [facility] CARDIAC CATH LAB       ALLERGIES:   No Known Allergies    FAMILY HISTORY:  None identified that are relevant.    SOCIAL HISTORY:  Social History     Tobacco Use    Smoking status: Never    Smokeless tobacco: Never   Substance Use Topics    Alcohol use: No    Drug use: No       ROS:   Constitutional: No fever, chills, or sweats. Weight stable.   ENT: No visual disturbance, ear ache, epistaxis, sore throat.  "  Cardiovascular: As per HPI.   Respiratory: No cough, hemoptysis.    GI: No nausea, vomiting, .   : No hematuria.   Integument: Negative.   Psychiatric: Negative.   Hematologic:   no easy bleeding.  Neuro: Negative.   Endocrinology: No significant heat or cold intolerance   Musculoskeletal: No myalgia.    Exam:  Ht 1.651 m (5' 5\")   Wt 79.4 kg (175 lb)   BMI 29.12 kg/m    GENERAL APPEARANCE: healthy, alert and no distress  HEENT: , no central cyanosis  NECK: , JVP not elevated  RESPIRATORY: no rales, rhonchi or wheezes, no use of accessory muscles, no retractions, respirations are unlabored, normal respiratory rate  NEURO: alert and oriented to person/place/time, normal speech, gait and affect  SKIN: no ecchymoses, no rashes    Labs:  CBC RESULTS:   Lab Results   Component Value Date    WBC 8.5 10/12/2023    WBC 5.7 04/08/2021    RBC 4.49 10/12/2023    RBC 5.20 04/08/2021    HGB 12.8 10/12/2023    HGB 11.7 04/08/2021    HCT 38.7 10/12/2023    HCT 39.0 04/08/2021    MCV 86 10/12/2023    MCV 75 (L) 04/08/2021    MCH 28.5 10/12/2023    MCH 22.5 (L) 04/08/2021    MCHC 33.1 10/12/2023    MCHC 30.0 (L) 04/08/2021    RDW 14.4 10/12/2023    RDW 22.6 (H) 04/08/2021     10/12/2023     04/08/2021       BMP RESULTS:  Lab Results   Component Value Date     10/05/2017    POTASSIUM 3.8 10/05/2017    CHLORIDE 105 10/05/2017    CO2 27 10/05/2017    ANIONGAP 8 10/05/2017    GLC 79 10/05/2017    BUN 6 (L) 10/05/2017    CR 0.54 10/05/2017    GFRESTIMATED >90 10/05/2017    GFRESTBLACK >90 10/05/2017    RUPA 8.7 (L) 10/05/2017        INR RESULTS:  No results found for: \"INR\"    Procedures:  PULMONARY FUNCTION TESTS:        No data to display                  ECHOCARDIOGRAM:   No results found for this or any previous visit (from the past 8760 hour(s)).      Assessment and Plan:   1.  Vasovagal syncope-currently under good control with dietary means but further follow-up needed  2.  Psychogenic pseudo syncope-no " recent recurrences but potentially stress related.  I did review the importance of dealing with stressful conditions and being alert to this particular manifestation.  3.  No underlying structural heart disease noted    Plan  1.  Continue current treatment strategy with reassurance, and dietary salt and electrolyte fluids for hydration  2.  Follow-up clinic visit 1 year or earlier if needed    Total elapsed time today with chart review, clinic visit and documentation 40 minutes    I very much appreciated the opportunity to see and assess Cindi Ferrer in the clinic today. Please do not hesitate to contact my office if you have any questions or concerns.      Javi Cooper MD  Cardiac Arrhythmia Service  [facility]  [phone]       CC  LUI DESIR

## 2024-03-14 NOTE — NURSING NOTE
Patient confirms medications and allergies are accurate via patients echeck in completion, and or denies any changes since last reviewed/verified.       Is the patient currently in the state of [place]? YES    Visit mode:VIDEO    If the visit is dropped, the patient can be reconnected by: VIDEO VISIT: Text to cell phone:   Telephone Information:   Mobile [phone]       Will anyone else be joining the visit? NO  (If patient encounters technical issues they should call [phone])    How would you like to obtain your AVS? MyChart    Are changes needed to the allergy or medication list? No    Reason for visit: RECHECK    Niyah BIRD

## 2024-03-21 ENCOUNTER — DOCUMENTATION ONLY (OUTPATIENT)
Dept: NEUROLOGY | Facility: CLINIC | Age: 24
End: 2024-03-21
Payer: COMMERCIAL

## 2024-03-21 ENCOUNTER — MEDICAL CORRESPONDENCE (OUTPATIENT)
Dept: HEALTH INFORMATION MANAGEMENT | Facility: CLINIC | Age: 24
End: 2024-03-21
Payer: COMMERCIAL

## 2024-03-21 NOTE — PROGRESS NOTES
Prescriber order for tysabri have been received. Forms have been signed and faxed back at [phone].  Brennan Aguirre EMT March 21, 2024

## 2024-03-25 ENCOUNTER — DOCUMENTATION ONLY (OUTPATIENT)
Dept: NEUROLOGY | Facility: CLINIC | Age: 24
End: 2024-03-25
Payer: COMMERCIAL

## 2024-03-25 NOTE — PROGRESS NOTES
Reauthorization questionnaire has been received from [employer], forms placed in Dr. Wilson's folder for review and signature.   Brennan Aguirre EMT March 25, 2024

## 2024-04-02 NOTE — PROGRESS NOTES
[place] [facility] [place]    Jerrica Garner RN

## 2024-04-10 NOTE — TELEPHONE ENCOUNTER
1 month refill for clonazepam called into pharmacy. Poll Everywhere message sent to Cindi letting her know.   
Dr. Wilson, please see [facility]'s MyChart message below. Assuming she is referring to the clonazepam that was ordered in August. Are you willing to refill this?  
We can refill clonazepam--let's go month to month on the refills for now.  
oral

## 2024-04-14 NOTE — PLAN OF CARE
Problem: Patient Care Overview  Goal: Plan of Care/Patient Progress Review  Outcome: Improving  VSS. No c/o nausea. Pt had mild c/o an intermittent headache but denied interventions. PIV saline locked, good PO intake and appetite noted. Aunt at bedside overnight, pt slept well. Hourly rounding complete. Will continue to monitor.        decreased strength

## 2024-04-19 NOTE — PROGRESS NOTES
[employer] has still not received this form. Form refaxed and emailed.     Jerrica Garner RN

## 2024-04-19 NOTE — PROGRESS NOTES
Approval for Tysabri has been received from [employer], authorization valid from 04/24/2024 through 10/23/2024.  Brennan Aguirre EMT April 19, 2024

## 2024-05-09 ENCOUNTER — LAB (OUTPATIENT)
Dept: LAB | Facility: CLINIC | Age: 24
End: 2024-05-09
Payer: COMMERCIAL

## 2024-05-09 ENCOUNTER — OFFICE VISIT (OUTPATIENT)
Dept: NEUROLOGY | Facility: CLINIC | Age: 24
End: 2024-05-09
Attending: PSYCHIATRY & NEUROLOGY
Payer: COMMERCIAL

## 2024-05-09 VITALS
DIASTOLIC BLOOD PRESSURE: 76 MMHG | BODY MASS INDEX: 28.42 KG/M2 | WEIGHT: 170.8 LBS | SYSTOLIC BLOOD PRESSURE: 118 MMHG | OXYGEN SATURATION: 100 % | HEART RATE: 96 BPM

## 2024-05-09 DIAGNOSIS — R25.1 TREMOR: ICD-10-CM

## 2024-05-09 DIAGNOSIS — R53.82 CHRONIC FATIGUE, UNSPECIFIED: ICD-10-CM

## 2024-05-09 DIAGNOSIS — M79.2 NEUROPATHIC PAIN: ICD-10-CM

## 2024-05-09 DIAGNOSIS — G35 MS (MULTIPLE SCLEROSIS) (H): Primary | ICD-10-CM

## 2024-05-09 DIAGNOSIS — G35 MS (MULTIPLE SCLEROSIS) (H): ICD-10-CM

## 2024-05-09 LAB
ALBUMIN SERPL BCG-MCNC: 4.3 G/DL (ref 3.5–5.2)
ALP SERPL-CCNC: 98 U/L (ref 40–150)
ALT SERPL W P-5'-P-CCNC: 13 U/L (ref 0–50)
AST SERPL W P-5'-P-CCNC: 18 U/L (ref 0–45)
BASOPHILS # BLD AUTO: 0 10E3/UL (ref 0–0.2)
BASOPHILS NFR BLD AUTO: 1 %
BILIRUB DIRECT SERPL-MCNC: <0.2 MG/DL (ref 0–0.3)
BILIRUB SERPL-MCNC: 0.3 MG/DL
EOSINOPHIL # BLD AUTO: 0.1 10E3/UL (ref 0–0.7)
EOSINOPHIL NFR BLD AUTO: 2 %
ERYTHROCYTE [DISTWIDTH] IN BLOOD BY AUTOMATED COUNT: 16 % (ref 10–15)
HCT VFR BLD AUTO: 39.2 % (ref 35–47)
HGB BLD-MCNC: 12.6 G/DL (ref 11.7–15.7)
IMM GRANULOCYTES # BLD: 0 10E3/UL
IMM GRANULOCYTES NFR BLD: 0 %
LYMPHOCYTES # BLD AUTO: 4 10E3/UL (ref 0.8–5.3)
LYMPHOCYTES NFR BLD AUTO: 46 %
MCH RBC QN AUTO: 27.5 PG (ref 26.5–33)
MCHC RBC AUTO-ENTMCNC: 32.1 G/DL (ref 31.5–36.5)
MCV RBC AUTO: 86 FL (ref 78–100)
MONOCYTES # BLD AUTO: 0.7 10E3/UL (ref 0–1.3)
MONOCYTES NFR BLD AUTO: 9 %
NEUTROPHILS # BLD AUTO: 3.7 10E3/UL (ref 1.6–8.3)
NEUTROPHILS NFR BLD AUTO: 42 %
NRBC # BLD AUTO: 0 10E3/UL
NRBC BLD AUTO-RTO: 1 /100
PLATELET # BLD AUTO: 312 10E3/UL (ref 150–450)
PROT SERPL-MCNC: 7.2 G/DL (ref 6.4–8.3)
RBC # BLD AUTO: 4.58 10E6/UL (ref 3.8–5.2)
WBC # BLD AUTO: 8.6 10E3/UL (ref 4–11)

## 2024-05-09 PROCEDURE — 85025 COMPLETE CBC W/AUTO DIFF WBC: CPT | Performed by: PATHOLOGY

## 2024-05-09 PROCEDURE — G2211 COMPLEX E/M VISIT ADD ON: HCPCS | Performed by: PSYCHIATRY & NEUROLOGY

## 2024-05-09 PROCEDURE — G0463 HOSPITAL OUTPT CLINIC VISIT: HCPCS | Performed by: PSYCHIATRY & NEUROLOGY

## 2024-05-09 PROCEDURE — 99214 OFFICE O/P EST MOD 30 MIN: CPT | Performed by: PSYCHIATRY & NEUROLOGY

## 2024-05-09 PROCEDURE — 80076 HEPATIC FUNCTION PANEL: CPT | Performed by: PATHOLOGY

## 2024-05-09 PROCEDURE — 36415 COLL VENOUS BLD VENIPUNCTURE: CPT | Performed by: PATHOLOGY

## 2024-05-09 RX ORDER — CLONAZEPAM 0.5 MG/1
TABLET ORAL
Qty: 60 TABLET | Refills: 1 | Status: SHIPPED | OUTPATIENT
Start: 2024-05-09

## 2024-05-09 ASSESSMENT — PAIN SCALES - GENERAL: PAINLEVEL: NO PAIN (0)

## 2024-05-09 NOTE — PATIENT INSTRUCTIONS
Blood tests today    2.   Continue gabapentin as you are doing    3.   Continue clonazepam as needed    4.   Return to clinic in 6 months

## 2024-05-09 NOTE — PROGRESS NOTES
"Referral source: Established patient    Chief complaint: Multiple sclerosis    History of the Present Illness: Ms. Cindi Ferrer is a [age] old 23 right-handed woman who presents to the Multiple Sclerosis Clinic today for a scheduled follow up visit regarding her diagnosis of multiple sclerosis.    The patient's history is as per my previous notes.  She initially developed symptoms of demyelinating disease in 2017 when she had horizontal diplopia and left hemibody numbness.  She was admitted to an outside pediatric facility at that time.  MRI scans demonstrated T2 hyperintense lesions suggestive of demyelinating disease of the type seen in multiple sclerosis, and a CSF examination was also positive for oligoclonal bands. She started disease-modifying therapy with natalizumab, and has remained on that treatment to date, with no definite MS relapses since beginning this treatment.     Today, she continues to deny any new episodic changes in vision, balance, strength or sensation suggestive of new relapse of multiple sclerosis since she was last seen.    She states that overall she has \"no real concerns\".  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    Symptomatically, she is finding her fatigue symptoms manageable without modafinil and is not taking this medication at present.      She does continue to experience intermittent tremors, but these are self-limited.  She indicates that she has not been feeling need to take clonazepam and does not like the way the medication makes her feel.    She does remain on gabapentin 600 mg in the morning and 900 mg in the evening for neuropathic discomfort, which is well-controlled.      PHYSICAL EXAMINATION:  VITAL SIGNS: Blood pressure 118/76; pulse 96; oxygen saturation 100%; weigth 77.5 kg.  GENERAL: Well-nourished young adult woman who presents to " the examination alone, awake and alert and in no acute distress.    NEUROLOGIC EXAMINATION:  CRANIAL NERVES: Visual fields are full to confrontation.  Extraocular movements are intact with no internuclear ophthalmoplegia.  Facial strength is normal.  Palate elevation and tongue protrusion are normal.  POWER: Strength is within normal limits in proximal and distal muscles in the upper and lower limbs throughout.  REFLEXES: Reflexes are 3 + throughout, but roughly symmetric,  in the arms and legs.  MOTOR/CEREBELLAR: There is no appendicular ataxia on finger-to-nose testing.  Rapid alternating movements are within normal limits in the hands and fingers.  There is no pronator drift in the arms.  GAIT: The patient is able to ambulate on a flat, level surface with no gross loss of postural stability, and able to walk on heels, toes and in tandem.    Assessment/plan:    1. Multiple sclerosis  The patient is clinically stable as regards any evidence of active inflammatory demyelination on current disease modifying therapy with natalizumab, which she will continue.     Today, I will check laboratory studies for monitoring of this medication to include complete blood counts with differential, hepatic panel, and ADRIANA virus serology.    I will see her back in 6 months for a review.    2. Chronic fatigue  Currently symptoms are manageable without medication; we can re-assess in the future as needed.    3. Tremor   She can continue clonazepam 0.5 to 1 mg at night as needed for tremor.    4. Neuropathic pain  She will continue gabapentin at the above doses.    The longitudinal plans of care for the diagnoses as documented were addressed during this visit. Due to the added complexity in care, I will continue to support the patient in subsequent management and with ongoing continuity of care.    ADDENDUM: Unfortunately, the patient's ADRIANA virus serology returned positive. After discussion, we transitioned her to treatment with  ocrelizumab, which was begun in June 2024.

## 2024-05-09 NOTE — NURSING NOTE
Chief Complaint   Patient presents with    MS    RECHECK     MS follow up      Vitals were taken and medications were reconciled.   Brennan Aguirre, EMT  9:27 AM

## 2024-05-09 NOTE — LETTER
"5/9/2024       RE: Cindi Ferrer  [address]  [place] [address]       Dear Colleague,    Thank you for referring your patient, Cindi Ferrer, to the [facility] MULTIPLE SCLEROSIS CLINIC [place] at [facility]. Please see a copy of my visit note below.    Referral source: Established patient    Chief complaint: Multiple sclerosis    History of the Present Illness: Ms. Cindi Ferrer is a year old 23 right-handed woman who presents to the Multiple Sclerosis Clinic today for a scheduled follow up visit regarding her diagnosis of multiple sclerosis.    The patient's history is as per my previous notes.  She initially developed symptoms of demyelinating disease in 2017 when she had horizontal diplopia and left hemibody numbness.  She was admitted to an outside pediatric facility at that time.  MRI scans demonstrated T2 hyperintense lesions suggestive of demyelinating disease of the type seen in multiple sclerosis, and a CSF examination was also positive for oligoclonal bands. She started disease-modifying therapy with natalizumab, and has remained on that treatment to date, with no definite MS relapses since beginning this treatment.     Today, she continues to deny any new episodic changes in vision, balance, strength or sensation suggestive of new relapse of multiple sclerosis since she was last seen.    She states that overall she has \"no real concerns\".  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    Symptomatically, she is finding her fatigue symptoms manageable without modafinil and is not taking this medication at present.      She does continue to experience intermittent tremors, but these are self-limited.  She indicates that she has not been feeling need to take clonazepam and does not like the way the medication makes " her feel.    She does remain on gabapentin 600 mg in the morning and 900 mg in the evening for neuropathic discomfort, which is well-controlled.      PHYSICAL EXAMINATION:  VITAL SIGNS: Blood pressure 118/76; pulse 96; oxygen saturation 100%; weigth 77.5 kg.  GENERAL: Well-nourished young adult woman who presents to the examination alone, awake and alert and in no acute distress.    NEUROLOGIC EXAMINATION:  CRANIAL NERVES: Visual fields are full to confrontation.  Extraocular movements are intact with no internuclear ophthalmoplegia.  Facial strength is normal.  Palate elevation and tongue protrusion are normal.  POWER: Strength is within normal limits in proximal and distal muscles in the upper and lower limbs throughout.  REFLEXES: Reflexes are 3 + throughout, but roughly symmetric,  in the arms and legs.  MOTOR/CEREBELLAR: There is no appendicular ataxia on finger-to-nose testing.  Rapid alternating movements are within normal limits in the hands and fingers.  There is no pronator drift in the arms.  GAIT: The patient is able to ambulate on a flat, level surface with no gross loss of postural stability, and able to walk on heels, toes and in tandem.    Assessment/plan:    1. Multiple sclerosis  The patient is clinically stable as regards any evidence of active inflammatory demyelination on current disease modifying therapy with natalizumab, which she will continue.     Today, I will check laboratory studies for monitoring of this medication to include complete blood counts with differential, hepatic panel, and ADRIANA virus serology.    I will see her back in 6 months for a review.    2. Chronic fatigue  Currently symptoms are manageable without medication; we can re-assess in the future as needed.    3. Tremor   She can continue clonazepam 0.5 to 1 mg at night as needed for tremor.    4. Neuropathic pain  She will continue gabapentin at the above doses.    The longitudinal plans of care for the diagnoses as  documented were addressed during this visit. Due to the added complexity in care, I will continue to support the patient in subsequent management and with ongoing continuity of care.    ADDENDUM: Unfortunately, the patient's ADRIANA virus serology returned positive. After discussion, we transitioned her to treatment with ocrelizumab, which was begun in June 2024.            Again, thank you for allowing me to participate in the care of your patient.      Sincerely,    Coy Wilson MD

## 2024-05-14 LAB — SCANNED LAB RESULT: ABNORMAL

## 2024-05-17 ENCOUNTER — TELEPHONE (OUTPATIENT)
Dept: NEUROLOGY | Facility: CLINIC | Age: 24
End: 2024-05-17
Payer: COMMERCIAL

## 2024-05-17 NOTE — TELEPHONE ENCOUNTER
Most recent JCV ab lab positive and Dr Wilson recommends treatment change. 5/20 appt slot at 1 pm ([facility]) held for pt for virtual appointment.     VM left for pt asking for call back to clinic. Tekmi message sent.    Jerrica Garner RN

## 2024-05-17 NOTE — TELEPHONE ENCOUNTER
Pt has been scheduled for virtual follow up on 5/20 at 1 pm, [facility].    Jerrica Garner RN

## 2024-05-20 ENCOUNTER — VIRTUAL VISIT (OUTPATIENT)
Dept: NEUROLOGY | Facility: CLINIC | Age: 24
End: 2024-05-20
Payer: COMMERCIAL

## 2024-05-20 DIAGNOSIS — G35 MS (MULTIPLE SCLEROSIS) (H): Primary | ICD-10-CM

## 2024-05-20 PROCEDURE — 99213 OFFICE O/P EST LOW 20 MIN: CPT | Mod: 95 | Performed by: PSYCHIATRY & NEUROLOGY

## 2024-05-20 PROCEDURE — G2211 COMPLEX E/M VISIT ADD ON: HCPCS | Mod: 95 | Performed by: PSYCHIATRY & NEUROLOGY

## 2024-05-20 NOTE — Clinical Note
5/20/2024      Cindi Ferrer  [address] [place] [address]      Dear Colleague,    Thank you for referring your patient, Cindi Ferrer, to the [facility] NEUROLOGY CLINIC [place]. Please see a copy of my visit note below.    Referral source: Established patient    Chief complaint: Multiple sclerosis    History of the Present Illness: Ms. Cindi Ferrer is a 23 year old right-handed woman who is evaluated in the Multiple Sclerosis Clinic today for follow up regarding her diagnosis of multiple sclerosis.    At the request of the patient, today's appointment was conducted via telemedicine (video visit).    The patient's history is as per my previous notes.  She initially developed symptoms of demyelinating disease in 2017 when she had horizontal diplopia and left hemibody numbness.  She was admitted to an outside pediatric facility at that time.  MRI scans demonstrated T2 hyperintense lesions suggestive of demyelinating disease of the type seen in multiple sclerosis, and a CSF examination was also positive for oligoclonal bands. She started disease-modifying therapy with natalizumab, and has remained on that treatment to date, with no definite MS relapses since beginning this treatment. She reported clinical stability on 5/9/2024.    The situation now is that the patient tested positive for the ADRIANA virus on 5/9/2024 with a JCV virus index of 0.87.  Accordingly, this appointment was arranged today to discuss alternate therapeutic options.    Assessment/plan:    Multiple sclerosis  Seropositivity for the ADRIANA virus in the setting of treatment with natalizumab significantly increases the risk of progressive multifocal leukoencephalopathy (PML) from the less than 1 in 10,000 risk in ADRIANA seronegative individuals.    With a ADRIANA virus index of less than 0.9, the risk of PML on natalizumab is not thought to be greater than ~1/500, but given the serious and potentially fatal nature of the infection, this is still  an important consideration.  Further, her risk would be additionally increased if the ADRIANA index continues to rise.    I told her that in this circumstance, most people with MS will choose to switch to a different medication. In Ms. Ferrer's case, she has been on a high efficacy therapy from the outset of her treatment.  Lower efficacy therapies including oral medications and even injections could theoretically be considered, but I think that she would be best served by remaining on a high efficacy therapy, as there is no guarantee that less efficacious therapies would control the inflammatory aspect of her disease.    I recommended ocrelizumab.  We discussed the standard treatment initiation schedule for this medication, with two 300 mg IV injections administered  by 2 weeks, then a single 600 mg IV injection every 6 months thereafter.  We discussed that there are risks of infection associated with this treatment, including a very small number of cases of PML worldwide, but that the risk in this regard is much lower than with natalizumab.  We also discussed that treatment with ocrelizumab may reduce the body's ability to produce an effective long-term immune response against a newly acquired infection and also is likely to reduce the efficacy of vaccines received while on this treatment.  If she is due for any vaccine boosters at this time, I would recommend having those administered now before she starts the treatment.    I will see her back for review in about 6 months, as previously scheduled.    The longitudinal plan of care for the diagnosis as documented was addressed during this visit. Due to the added complexity in care, I will continue to support the patient in subsequent management and with ongoing continuity of care.      Again, thank you for allowing me to participate in the care of your patient.        Sincerely,        Coy Wilson MD

## 2024-05-20 NOTE — PROGRESS NOTES
Referral source: Established patient    Chief complaint: Multiple sclerosis    History of the Present Illness: Ms. Cindi Ferrer is a 23 year old right-handed woman who is evaluated in the Multiple Sclerosis Clinic today for follow up regarding her diagnosis of multiple sclerosis.    At the request of the patient, today's appointment was conducted via telemedicine (video visit).    The patient's history is as per my previous notes.  She initially developed symptoms of demyelinating disease in 2017 when she had horizontal diplopia and left hemibody numbness.  She was admitted to an outside pediatric facility at that time.  MRI scans demonstrated T2 hyperintense lesions suggestive of demyelinating disease of the type seen in multiple sclerosis, and a CSF examination was also positive for oligoclonal bands. She started disease-modifying therapy with natalizumab, and has remained on that treatment to date, with no definite MS relapses since beginning this treatment. She reported clinical stability on 5/9/2024.    The situation now is that the patient tested positive for the ADRIANA virus on 5/9/2024 with a JCV virus index of 0.87.  Accordingly, this appointment was arranged today to discuss alternate therapeutic options.    Assessment/plan:    Multiple sclerosis  Seropositivity for the ADRIANA virus in the setting of treatment with natalizumab significantly increases the risk of progressive multifocal leukoencephalopathy (PML) from the less than 1 in 10,000 risk in ADRIANA seronegative individuals.    With a ADRIANA virus index of less than 0.9, the risk of PML on natalizumab is not thought to be greater than ~1/500, but given the serious and potentially fatal nature of the infection, this is still an important consideration.  Further, her risk would be additionally increased if the ADRIANA index continues to rise.    I told her that in this circumstance, most people with MS will choose to switch to a different medication. In Ms. Ferrer's case,  she has been on a high efficacy therapy from the outset of her treatment.  Lower efficacy therapies including oral medications and even injections could theoretically be considered, but I think that she would be best served by remaining on a high efficacy therapy, as there is no guarantee that less efficacious therapies would control the inflammatory aspect of her disease.    I recommended ocrelizumab.  We discussed the standard treatment initiation schedule for this medication, with two 300 mg IV injections administered  by 2 weeks, then a single 600 mg IV injection every 6 months thereafter.  We discussed that there are risks of infection associated with this treatment, including a very small number of cases of PML worldwide, but that the risk in this regard is much lower than with natalizumab.  We also discussed that treatment with ocrelizumab may reduce the body's ability to produce an effective long-term immune response against a newly acquired infection and also is likely to reduce the efficacy of vaccines received while on this treatment.  If she is due for any vaccine boosters at this time, I would recommend having those administered now before she starts the treatment.    I will see her back for review in about 6 months, as previously scheduled.    The longitudinal plan of care for the diagnosis as documented was addressed during this visit. Due to the added complexity in care, I will continue to support the patient in subsequent management and with ongoing continuity of care.

## 2024-05-20 NOTE — LETTER
5/20/2024      RE: Cindi Ferrer  [address]  [place] [address]     Referral source: Established patient    Chief complaint: Multiple sclerosis    History of the Present Illness: Ms. Cindi Ferrer is a 23 year old right-handed woman who is evaluated in the Multiple Sclerosis Clinic today for follow up regarding her diagnosis of multiple sclerosis.    At the request of the patient, today's appointment was conducted via telemedicine (video visit).    The patient's history is as per my previous notes.  She initially developed symptoms of demyelinating disease in 2017 when she had horizontal diplopia and left hemibody numbness.  She was admitted to an outside pediatric facility at that time.  MRI scans demonstrated T2 hyperintense lesions suggestive of demyelinating disease of the type seen in multiple sclerosis, and a CSF examination was also positive for oligoclonal bands. She started disease-modifying therapy with natalizumab, and has remained on that treatment to date, with no definite MS relapses since beginning this treatment. She reported clinical stability on 5/9/2024.    The situation now is that the patient tested positive for the ADRIANA virus on 5/9/2024 with a JCV virus index of 0.87.  Accordingly, this appointment was arranged today to discuss alternate therapeutic options.    Assessment/plan:    Multiple sclerosis  Seropositivity for the ADRIANA virus in the setting of treatment with natalizumab significantly increases the risk of progressive multifocal leukoencephalopathy (PML) from the less than 1 in 10,000 risk in ADRIANA seronegative individuals.    With a ADRIANA virus index of less than 0.9, the risk of PML on natalizumab is not thought to be greater than ~1/500, but given the serious and potentially fatal nature of the infection, this is still an important consideration.  Further, her risk would be additionally increased if the ADRIANA index continues to rise.    I told her that in this circumstance, most people with  MS will choose to switch to a different medication. In Ms. Ferrer's case, she has been on a high efficacy therapy from the outset of her treatment.  Lower efficacy therapies including oral medications and even injections could theoretically be considered, but I think that she would be best served by remaining on a high efficacy therapy, as there is no guarantee that less efficacious therapies would control the inflammatory aspect of her disease.    I recommended ocrelizumab.  We discussed the standard treatment initiation schedule for this medication, with two 300 mg IV injections administered  by 2 weeks, then a single 600 mg IV injection every 6 months thereafter.  We discussed that there are risks of infection associated with this treatment, including a very small number of cases of PML worldwide, but that the risk in this regard is much lower than with natalizumab.  We also discussed that treatment with ocrelizumab may reduce the body's ability to produce an effective long-term immune response against a newly acquired infection and also is likely to reduce the efficacy of vaccines received while on this treatment.  If she is due for any vaccine boosters at this time, I would recommend having those administered now before she starts the treatment.    I will see her back for review in about 6 months, as previously scheduled.    The longitudinal plan of care for the diagnosis as documented was addressed during this visit. Due to the added complexity in care, I will continue to support the patient in subsequent management and with ongoing continuity of care.    Coy Wilson MD   of Neurology  [facility]    Cc:  Viviane England MD (PCP)  Patient

## 2024-06-03 ENCOUNTER — TELEPHONE (OUTPATIENT)
Dept: NEUROLOGY | Facility: CLINIC | Age: 24
End: 2024-06-03
Payer: COMMERCIAL

## 2024-06-03 DIAGNOSIS — G35 MULTIPLE SCLEROSIS (H): Primary | ICD-10-CM

## 2024-06-03 RX ORDER — METHYLPREDNISOLONE SODIUM SUCCINATE 125 MG/2ML
125 INJECTION, POWDER, LYOPHILIZED, FOR SOLUTION INTRAMUSCULAR; INTRAVENOUS ONCE
OUTPATIENT
Start: 2024-06-03

## 2024-06-03 RX ORDER — ALBUTEROL SULFATE 90 UG/1
1-2 AEROSOL, METERED RESPIRATORY (INHALATION)
Start: 2024-06-03

## 2024-06-03 RX ORDER — DIPHENHYDRAMINE HCL 25 MG
50 CAPSULE ORAL ONCE
OUTPATIENT
Start: 2024-06-03

## 2024-06-03 RX ORDER — MEPERIDINE HYDROCHLORIDE 25 MG/ML
25 INJECTION INTRAMUSCULAR; INTRAVENOUS; SUBCUTANEOUS EVERY 30 MIN PRN
OUTPATIENT
Start: 2024-06-03

## 2024-06-03 RX ORDER — EPINEPHRINE 1 MG/ML
0.3 INJECTION, SOLUTION, CONCENTRATE INTRAVENOUS EVERY 5 MIN PRN
OUTPATIENT
Start: 2024-06-03

## 2024-06-03 RX ORDER — HEPARIN SODIUM,PORCINE 10 UNIT/ML
5-20 VIAL (ML) INTRAVENOUS DAILY PRN
OUTPATIENT
Start: 2024-06-03

## 2024-06-03 RX ORDER — METHYLPREDNISOLONE SODIUM SUCCINATE 125 MG/2ML
125 INJECTION, POWDER, LYOPHILIZED, FOR SOLUTION INTRAMUSCULAR; INTRAVENOUS
Start: 2024-06-03

## 2024-06-03 RX ORDER — DIPHENHYDRAMINE HYDROCHLORIDE 50 MG/ML
50 INJECTION INTRAMUSCULAR; INTRAVENOUS
Start: 2024-06-03

## 2024-06-03 RX ORDER — ACETAMINOPHEN 325 MG/1
650 TABLET ORAL ONCE
OUTPATIENT
Start: 2024-06-03

## 2024-06-03 RX ORDER — ALBUTEROL SULFATE 0.83 MG/ML
2.5 SOLUTION RESPIRATORY (INHALATION)
OUTPATIENT
Start: 2024-06-03

## 2024-06-03 RX ORDER — HEPARIN SODIUM (PORCINE) LOCK FLUSH IV SOLN 100 UNIT/ML 100 UNIT/ML
5 SOLUTION INTRAVENOUS
OUTPATIENT
Start: 2024-06-03

## 2024-06-03 NOTE — TELEPHONE ENCOUNTER
Pt to change disease-modifying therapy for MS from Tysabri to Ocrevus, because ADRIANA Virus antibody recently came back positive. Confirming infusion location with pt. Has been infusing Tysabri with [facility] in [facility]. Pt will complete pt portion of start form online.     Ocrevus orders pended to Dr Wilson for signature. Dr Wilson, do you need any lab work?    Jerrica Garner RN

## 2024-06-04 DIAGNOSIS — M79.2 NEUROPATHIC PAIN: ICD-10-CM

## 2024-06-04 NOTE — TELEPHONE ENCOUNTER
Dr Wilson clarified that no lab work is needed at this time. Ocrevus orders received from [facility] and placed in Dr Wilson's folder for signature.     Pt's last Tysabri infusion: 4/29/24.     Jerrica Garner RN

## 2024-06-05 RX ORDER — GABAPENTIN 300 MG/1
CAPSULE ORAL
Qty: 180 CAPSULE | Refills: 11 | Status: SHIPPED | OUTPATIENT
Start: 2024-06-05

## 2024-06-05 NOTE — TELEPHONE ENCOUNTER
Prescriber service form faxed to [employer]. Pt has completed patient form online.    Jerrica Garner RN

## 2024-06-05 NOTE — TELEPHONE ENCOUNTER
Received refill request for gabapentin from [facility]; Patient was last seen in May 2024 and has follow up appointment in Nov 2024 with Dr Wilson. Refilled per MS refill protocol.    Jerrica Garner RN

## 2024-06-06 ENCOUNTER — MEDICAL CORRESPONDENCE (OUTPATIENT)
Dept: HEALTH INFORMATION MANAGEMENT | Facility: CLINIC | Age: 24
End: 2024-06-06
Payer: COMMERCIAL

## 2024-06-07 ENCOUNTER — DOCUMENTATION ONLY (OUTPATIENT)
Dept: NEUROLOGY | Facility: CLINIC | Age: 24
End: 2024-06-07
Payer: COMMERCIAL

## 2024-06-07 ENCOUNTER — TRANSFERRED RECORDS (OUTPATIENT)
Dept: HEALTH INFORMATION MANAGEMENT | Facility: CLINIC | Age: 24
End: 2024-06-07
Payer: COMMERCIAL

## 2024-06-07 NOTE — PROGRESS NOTES
Ocrevus prescriber orders have been received from [facility], forms are going to be filled out then placed in Dr. Wilson's folder for review and signature.   Brennan Aguirre EMT June 7, 2024

## 2024-06-11 NOTE — TELEPHONE ENCOUNTER
Ocrevus therapy plan, [facility] order form, 5/20/24 clinic note, Hep B and immunoglobulin lab results, and demographics faxed to [facility]. Faxed to both requested fax numbers ([phone] and [phone]).    Jerrica Garner RN

## 2024-06-17 ENCOUNTER — DOCUMENTATION ONLY (OUTPATIENT)
Dept: NEUROLOGY | Facility: CLINIC | Age: 24
End: 2024-06-17
Payer: COMMERCIAL

## 2024-06-17 NOTE — PROGRESS NOTES
Plan of treatment has been received from [facility], orders have been placed in Dr. Wilson

## 2024-06-21 NOTE — PROGRESS NOTES
Signed plan of treatment for Ocrevus infusions faxed back to [facility] and scanned to chart.    Jerrica Garner RN

## 2024-06-24 ENCOUNTER — DOCUMENTATION ONLY (OUTPATIENT)
Dept: NEUROLOGY | Facility: CLINIC | Age: 24
End: 2024-06-24
Payer: COMMERCIAL

## 2024-06-24 NOTE — PROGRESS NOTES
Physical therapy task orders have been received from [facility], orders placed in Dr. Wilson's folder for review and signature.  Brennan Aguirre EMT June 24, 2024

## 2024-06-26 NOTE — TELEPHONE ENCOUNTER
Clinic Consult:  [facility] Consultation Note    Reason for Consult:  The encounter diagnosis was Family history of colon cancer.    PCP: Aneta Villalobos   [address] [facility] / [place] [address]    HPI:  This is a 53 y.o. female here for evaluation of family history of colon cancer.   She had EGD and colonoscopy in  for abdominal pain.   She was diagnosed with H. Pylori in the past.   She was taking a prebiotic and probiotic in the past and symptoms have resolve.     Sister with colon cancer. Diagnosed in 60s  Mother with ovarian cancer  Brother with laryngeal cancer     Denies nausea, vomiting, abdominal pain, melena, hematochezia, diarrhea or constipation.     ROS:  Review of Systems   Constitutional: Negative for activity change and unexpected weight change.   HENT: Negative for trouble swallowing.    Gastrointestinal: Negative for abdominal distention, abdominal pain, anal bleeding, blood in stool, constipation, diarrhea, nausea, rectal pain and vomiting.           Medical History:   Past Medical History:   Diagnosis Date    Hx of abnormal cervical Pap smear     Hypothyroidism     Osteopenia 2020    Postmenopausal        Surgical History:  Past Surgical History:   Procedure Laterality Date    APPENDECTOMY       SECTION, LOW TRANSVERSE      x 1    COLONOSCOPY N/A 2020    Procedure: COLONOSCOPY;  Surgeon: Alycia Henderson MD;  Location: [facility];  Service: Endoscopy;  Laterality: N/A;    ESOPHAGOGASTRODUODENOSCOPY N/A 2020    Procedure: ESOPHAGOGASTRODUODENOSCOPY (EGD);  Surgeon: Alycia Henderson MD;  Location: [facility];  Service: Endoscopy;  Laterality: N/A;       Family History:   Family History   Problem Relation Age of Onset    No Known Problems Mother     Hypertension Father     Colon cancer Sister     No Known Problems Son     Colon cancer Sister     No Known Problems Sister     No Known Problems Sister     No Known Problems Sister     No Known Problems  Spoke with [facility]. Pt infused initial doses of Ocrevus on 6/13 and 6/24. Pt has follow-up with Dr Wilson on 11/7.    Jerrica Garner RN     "Sister     No Known Problems Sister     Throat cancer Brother         with mets (lung and brain)    No Known Problems Son     Cancer Maternal Aunt     Breast cancer Maternal Aunt     Diabetes Neg Hx     Stroke Neg Hx     Heart disease Neg Hx        Social History:   Social History     Tobacco Use    Smoking status: Never Smoker    Smokeless tobacco: Never Used   Substance Use Topics    Alcohol use: Yes    Drug use: Never       Allergies: Reviewed    Home Medications:   Medication List with Changes/Refills   Current Medications    ASCORBIC ACID, VITAMIN C, (VITAMIN C) 100 MG TABLET    Take 100 mg by mouth once daily.    IPRATROPIUM (ATROVENT) 42 MCG (0.06 %) NASAL SPRAY    SPRAY 2 SPRAYS IN EACH NOSTRIL BY INTRANASAL ROUTE 3 4 TIMES DAILY    MONTELUKAST (SINGULAIR) 10 MG TABLET    Take 10 mg by mouth once daily.    MULTIVITAMIN CAPSULE    Take 1 capsule by mouth once daily.    OSPEMIFENE (OSPHENA) 60 MG TAB    Take 60 mg by mouth once daily.    TRIAMCINOLONE (NASACORT) 55 MCG NASAL INHALER    2 sprays by Nasal route once daily.         Physical Exam:  Vital Signs:  /80   Pulse 78   Ht 5' 5" (1.651 m)   Wt 79 kg (174 lb 2.6 oz)   BMI 28.98 kg/m²   Body mass index is 28.98 kg/m².    Physical Exam  Constitutional:       Appearance: Normal appearance.   Eyes:      Conjunctiva/sclera: Conjunctivae normal.   Abdominal:      General: Abdomen is flat. There is no distension.      Palpations: Abdomen is soft.      Tenderness: There is no abdominal tenderness. There is no guarding.   Neurological:      Mental Status: She is alert.          Labs: Pertinent labs reviewed.        Assessment:  Problem List Items Addressed This Visit        Other    Family history of colon cancer    Current Assessment & Plan     Plan:   -Colonoscopy in 2020 with repeat in 10 years. Will change that to 5 years given family history of colon cancer in a FDR.   -Repeat colonoscopy in 2025   -Agree with genetic counseling refer " made by Dr Patton  -Patient was given educational materials on colon cancer.                Family history of colon cancer  -     Ambulatory referral/consult to Gastroenterology                No follow-ups on file.    Order summary:  No orders of the defined types were placed in this encounter.      Thank you so much for allowing me to participate in the care of Kary Danii Pardo MD  Gastroenterology and Hepatology  [facility]

## 2024-06-27 ENCOUNTER — MEDICAL CORRESPONDENCE (OUTPATIENT)
Dept: HEALTH INFORMATION MANAGEMENT | Facility: CLINIC | Age: 24
End: 2024-06-27
Payer: COMMERCIAL

## 2024-06-28 NOTE — PROGRESS NOTES
Physical therapy task orders have been signed and faxed back at [phone].  Brennan Aguirre EMT June 28, 2024

## 2024-07-02 ENCOUNTER — DOCUMENTATION ONLY (OUTPATIENT)
Dept: NEUROLOGY | Facility: CLINIC | Age: 24
End: 2024-07-02
Payer: COMMERCIAL

## 2024-07-02 NOTE — PROGRESS NOTES
Discontinuation questionnaire for tysabri has been received from [facility], orders placed in Dr. Wilson's folder for review and signature.   Brennan Aguirre EMT July 2, 2024

## 2024-07-12 NOTE — PROGRESS NOTES
Discontinuation for tysabri has been signed and faxed back at [phone].  Brennan Aguirre EMT July 12, 2024

## 2024-09-21 ENCOUNTER — HEALTH MAINTENANCE LETTER (OUTPATIENT)
Age: 24
End: 2024-09-21

## 2024-09-25 ENCOUNTER — DOCUMENTATION ONLY (OUTPATIENT)
Dept: NEUROLOGY | Facility: CLINIC | Age: 24
End: 2024-09-25
Payer: COMMERCIAL

## 2024-09-25 NOTE — PROGRESS NOTES
Authorization for Ocrevus received from [facility], request for office visit from the past 6 months. Office visit notes from 05/09/2024 have been printed and faxed back at [phone].  Brennan Aguirre EMT September 25, 2024

## 2024-10-28 ENCOUNTER — TELEPHONE (OUTPATIENT)
Dept: NEUROLOGY | Facility: CLINIC | Age: 24
End: 2024-10-28
Payer: COMMERCIAL

## 2024-10-28 NOTE — TELEPHONE ENCOUNTER
Left Voicemail (1st Attempt) and Sent Mychart (1st Attempt) for the patient to call back and schedule the following:    Appointment type: Return MS  Provider: Dr. Wilson  Return date: See Below  Specialty phone number: [phone]  Additional appointment(s) needed:   Additonal Notes:     Please manually check the providers sched starting in Jan. Please manually schedule in held dates provided in Jan.

## 2024-10-30 NOTE — TELEPHONE ENCOUNTER
Left Voicemail (2nd Attempt) and Sent Mychart (2nd Attempt) for the patient to call back and schedule the following:    Appointment type: Return MS  Provider: Dr. Wilson  Return date: See Below  Specialty phone number: [phone]  Additional appointment(s) needed:   Additonal Notes:      Please manually check the providers sched starting in Jan. Please manually schedule in held dates provided in Jan. Or schedule next available    Fatmata Haider on 10/30/2024 at 10:16 AM

## 2024-11-18 ENCOUNTER — TELEPHONE (OUTPATIENT)
Dept: CARDIOLOGY | Facility: CLINIC | Age: 24
End: 2024-11-18
Payer: COMMERCIAL

## 2024-11-18 NOTE — TELEPHONE ENCOUNTER
Patient Contacted for the patient to call back and schedule the following:    Appointment type:  rtn ep   Provider: radha  Return date: next available   Specialty phone number: [phone] opt 1   Additional appointment(s) needed: n/a   Additonal Notes: n/a

## 2025-01-20 ENCOUNTER — MEDICAL CORRESPONDENCE (OUTPATIENT)
Dept: HEALTH INFORMATION MANAGEMENT | Facility: CLINIC | Age: 25
End: 2025-01-20
Payer: COMMERCIAL

## 2025-01-21 ENCOUNTER — DOCUMENTATION ONLY (OUTPATIENT)
Dept: NEUROLOGY | Facility: CLINIC | Age: 25
End: 2025-01-21
Payer: COMMERCIAL

## 2025-01-21 NOTE — PROGRESS NOTES
Authorization for Ocrevus has been received from hospitals & [facility], approval valid from 01/20/2025 through 07/18/2025.  Brennan Aguirre EMT January 21, 2025

## 2025-01-27 ENCOUNTER — OFFICE VISIT (OUTPATIENT)
Dept: NEUROLOGY | Facility: CLINIC | Age: 25
End: 2025-01-27
Attending: PSYCHIATRY & NEUROLOGY
Payer: COMMERCIAL

## 2025-01-27 VITALS
DIASTOLIC BLOOD PRESSURE: 71 MMHG | WEIGHT: 166.8 LBS | HEIGHT: 67 IN | OXYGEN SATURATION: 100 % | BODY MASS INDEX: 26.18 KG/M2 | SYSTOLIC BLOOD PRESSURE: 109 MMHG | HEART RATE: 83 BPM

## 2025-01-27 DIAGNOSIS — M79.2 NEUROPATHIC PAIN: ICD-10-CM

## 2025-01-27 DIAGNOSIS — G35 MS (MULTIPLE SCLEROSIS) (H): Primary | ICD-10-CM

## 2025-01-27 DIAGNOSIS — R25.1 TREMOR: ICD-10-CM

## 2025-01-27 PROCEDURE — G2211 COMPLEX E/M VISIT ADD ON: HCPCS | Performed by: PSYCHIATRY & NEUROLOGY

## 2025-01-27 PROCEDURE — G0463 HOSPITAL OUTPT CLINIC VISIT: HCPCS | Performed by: PSYCHIATRY & NEUROLOGY

## 2025-01-27 PROCEDURE — 99213 OFFICE O/P EST LOW 20 MIN: CPT | Performed by: PSYCHIATRY & NEUROLOGY

## 2025-01-27 ASSESSMENT — PAIN SCALES - GENERAL: PAINLEVEL_OUTOF10: NO PAIN (0)

## 2025-01-27 NOTE — PROGRESS NOTES
"Referral source: Established patient    Chief complaint: Multiple sclerosis    History of the Present Illness: Ms. Cindi Ferrer is a 24 year old right-handed woman who presents to the Multiple Sclerosis Clinic today for a scheduled follow up visit regarding her diagnosis of multiple sclerosis.    The patient's history is as per my previous notes.  She initially developed symptoms of demyelinating disease in 2017 when she had horizontal diplopia and left hemibody numbness.  She was admitted to an outside pediatric facility at that time.  MRI scans demonstrated T2 hyperintense lesions suggestive of demyelinating disease of the type seen in multiple sclerosis, and a CSF examination was also positive for oligoclonal bands. She started disease-modifying therapy with natalizumab and did not have further MS relapses on that medication through 2024. We changed her treatment to ocrelizumab in June 2024 due to ADRIANA virus seroconversion. The first maintenance infusion is about one month overdue because of a lapse in insurance, but is now scheduled two days from now.    Today, she denies any new episodic changes in vision, balance, strength, or sensation suggestive of new relapse of multiple sclerosis since she was last seen in this clinic.    Overall, she says that she has been doing \"pretty well\", particularly in comparison to previous tracy. The cold has tended to worsen her fatigue and discomfort in the past.    She graduated from [employer] in December, and is figuring out next steps, including looking into nursing school.    She reports that she has stopped taking oral prescription medications including clonazepam and gabapentin. She ran out of these when her insurance lapsed and at present she does not feel that she needs to be on these drugs.     PHYSICAL EXAMINATION:  VITAL SIGNS: Blood pressure 109/71; pulse 83; weight 75.7 kg; height 1.7 m; oxygen saturation 100%.  GENERAL: Well nourished young " adult woman who presents to the examination alone, awake and alert and in no acute distress.    NEUROLOGIC EXAMINATION:  CRANIAL NERVES: Visual fields are full to confrontation.  Extraocular movements are intact with no internuclear ophthalmoplegia.  Facial strength is normal.  Palate elevation and tongue protrusion are normal.  POWER: Strength is within normal limits in proximal and distal muscles in the upper and lower limbs throughout.  REFLEXES: Reflexes are symmetric and within normal limits in the arms and legs.  MOTOR/CEREBELLAR: There is no appendicular ataxia on finger-to-nose testing.  Rapid alternating movements are within normal limits in the hands and fingers.  There is no pronator drift in the arms.  GAIT: The patient is able to ambulate on a flat, level surface with no gross loss of postural stability, and able to walk on heels, toes and in tandem.    Assessment/plan:    1. Multiple sclerosis  The patient is clinically stable as regards any evidence of active inflammatory demyelination on current disease modifying therapy with ocrelizumab. She will proceed with the first maintenance infusion later this week.    I will see her back in 6 months for a review, with MRI scans of the brain and cervical spine to be performed prior to that visit in order to establish a radiologic baseline on the new medication and to make sure that there is no evidence of active inflammation.    She mentions that she is considering moving to [place], potentially in the sandra. If she gets her infusion in the late summer, she would have several months to work out transition of care before the subsequent treatment was due. We can also work with a local infusion center to facilitate her infusions, as long as they are willing to accept our orders.    2. Tremor  She was taking clonazepam on an as needed basis for tremor, with anxiety as a contributing factor. I think that this is a good time for her to come off of this  medication now that she is done with school. We can re-address at any point in the future, as needed.    3. Neuropathic pain  Similarly, she is not finding that she needs to take gabapentin for pain at present.    The longitudinal plans of care for the diagnoses as documented were addressed during this visit. Due to the added complexity in care, I will continue to support the patient in subsequent management and with ongoing continuity of care.

## 2025-01-27 NOTE — Clinical Note
"1/27/2025       RE: Cindi Ferrer  [address]  [place] [address]     Dear Colleague,    Thank you for referring your patient, Cindi Ferrer, to the [facility] [place] at [facility]. Please see a copy of my visit note below.    Referral source: Established patient    Chief complaint: Multiple sclerosis    History of the Present Illness: Ms. Cindi Ferrer is a 24 year old right-handed woman who presents to the Multiple Sclerosis Clinic today for a scheduled follow up visit regarding her diagnosis of multiple sclerosis.    The patient's history is as per my previous notes.  She initially developed symptoms of demyelinating disease in 2017 when she had horizontal diplopia and left hemibody numbness.  She was admitted to an outside pediatric facility at that time.  MRI scans demonstrated T2 hyperintense lesions suggestive of demyelinating disease of the type seen in multiple sclerosis, and a CSF examination was also positive for oligoclonal bands. She started disease-modifying therapy with natalizumab and did not have further MS relapses on that medication through 2024. We changed her treatment to ocrelizumab in June 2024 due to ADRIANA virus seroconversion. The first maintenance infusion is about one month overdue because of a lapse in insurance, but is now scheduled two days from now.    Today, she denies any new episodic changes in vision, balance, strength, or sensation suggestive of new relapse of multiple sclerosis since she was last seen in this clinic.    Overall, she says that she has been doing \"pretty well\", particularly in comparison to previous tracy. The cold has tended to worsen her fatigue and discomfort in the past.    She graduated from [employer] in December, and is figuring out next steps, including looking into nursing school.    She reports that she has stopped taking oral prescription " medications including clonazepam and gabapentin. She ran out of these when her insurance lapsed and at present she does not feel that she needs to be on these drugs.     PHYSICAL EXAMINATION:  VITAL SIGNS: Blood pressure 109/71; pulse 83; weight 75.7 kg; height 1.7 m; oxygen saturation 100%.  GENERAL: Well nourished young adult woman who presents to the examination alone, awake and alert and in no acute distress.    NEUROLOGIC EXAMINATION:  CRANIAL NERVES: Visual fields are full to confrontation.  Extraocular movements are intact with no internuclear ophthalmoplegia.  Facial strength is normal.  Palate elevation and tongue protrusion are normal.  POWER: Strength is within normal limits in proximal and distal muscles in the upper and lower limbs throughout.  REFLEXES: Reflexes are symmetric and within normal limits in the arms and legs.  MOTOR/CEREBELLAR: There is no appendicular ataxia on finger-to-nose testing.  Rapid alternating movements are within normal limits in the hands and fingers.  There is no pronator drift in the arms.  GAIT: The patient is able to ambulate on a flat, level surface with no gross loss of postural stability, and able to walk on heels, toes and in tandem.    Assessment/plan:    1. Multiple sclerosis  The patient is clinically stable as regards any evidence of active inflammatory demyelination on current disease modifying therapy with ocrelizumab. She will proceed with the first maintenance infusion later this week.    I will see her back in 6 months for a review, with MRI scans of the brain and cervical spine to be performed prior to that visit in order to establish a radiologic baseline on the new medication and to make sure that there is no evidence of active inflammation.    She mentions that she is considering moving to [place], potentially in the sandra. If she gets her infusion in the late summer, she would have several months to work out transition of care before the subsequent  treatment was due. We can also work with a local infusion center to facilitate her infusions, as long as they are willing to accept our orders.    2. Tremor  She was taking clonazepam on an as needed basis for tremor, with anxiety as a contributing factor. I think that this is a good time for her to come off of this medication now that she is done with school. We can re-address at any point in the future, as needed.    3. Neuropathic pain  Similarly, she is not finding that she needs to take gabapentin for pain at present.    The longitudinal plans of care for the diagnoses as documented were addressed during this visit. Due to the added complexity in care, I will continue to support the patient in subsequent management and with ongoing continuity of care.      Again, thank you for allowing me to participate in the care of your patient.      Sincerely,    Coy Wilson MD

## 2025-01-27 NOTE — LETTER
"1/27/2025      RE: Cindi Ferrer  [address]  [place] [address]     Referral source: Established patient    Chief complaint: Multiple sclerosis    History of the Present Illness: Ms. Cindi Ferrer is a 24 year old right-handed woman who presents to the Multiple Sclerosis Clinic today for a scheduled follow up visit regarding her diagnosis of multiple sclerosis.    The patient's history is as per my previous notes.  She initially developed symptoms of demyelinating disease in 2017 when she had horizontal diplopia and left hemibody numbness.  She was admitted to an outside pediatric facility at that time.  MRI scans demonstrated T2 hyperintense lesions suggestive of demyelinating disease of the type seen in multiple sclerosis, and a CSF examination was also positive for oligoclonal bands. She started disease-modifying therapy with natalizumab and did not have further MS relapses on that medication through 2024. We changed her treatment to ocrelizumab in June 2024 due to ADRIANA virus seroconversion. The first maintenance infusion is about one month overdue because of a lapse in insurance, but is now scheduled two days from now.    Today, she denies any new episodic changes in vision, balance, strength, or sensation suggestive of new relapse of multiple sclerosis since she was last seen in this clinic.    Overall, she says that she has been doing \"pretty well\", particularly in comparison to previous tracy. The cold has tended to worsen her fatigue and discomfort in the past.    She graduated from [employer] in December, and is figuring out next steps, including looking into nursing school.    She reports that she has stopped taking oral prescription medications including clonazepam and gabapentin. She ran out of these when her insurance lapsed and at present she does not feel that she needs to be on these drugs.     PHYSICAL EXAMINATION:  VITAL SIGNS: Blood pressure 109/71; pulse 83; weight 75.7 " kg; height 1.7 m; oxygen saturation 100%.  GENERAL: Well nourished young adult woman who presents to the examination alone, awake and alert and in no acute distress.    NEUROLOGIC EXAMINATION:  CRANIAL NERVES: Visual fields are full to confrontation.  Extraocular movements are intact with no internuclear ophthalmoplegia.  Facial strength is normal.  Palate elevation and tongue protrusion are normal.  POWER: Strength is within normal limits in proximal and distal muscles in the upper and lower limbs throughout.  REFLEXES: Reflexes are symmetric and within normal limits in the arms and legs.  MOTOR/CEREBELLAR: There is no appendicular ataxia on finger-to-nose testing.  Rapid alternating movements are within normal limits in the hands and fingers.  There is no pronator drift in the arms.  GAIT: The patient is able to ambulate on a flat, level surface with no gross loss of postural stability, and able to walk on heels, toes and in tandem.    Assessment/plan:    1. Multiple sclerosis  The patient is clinically stable as regards any evidence of active inflammatory demyelination on current disease modifying therapy with ocrelizumab. She will proceed with the first maintenance infusion later this week.    I will see her back in 6 months for a review, with MRI scans of the brain and cervical spine to be performed prior to that visit in order to establish a radiologic baseline on the new medication and to make sure that there is no evidence of active inflammation.    She mentions that she is considering moving to [place], potentially in the [place]. If she gets her infusion in the late summer, she would have several months to work out transition of care before the subsequent treatment was due. We can also work with a local infusion center to facilitate her infusions, as long as they are willing to accept our orders.    2. Tremor  She was taking clonazepam on an as needed basis for tremor, with anxiety as a contributing  factor. I think that this is a good time for her to come off of this medication now that she is done with school. We can re-address at any point in the future, as needed.    3. Neuropathic pain  Similarly, she is not finding that she needs to take gabapentin for pain at present.    The longitudinal plans of care for the diagnoses as documented were addressed during this visit. Due to the added complexity in care, I will continue to support the patient in subsequent management and with ongoing continuity of care.    Coy Wilson MD   of Neurology  [facility]    Cc:  Viviane England MD (PCP)  Patient

## 2025-01-27 NOTE — NURSING NOTE
Chief Complaint   Patient presents with    MS    RECHECK     6 month follow up      Vitals were taken and medications were reconciled.    Brennan Aguirre, EMT  8:07 AM

## 2025-01-27 NOTE — PATIENT INSTRUCTIONS
Proceed with next Ocrevus infusion on 1/31/2025, as scheduled    2.   Return to clinic in 6 months with MRI scans of the brain and cervical spine prior to appointment

## 2025-03-24 NOTE — PROGRESS NOTES
ELECTROPHYSIOLOGY CLINIC VISIT    Assessment/Recommendations   Assessment/Plan:    Ms. Ferrer is a 23 year old female with past medical history significant for multiple sclerosis, migraines, anxiety and anemia.     Syncope:   - Use compression shorts (athletic compression shorts)  - Increase hydration with goal to take in 64 oz of water/electrolyte fluids per day.   - Liberalize salt/electrolytes intake  - Change positions carefully and slowly and maintain safe environment.   -Standing training and supine isometric exercises.       Follow up in 2 years or sooner if need arises.        History of Present Illness/Subjective    Ms. Cindi Ferrer is a 24 year old female who comes in today for EP follow-up of syncope.    Ms. Ferrer is a 23 year old female with past medical history significant for multiple sclerosis, migraines, anxiety and anemia. She has followed with neurology, in spring 2023 she began having episodes of loss of consciousness. EEG testing was ordered and completed while patient was admitted from April 28 to May 2, 2023. Events were captured during this time and felt consistent with nonepileptic events. An MRI scan of the brain was also performed during that hospitalization did not demonstrate any evidence of active or interval demyelination. 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 She had echo completed 7/2023 without any abnormalities noted. She completed 30 day holter monitor in April 2023, with sinus , average 74 bpm. Symptom triggers related to sinus rhythm. She was evaluated by Dr Chiu 10/16/23, he recommended she complete tilt study for further evaluation of symptoms. 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 She followed up with Dr. Cooper in 3/2024 and was doing well without recurrent symptoms.     She presents today for follow up. She reports feeling well overall. She had an episode this morning of tachycardia, dizziness, and nausea. She is feeling better now. She otherwise has rare dizziness. She focuses well on staying hydrated and salt/electrolyte intake. She denies chest discomfort, palpitations, abdominal fullness/bloating or peripheral edema, shortness of breath, paroxysmal nocturnal dyspnea, orthopnea, lightheadedness, dizziness, pre-syncope, or syncope. No current cardiac medications.         I have reviewed and updated the patient's Past Medical History, Social History, Family History and Medication List.     Cardiographics (Personally Reviewed) :   12/2023 Tilt:  Autonomic studies were normal.  Patient did have symptoms suggestive of a vasovagal reaction following nitroglycerin.  She recalls similar symptoms on other occasions.  She says she does not usually faint but under those conditions would sit down.  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  Once recovered patient was perfectly lucid.       Physical Examination   /73 (BP Location: Right arm, Patient Position: Chair, Cuff Size: Adult Regular)   Pulse 68   Wt 75.8 kg (167 lb)   SpO2 100%   BMI 26.16 kg/m    Wt Readings from Last 3 Encounters:   01/27/25 75.7 kg (166 lb 12.8 oz)   05/09/24 77.5 kg (170 lb 12.8 oz)   03/14/24 79.4 kg (175 lb)     CONSITUTIONAL: no acute distress  HEENT: no icterus, no redness or discharge, neck supple  CV: no visible edema of visualized extremities. No JVD.   RESPIRATORY: respirations nonlabored, no cough  NEURO: AA&Ox3, speech fluent/appropriate, motor grossly nonfocal  PSYCH: cooperative, affect appropriate  DERM: no rashes on  visualized face/neck/upper extremities         Medications  Allergies   Current Outpatient Medications   Medication Sig Dispense Refill    clonazePAM (KLONOPIN) 0.5 MG tablet TAKE 1 TO 2 TABLETS BY MOUTH AT NIGHT AS NEEDED FOR TREMORS (Patient not taking: Reported on 1/27/2025) 60 tablet 1    ferrous sulfate (FEROSUL) 325 (65 Fe) MG tablet Take 325 mg by mouth (Patient not taking: Reported on 1/27/2025)      gabapentin (NEURONTIN) 300 MG capsule TAKE UP TO 2 CAPSULES BY MOUTH THREE TIMES DAILY (Patient not taking: Reported on 1/27/2025) 180 capsule 11    galcanezumab-gnlm (EMGALITY) 120 MG/ML injection inject 120 mg subcutaneous every 28 days (Patient not taking: Reported on 1/27/2025) 1 mL 12    levonorgestrel-ethinyl estradiol (AVIANE) 0.1-20 MG-MCG tablet Take 1 tablet by mouth daily (Patient not taking: Reported on 1/27/2025)      modafinil (PROVIGIL) 200 MG tablet TAKE 1/2 (ONE-HALF) TABLET BY MOUTH TWICE DAILY AS NEEDED FOR  FATIGUE  IN  THE  MORNING  AND  EARLY  AFTERNOON (Patient not taking: Reported on 1/27/2025) 30 tablet 5    natalizumab (TYSABRI) 300 MG/15ML injection Inject 300 mg into the vein once every six weeks (Patient not taking: Reported on 1/27/2025)      order for DME Equipment being ordered: Wheelchair (manual) (Patient not taking: Reported on 1/27/2025) 1 Device 0    prochlorperazine (COMPAZINE) 5 MG tablet Take 1-2 tablets (5-10 mg) by mouth every 6 hours as needed for nausea or vomiting (Patient not taking: Reported on 1/27/2025) 20 tablet 3    SUMAtriptan (IMITREX) 50 MG tablet Take 1-2 tablets ( mg) by mouth at onset of headache for migraine May repeat in 2 hours. Max 4 tablets/24 hours. (Patient not taking: Reported on 1/27/2025) 12 tablet 9    vitamin D3 (CHOLECALCIFEROL) 50 mcg (2000 units) tablet Take 1 tablet (50 mcg) by mouth daily (Patient not taking: Reported on 1/27/2025) 90 tablet 3    No Known Allergies      Lab Results (Personally Reviewed)    Chemistry/lipid CBC Cardiac  "Enzymes/BNP/TSH/INR   Lab Results   Component Value Date    BUN 6 (L) 10/05/2017     10/05/2017    CO2 27 10/05/2017     Creatinine   Date Value Ref Range Status   10/05/2017 0.54 0.50 - 1.00 mg/dL Final       No results found for: \"CHOL\", \"HDL\", \"LDL\", \"CHOLHDL\"   Lab Results   Component Value Date    WBC 8.6 05/09/2024    HGB 12.6 05/09/2024    HCT 39.2 05/09/2024    MCV 86 05/09/2024     05/09/2024    No results found for: \"CKTOTAL\", \"CKMB\", \"TROPONINI\", \"BNP\", \"TSH\", \"INR\"     The patient states understanding and is agreeable with the plan.   EDISON Pennington CNP  Electrophysiology Consult Service  Securely message with [employer]   Text page via [facility] Paging/Directory                "

## 2025-03-26 ENCOUNTER — OFFICE VISIT (OUTPATIENT)
Dept: CARDIOLOGY | Facility: CLINIC | Age: 25
End: 2025-03-26
Attending: INTERNAL MEDICINE
Payer: COMMERCIAL

## 2025-03-26 VITALS
DIASTOLIC BLOOD PRESSURE: 73 MMHG | HEART RATE: 68 BPM | WEIGHT: 167 LBS | OXYGEN SATURATION: 100 % | SYSTOLIC BLOOD PRESSURE: 114 MMHG | BODY MASS INDEX: 26.16 KG/M2

## 2025-03-26 DIAGNOSIS — R55 SYNCOPE AND COLLAPSE: ICD-10-CM

## 2025-03-26 PROCEDURE — G0463 HOSPITAL OUTPT CLINIC VISIT: HCPCS | Performed by: NURSE PRACTITIONER

## 2025-03-26 ASSESSMENT — PAIN SCALES - GENERAL: PAINLEVEL_OUTOF10: NO PAIN (0)

## 2025-03-26 NOTE — NURSING NOTE
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    Karan Zuleta, EMT    10:01 AM

## 2025-03-26 NOTE — LETTER
3/26/2025      RE: Cindi Ferrer  [address]  [place] [address]       Dear Colleague,    Thank you for the opportunity to participate in the care of your patient, Cindi Ferrer, at the [facility] [place] [facility]. Please see a copy of my visit note below.        ELECTROPHYSIOLOGY CLINIC VISIT    Assessment/Recommendations   Assessment/Plan:    Ms. Ferrer is a 23 year old female with past medical history significant for multiple sclerosis, migraines, anxiety and anemia.     Syncope:   - Use compression shorts (athletic compression shorts)  - Increase hydration with goal to take in 64 oz of water/electrolyte fluids per day.   - Liberalize salt/electrolytes intake  - Change positions carefully and slowly and maintain safe environment.   -Standing training and supine isometric exercises.       Follow up in 2 years or sooner if need arises.        History of Present Illness/Subjective    Ms. Cindi Ferrer is a 24 year old female who comes in today for EP follow-up of syncope.    Ms. Ferrer is a 23 year old female with past medical history significant for multiple sclerosis, migraines, anxiety and anemia. She has followed with neurology, in spring 2023 she began having episodes of loss of consciousness. EEG testing was ordered and completed while patient was admitted from April 28 to May 2, 2023. Events were captured during this time and felt consistent with nonepileptic events. An MRI scan of the brain was also performed during that hospitalization did not demonstrate any evidence of active or interval demyelination. 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 She had echo completed 7/2023 without any abnormalities noted. She completed 30 day holter monitor in April 2023, with sinus , average 74 bpm. Symptom triggers related to sinus rhythm.  She was evaluated by Dr Chiu 10/16/23, he recommended she complete tilt study for further evaluation of symptoms. 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 She followed up with Dr. Cooper in 3/2024 and was doing well without recurrent symptoms.     She presents today for follow up. She reports feeling well overall. She had an episode this morning of tachycardia, dizziness, and nausea. She is feeling better now. She otherwise has rare dizziness. She focuses well on staying hydrated and salt/electrolyte intake. She denies chest discomfort, palpitations, abdominal fullness/bloating or peripheral edema, shortness of breath, paroxysmal nocturnal dyspnea, orthopnea, lightheadedness, dizziness, pre-syncope, or syncope. No current cardiac medications.         I have reviewed and updated the patient's Past Medical History, Social History, Family History and Medication List.     Cardiographics (Personally Reviewed) :   12/2023 Tilt:  Autonomic studies were normal.  Patient did have symptoms suggestive of a vasovagal reaction following nitroglycerin.  She recalls similar symptoms on other occasions.  She says she does not usually faint but under those conditions would sit down.  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  Once recovered patient was perfectly lucid.       Physical Examination   /73 (BP Location: Right arm, Patient Position: Chair, Cuff Size: Adult Regular)   Pulse 68   Wt 75.8 kg (167 lb)   SpO2 100%   BMI 26.16 kg/m    Wt Readings from Last 3 Encounters:   01/27/25 75.7 kg (166 lb 12.8 oz)   05/09/24 77.5 kg (170 lb 12.8 oz)   03/14/24 79.4  kg (175 lb)     CONSITUTIONAL: no acute distress  HEENT: no icterus, no redness or discharge, neck supple  CV: no visible edema of visualized extremities. No JVD.   RESPIRATORY: respirations nonlabored, no cough  NEURO: AA&Ox3, speech fluent/appropriate, motor grossly nonfocal  PSYCH: cooperative, affect appropriate  DERM: no rashes on visualized face/neck/upper extremities         Medications  Allergies   Current Outpatient Medications   Medication Sig Dispense Refill     clonazePAM (KLONOPIN) 0.5 MG tablet TAKE 1 TO 2 TABLETS BY MOUTH AT NIGHT AS NEEDED FOR TREMORS (Patient not taking: Reported on 1/27/2025) 60 tablet 1     ferrous sulfate (FEROSUL) 325 (65 Fe) MG tablet Take 325 mg by mouth (Patient not taking: Reported on 1/27/2025)       gabapentin (NEURONTIN) 300 MG capsule TAKE UP TO 2 CAPSULES BY MOUTH THREE TIMES DAILY (Patient not taking: Reported on 1/27/2025) 180 capsule 11     galcanezumab-gnlm (EMGALITY) 120 MG/ML injection inject 120 mg subcutaneous every 28 days (Patient not taking: Reported on 1/27/2025) 1 mL 12     levonorgestrel-ethinyl estradiol (AVIANE) 0.1-20 MG-MCG tablet Take 1 tablet by mouth daily (Patient not taking: Reported on 1/27/2025)       modafinil (PROVIGIL) 200 MG tablet TAKE 1/2 (ONE-HALF) TABLET BY MOUTH TWICE DAILY AS NEEDED FOR  FATIGUE  IN  THE  MORNING  AND  EARLY  AFTERNOON (Patient not taking: Reported on 1/27/2025) 30 tablet 5     natalizumab (TYSABRI) 300 MG/15ML injection Inject 300 mg into the vein once every six weeks (Patient not taking: Reported on 1/27/2025)       order for DME Equipment being ordered: Wheelchair (manual) (Patient not taking: Reported on 1/27/2025) 1 Device 0     prochlorperazine (COMPAZINE) 5 MG tablet Take 1-2 tablets (5-10 mg) by mouth every 6 hours as needed for nausea or vomiting (Patient not taking: Reported on 1/27/2025) 20 tablet 3     SUMAtriptan (IMITREX) 50 MG tablet Take 1-2 tablets ( mg) by mouth at onset of headache for migraine  "May repeat in 2 hours. Max 4 tablets/24 hours. (Patient not taking: Reported on 1/27/2025) 12 tablet 9     vitamin D3 (CHOLECALCIFEROL) 50 mcg (2000 units) tablet Take 1 tablet (50 mcg) by mouth daily (Patient not taking: Reported on 1/27/2025) 90 tablet 3    No Known Allergies      Lab Results (Personally Reviewed)    Chemistry/lipid CBC Cardiac Enzymes/BNP/TSH/INR   Lab Results   Component Value Date    BUN 6 (L) 10/05/2017     10/05/2017    CO2 27 10/05/2017     Creatinine   Date Value Ref Range Status   10/05/2017 0.54 0.50 - 1.00 mg/dL Final       No results found for: \"CHOL\", \"HDL\", \"LDL\", \"CHOLHDL\"   Lab Results   Component Value Date    WBC 8.6 05/09/2024    HGB 12.6 05/09/2024    HCT 39.2 05/09/2024    MCV 86 05/09/2024     05/09/2024    No results found for: \"CKTOTAL\", \"CKMB\", \"TROPONINI\", \"BNP\", \"TSH\", \"INR\"     The patient states understanding and is agreeable with the plan.   EDISON Pennington CNP  Electrophysiology Consult Service  Securely message with [employer]   Text page via [facility] Paging/Directory                  Please do not hesitate to contact me if you have any questions/concerns.     Sincerely,     EDISON Snyder CNP  "

## 2025-06-27 ENCOUNTER — APPOINTMENT (OUTPATIENT)
Dept: MRI IMAGING | Facility: CLINIC | Age: 25
End: 2025-06-27
Attending: EMERGENCY MEDICINE
Payer: COMMERCIAL

## 2025-06-27 ENCOUNTER — HOSPITAL ENCOUNTER (OUTPATIENT)
Facility: CLINIC | Age: 25
Setting detail: OBSERVATION
Discharge: HOME | End: 2025-06-29
Attending: EMERGENCY MEDICINE | Admitting: PEDIATRICS
Payer: COMMERCIAL

## 2025-06-27 DIAGNOSIS — G35 MULTIPLE SCLEROSIS EXACERBATION (H): ICD-10-CM

## 2025-06-27 DIAGNOSIS — R33.8 ACUTE URINARY RETENTION: ICD-10-CM

## 2025-06-27 LAB
ALBUMIN SERPL BCG-MCNC: 4.5 G/DL (ref 3.5–5.2)
ALBUMIN UR-MCNC: NEGATIVE MG/DL
ALP SERPL-CCNC: 79 U/L (ref 40–150)
ALT SERPL W P-5'-P-CCNC: 18 U/L (ref 0–50)
ANION GAP SERPL CALCULATED.3IONS-SCNC: 13 MMOL/L (ref 7–15)
APPEARANCE UR: CLEAR
AST SERPL W P-5'-P-CCNC: 19 U/L (ref 0–45)
BASOPHILS # BLD AUTO: 0 10E3/UL (ref 0–0.2)
BASOPHILS NFR BLD AUTO: 0 %
BILIRUB SERPL-MCNC: 0.3 MG/DL
BILIRUB UR QL STRIP: NEGATIVE
BUN SERPL-MCNC: 11.4 MG/DL (ref 6–20)
CALCIUM SERPL-MCNC: 9.3 MG/DL (ref 8.8–10.4)
CHLORIDE SERPL-SCNC: 101 MMOL/L (ref 98–107)
COLOR UR AUTO: ABNORMAL
CREAT SERPL-MCNC: 0.73 MG/DL (ref 0.51–0.95)
EGFRCR SERPLBLD CKD-EPI 2021: >90 ML/MIN/1.73M2
EOSINOPHIL # BLD AUTO: 0.4 10E3/UL (ref 0–0.7)
EOSINOPHIL NFR BLD AUTO: 3 %
ERYTHROCYTE [DISTWIDTH] IN BLOOD BY AUTOMATED COUNT: 16.6 % (ref 10–15)
GLUCOSE BLDC GLUCOMTR-MCNC: 107 MG/DL (ref 70–99)
GLUCOSE SERPL-MCNC: 95 MG/DL (ref 70–99)
GLUCOSE UR STRIP-MCNC: NEGATIVE MG/DL
HCG SERPL QL: NEGATIVE
HCO3 SERPL-SCNC: 21 MMOL/L (ref 22–29)
HCT VFR BLD AUTO: 37.1 % (ref 35–47)
HGB BLD-MCNC: 12.2 G/DL (ref 11.7–15.7)
HGB UR QL STRIP: NEGATIVE
IMM GRANULOCYTES # BLD: 0 10E3/UL
IMM GRANULOCYTES NFR BLD: 0 %
INR PPP: 1.03 (ref 0.85–1.15)
KETONES UR STRIP-MCNC: NEGATIVE MG/DL
LEUKOCYTE ESTERASE UR QL STRIP: NEGATIVE
LYMPHOCYTES # BLD AUTO: 4.1 10E3/UL (ref 0.8–5.3)
LYMPHOCYTES NFR BLD AUTO: 38 %
MCH RBC QN AUTO: 26.9 PG (ref 26.5–33)
MCHC RBC AUTO-ENTMCNC: 32.9 G/DL (ref 31.5–36.5)
MCV RBC AUTO: 82 FL (ref 78–100)
MONOCYTES # BLD AUTO: 0.8 10E3/UL (ref 0–1.3)
MONOCYTES NFR BLD AUTO: 7 %
MUCOUS THREADS #/AREA URNS LPF: PRESENT /LPF
NEUTROPHILS # BLD AUTO: 5.5 10E3/UL (ref 1.6–8.3)
NEUTROPHILS NFR BLD AUTO: 51 %
NITRATE UR QL: NEGATIVE
NRBC # BLD AUTO: 0 10E3/UL
NRBC BLD AUTO-RTO: 0 /100
PH UR STRIP: 7 [PH] (ref 5–7)
PLATELET # BLD AUTO: 317 10E3/UL (ref 150–450)
POTASSIUM SERPL-SCNC: 3.7 MMOL/L (ref 3.4–5.3)
PROT SERPL-MCNC: 7.4 G/DL (ref 6.4–8.3)
PROTHROMBIN TIME: 13.5 SECONDS (ref 11.8–14.8)
RBC # BLD AUTO: 4.54 10E6/UL (ref 3.8–5.2)
RBC URINE: 0 /HPF
SODIUM SERPL-SCNC: 135 MMOL/L (ref 135–145)
SP GR UR STRIP: 1.01 (ref 1–1.03)
UROBILINOGEN UR STRIP-MCNC: NORMAL MG/DL
WBC # BLD AUTO: 10.8 10E3/UL (ref 4–11)
WBC URINE: <1 /HPF

## 2025-06-27 PROCEDURE — 72156 MRI NECK SPINE W/O & W/DYE: CPT

## 2025-06-27 PROCEDURE — 99203 OFFICE O/P NEW LOW 30 MIN: CPT

## 2025-06-27 PROCEDURE — 85610 PROTHROMBIN TIME: CPT | Performed by: PHYSICIAN ASSISTANT

## 2025-06-27 PROCEDURE — 36415 COLL VENOUS BLD VENIPUNCTURE: CPT | Performed by: PHYSICIAN ASSISTANT

## 2025-06-27 PROCEDURE — A9585 GADOBUTROL INJECTION: HCPCS | Performed by: EMERGENCY MEDICINE

## 2025-06-27 PROCEDURE — G0378 HOSPITAL OBSERVATION PER HR: HCPCS

## 2025-06-27 PROCEDURE — 82962 GLUCOSE BLOOD TEST: CPT

## 2025-06-27 PROCEDURE — 250N000013 HC RX MED GY IP 250 OP 250 PS 637: Performed by: EMERGENCY MEDICINE

## 2025-06-27 PROCEDURE — 72157 MRI CHEST SPINE W/O & W/DYE: CPT

## 2025-06-27 PROCEDURE — 250N000013 HC RX MED GY IP 250 OP 250 PS 637: Performed by: STUDENT IN AN ORGANIZED HEALTH CARE EDUCATION/TRAINING PROGRAM

## 2025-06-27 PROCEDURE — 99207 PR APP CREDIT; MD BILLING SHARED VISIT: CPT | Performed by: PHYSICIAN ASSISTANT

## 2025-06-27 PROCEDURE — 99222 1ST HOSP IP/OBS MODERATE 55: CPT | Mod: FS | Performed by: PEDIATRICS

## 2025-06-27 PROCEDURE — 70543 MRI ORBT/FAC/NCK W/O &W/DYE: CPT | Mod: 26 | Performed by: RADIOLOGY

## 2025-06-27 PROCEDURE — 70553 MRI BRAIN STEM W/O & W/DYE: CPT

## 2025-06-27 PROCEDURE — 76705 ECHO EXAM OF ABDOMEN: CPT | Mod: 26 | Performed by: STUDENT IN AN ORGANIZED HEALTH CARE EDUCATION/TRAINING PROGRAM

## 2025-06-27 PROCEDURE — 255N000002 HC RX 255 OP 636: Performed by: EMERGENCY MEDICINE

## 2025-06-27 PROCEDURE — 72156 MRI NECK SPINE W/O & W/DYE: CPT | Mod: 26 | Performed by: RADIOLOGY

## 2025-06-27 PROCEDURE — 51798 US URINE CAPACITY MEASURE: CPT | Performed by: EMERGENCY MEDICINE

## 2025-06-27 PROCEDURE — 84703 CHORIONIC GONADOTROPIN ASSAY: CPT | Performed by: EMERGENCY MEDICINE

## 2025-06-27 PROCEDURE — 250N000013 HC RX MED GY IP 250 OP 250 PS 637: Performed by: PHYSICIAN ASSISTANT

## 2025-06-27 PROCEDURE — 85004 AUTOMATED DIFF WBC COUNT: CPT | Performed by: EMERGENCY MEDICINE

## 2025-06-27 PROCEDURE — 99285 EMERGENCY DEPT VISIT HI MDM: CPT | Mod: 25

## 2025-06-27 PROCEDURE — 72157 MRI CHEST SPINE W/O & W/DYE: CPT | Mod: 26 | Performed by: RADIOLOGY

## 2025-06-27 PROCEDURE — 85025 COMPLETE CBC W/AUTO DIFF WBC: CPT | Performed by: EMERGENCY MEDICINE

## 2025-06-27 PROCEDURE — 70553 MRI BRAIN STEM W/O & W/DYE: CPT | Mod: 26 | Performed by: RADIOLOGY

## 2025-06-27 PROCEDURE — 36415 COLL VENOUS BLD VENIPUNCTURE: CPT | Performed by: EMERGENCY MEDICINE

## 2025-06-27 PROCEDURE — 99222 1ST HOSP IP/OBS MODERATE 55: CPT | Mod: GC | Performed by: STUDENT IN AN ORGANIZED HEALTH CARE EDUCATION/TRAINING PROGRAM

## 2025-06-27 PROCEDURE — 99285 EMERGENCY DEPT VISIT HI MDM: CPT | Performed by: EMERGENCY MEDICINE

## 2025-06-27 PROCEDURE — 84155 ASSAY OF PROTEIN SERUM: CPT | Performed by: EMERGENCY MEDICINE

## 2025-06-27 PROCEDURE — 84132 ASSAY OF SERUM POTASSIUM: CPT | Performed by: EMERGENCY MEDICINE

## 2025-06-27 PROCEDURE — 81001 URINALYSIS AUTO W/SCOPE: CPT | Performed by: EMERGENCY MEDICINE

## 2025-06-27 RX ORDER — CLONAZEPAM 1 MG/1
1 TABLET ORAL 2 TIMES DAILY
COMMUNITY

## 2025-06-27 RX ORDER — ONDANSETRON 4 MG/1
4 TABLET, ORALLY DISINTEGRATING ORAL EVERY 6 HOURS PRN
Status: DISCONTINUED | OUTPATIENT
Start: 2025-06-27 | End: 2025-06-29 | Stop reason: HOSPADM

## 2025-06-27 RX ORDER — OLANZAPINE 2.5 MG/1
5 TABLET, FILM COATED ORAL EVERY MORNING
Status: DISCONTINUED | OUTPATIENT
Start: 2025-06-28 | End: 2025-06-28

## 2025-06-27 RX ORDER — CLONAZEPAM 0.5 MG/1
1 TABLET ORAL ONCE
Status: COMPLETED | OUTPATIENT
Start: 2025-06-27 | End: 2025-06-27

## 2025-06-27 RX ORDER — ONDANSETRON 2 MG/ML
4 INJECTION INTRAMUSCULAR; INTRAVENOUS EVERY 6 HOURS PRN
Status: DISCONTINUED | OUTPATIENT
Start: 2025-06-27 | End: 2025-06-29 | Stop reason: HOSPADM

## 2025-06-27 RX ORDER — LORAZEPAM 1 MG/1
1 TABLET ORAL
Status: COMPLETED | OUTPATIENT
Start: 2025-06-27 | End: 2025-06-28

## 2025-06-27 RX ORDER — IVABRADINE 5 MG/1
5 TABLET, FILM COATED ORAL 2 TIMES DAILY WITH MEALS
Status: DISCONTINUED | OUTPATIENT
Start: 2025-06-27 | End: 2025-06-29 | Stop reason: HOSPADM

## 2025-06-27 RX ORDER — OLANZAPINE 5 MG/1
5 TABLET, FILM COATED ORAL EVERY MORNING
COMMUNITY

## 2025-06-27 RX ORDER — GABAPENTIN 100 MG/1
100 CAPSULE ORAL 3 TIMES DAILY
COMMUNITY

## 2025-06-27 RX ORDER — GABAPENTIN 100 MG/1
100 CAPSULE ORAL 3 TIMES DAILY
Status: DISCONTINUED | OUTPATIENT
Start: 2025-06-27 | End: 2025-06-29 | Stop reason: HOSPADM

## 2025-06-27 RX ORDER — IVABRADINE 5 MG/1
5 TABLET, FILM COATED ORAL ONCE
Status: COMPLETED | OUTPATIENT
Start: 2025-06-27 | End: 2025-06-27

## 2025-06-27 RX ORDER — LORAZEPAM 1 MG/1
1 TABLET ORAL ONCE
Status: COMPLETED | OUTPATIENT
Start: 2025-06-27 | End: 2025-06-27

## 2025-06-27 RX ORDER — ONDANSETRON 4 MG/1
4 TABLET, ORALLY DISINTEGRATING ORAL EVERY 8 HOURS PRN
COMMUNITY

## 2025-06-27 RX ORDER — GABAPENTIN 100 MG/1
100 CAPSULE ORAL ONCE
Status: COMPLETED | OUTPATIENT
Start: 2025-06-27 | End: 2025-06-27

## 2025-06-27 RX ORDER — CLONAZEPAM 1 MG/1
1 TABLET ORAL 2 TIMES DAILY
Status: DISCONTINUED | OUTPATIENT
Start: 2025-06-27 | End: 2025-06-29 | Stop reason: HOSPADM

## 2025-06-27 RX ORDER — GADOBUTROL 604.72 MG/ML
7.5 INJECTION INTRAVENOUS ONCE
Status: COMPLETED | OUTPATIENT
Start: 2025-06-27 | End: 2025-06-27

## 2025-06-27 RX ORDER — ACETAMINOPHEN 325 MG/1
650 TABLET ORAL EVERY 4 HOURS PRN
Status: DISCONTINUED | OUTPATIENT
Start: 2025-06-27 | End: 2025-06-29 | Stop reason: HOSPADM

## 2025-06-27 RX ADMIN — GABAPENTIN 100 MG: 100 CAPSULE ORAL at 09:38

## 2025-06-27 RX ADMIN — GADOBUTROL 7.5 ML: 604.72 INJECTION INTRAVENOUS at 05:22

## 2025-06-27 RX ADMIN — GABAPENTIN 100 MG: 100 CAPSULE ORAL at 14:37

## 2025-06-27 RX ADMIN — IVABRADINE 5 MG: 5 TABLET, FILM COATED ORAL at 11:11

## 2025-06-27 RX ADMIN — CLONAZEPAM 1 MG: 0.5 TABLET ORAL at 09:38

## 2025-06-27 RX ADMIN — CLONAZEPAM 1 MG: 1 TABLET ORAL at 20:25

## 2025-06-27 RX ADMIN — GABAPENTIN 100 MG: 100 CAPSULE ORAL at 20:25

## 2025-06-27 RX ADMIN — LORAZEPAM 1 MG: 1 TABLET ORAL at 03:06

## 2025-06-27 RX ADMIN — IVABRADINE 5 MG: 5 TABLET, FILM COATED ORAL at 18:38

## 2025-06-27 ASSESSMENT — ACTIVITIES OF DAILY LIVING (ADL)
ADLS_ACUITY_SCORE: 58
ADLS_ACUITY_SCORE: 60
ADLS_ACUITY_SCORE: 42
ADLS_ACUITY_SCORE: 42
ADLS_ACUITY_SCORE: 60
ADLS_ACUITY_SCORE: 42
ADLS_ACUITY_SCORE: 58
ADLS_ACUITY_SCORE: 60
ADLS_ACUITY_SCORE: 58
ADLS_ACUITY_SCORE: 60
ADLS_ACUITY_SCORE: 42
ADLS_ACUITY_SCORE: 60
ADLS_ACUITY_SCORE: 42
ADLS_ACUITY_SCORE: 60
ADLS_ACUITY_SCORE: 60

## 2025-06-27 ASSESSMENT — COLUMBIA-SUICIDE SEVERITY RATING SCALE - C-SSRS
6. HAVE YOU EVER DONE ANYTHING, STARTED TO DO ANYTHING, OR PREPARED TO DO ANYTHING TO END YOUR LIFE?: NO
1. IN THE PAST MONTH, HAVE YOU WISHED YOU WERE DEAD OR WISHED YOU COULD GO TO SLEEP AND NOT WAKE UP?: NO
2. HAVE YOU ACTUALLY HAD ANY THOUGHTS OF KILLING YOURSELF IN THE PAST MONTH?: NO

## 2025-06-27 NOTE — H&P
[facility]    History and Physical - Hospitalist Service       Date of Admission:  6/27/2025    Assessment & Plan      Cindi Ferrer is a 24 year old woman with a history relapsing remitting multiple sclerosis, executive function deficit, depression, anxiety, PTSD, psychosis, who was admitted to Medicine Observation with urinary retention.     Acute urinary retention  Multiple sclerosis  Relapsing remitting MS, diagnosed 8 years ago. On ocrelizumab, most recent dose in January. Recently admitted to OSH on 6/20/25 for BUE paresthesias, back pain, and urinary incontinence with CT concerning for possible active demyelination at C5-6 and was treated with steroids. She now presented with left sided weakness and urinary retention. Last urinated spontaneously yesterday evening.    - Neurology consulted, no evidence of acute MS flare at this time   - Urology consult   - LP to rule-out infectious etiology. CSF labs ordered: glucose, protein, ADRIANA virus, meningitis/encephalitis panel. Freeze tube of CSF for potential additional testing. CAPS team consulted.    - Lumbar MRI to rule-out other spinal pathology   - Straight cath q6h and PRN   - I/Os    Depression  Anxiety  PTSD  Continue PTA regimen of Zyprexa, clonazepam, gabapentin.           Diet:  Regular  DVT Prophylaxis: Low Risk/Ambulatory with no VTE prophylaxis indicated  Reza Catheter: Not present  Lines: None     Cardiac Monitoring: None  Code Status:  FULL    Clinically Significant Risk Factors Present on Admission                                        Disposition Plan     Medically Ready for Discharge: Anticipated Tomorrow         The patient's care was discussed with the Attending Physician, Dr. Montelongo, Patient, and Patient's Family.    Roseanna Wahl PA-C  Hospitalist Service  [facility]  Securely message with Voc[employer] (more info)  Text page via [employer] Paging/Directory      ______________________________________________________________________    Chief Complaint   Urinary retention    History is obtained from the patient    History of Present Illness   Cindi Ferrer is a 24 year old woman with a history relapsing remitting multiple sclerosis, executive function deficit, depression, anxiety, PTSD, psychosis, who was admitted to Medicine Observation with urinary retention. She was recently hospitalized at [facility] for possible MS flare after presenting with bilateral upper extremity weakness and tingling.  On the chart she slightly hypertensive cervical MRI was concerning for possible demyelinating lesions at the level of C5-C6.  She was treated with 3 days of IV Solu-Medrol and was discharged on a steroid taper.  Around the same time she was having some issues with urinary incontinence and again had some incontinence issues yesterday.  She voided spontaneously on her own around 5 PM last night.  Since then she has been unable to void and has a sensation of her bladder feeling full.  She was bladder scanned in the ER which revealed 1000 mL of urine and was straight cathed.  She still is unable to void on her own.    She is also noting some lower extremity weakness and radiculopathy type shooting pain that starts in her buttocks and radiates down the back of her legs.  The shooting pain down the back of her legs is provoked by movement.     Urinalysis in the ED was negative for acute infection.  MRI of the brain, cervical spine, thoracic spine were all unrevealing and negative for any acute demyelinating lesions.    Past Medical History    Past Medical History:   Diagnosis Date    Multiple sclerosis (H) 09/2017       Past Surgical History   Past Surgical History:   Procedure Laterality Date    EP STUDY TILT TABLE N/A 12/29/2023    Procedure: Tilt Table Study;  Surgeon: Javi Cooper MD;  Location:  HEART CARDIAC CATH LAB       Prior to Admission Medications   Prior  to Admission Medications   Prescriptions Last Dose Informant Patient Reported? Taking?   OLANZapine (ZYPREXA) 5 MG tablet   Yes Yes   Sig: Take 5 mg by mouth every morning.   clonazePAM (KLONOPIN) 1 MG tablet 6/26/2025 Evening  Yes Yes   Sig: Take 1 mg by mouth 2 times daily.   ferrous sulfate (FEROSUL) 325 (65 Fe) MG tablet 6/26/2025 Self Yes Yes   Sig: Take 325 mg by mouth daily.   gabapentin (NEURONTIN) 100 MG capsule 6/26/2025 Evening  Yes Yes   Sig: Take 100 mg by mouth 3 times daily.   ivabradine (CORLANOR) 5 MG tablet 6/26/2025 Evening  No Yes   Sig: Take 1 tablet (5 mg) by mouth 2 times daily (with meals).   ocrelizumab (OCREVUS) 300 MG/10ML injection More than a month  Yes Yes   Sig: Inject 600 mg into the vein every 6 months.   ondansetron (ZOFRAN ODT) 4 MG ODT tab 6/26/2025  Yes Yes   Sig: Take 4 mg by mouth every 8 hours as needed for nausea.   order for DME  Self No No   Sig: Equipment being ordered: Wheelchair (manual)   vitamin D3 (CHOLECALCIFEROL) 50 mcg (2000 units) tablet 6/26/2025 Morning Self No Yes   Sig: Take 1 tablet (50 mcg) by mouth daily      Facility-Administered Medications: None           Physical Exam   Vital Signs: Temp: 98.3  F (36.8  C) Temp src: Oral BP: 113/73 Pulse: 101   Resp: 16 SpO2: 100 % O2 Device: None (Room air)    Weight: 0 lbs 0 oz  Constitutional: Awake and alert, appears tired, resting in bed.   Eyes: Sclera clear, anicteric   Respiratory: Breathing non-labored. CTAB  Cardiovascular:  RRR, normal S1/S2. No rubs or murmurs. Intact bilateral pedal pulses. No peripheral edema.   GI: Soft, non-tender, non-distended.  Normoactive bowel sounds.   Skin:  Good color. No jaundice. No visible rashes, lesions, or bruising of concern.   Neurologic: Alert and oriented to person, place, and time. No focal deficits. Moves all extremities. Sensation intact throughout lower extremities.         Medical Decision Making       55 MINUTES SPENT BY ME on the date of service doing chart  review, history, exam, documentation & further activities per the note.      Data   ------------------------- PAST 24 HR DATA REVIEWED -----------------------------------------------    I have personally reviewed the following data over the past 24 hrs:    10.8  \   12.2   / 317     135 101 11.4 /  95   3.7 21 (L) 0.73 \     ALT: 18 AST: 19 AP: 79 TBILI: 0.3   ALB: 4.5 TOT PROTEIN: 7.4 LIPASE: N/A       Imaging results reviewed over the past 24 hrs:   Recent Results (from the past 24 hours)   MR Brain and Orbits w/o & w Contrast    Narrative    EXAM: MR BRAIN AND ORBITS W/O and W CONTRAST  LOCATION: [facility]  DATE: 6/27/2025    INDICATION: Concern for MS new lesions.  COMPARISON: Brain MRI 6/20/2025 and 4/13/2023.  CONTRAST: 7.5 mL Gadavist  TECHNIQUE:  1) Routine multiplanar multisequence head MRI without and with intravenous contrast.  2) Dedicated high-resolution multiplanar multisequence MRI of the orbits without and with intravenous contrast.    FINDINGS:  INTRACRANIAL CONTENTS: Multifocal periventricular and subcortical T2 hyperintensities and hyperintensity in the right posterior marleny are unchanged. No abnormal enhancement. No mass, acute hemorrhage, or extra-axial fluid collections. Normal ventricles   and sulci. Normal position of the cerebellar tonsils.    SELLA: No abnormality accounting for technique.    OSSEOUS STRUCTURES/SOFT TISSUES: Normal marrow signal. The major intracranial vascular flow voids are maintained.     ORBITS: Dedicated MRI of the orbits was performed. Intact globes. Symmetrical extraocular musculature without thickening/enlargement. The intraorbital, canalicular and prechiasmatic optic nerve segments are normal in size and signal intensity   bilaterally. The optic chiasm and proximal optic tracts are unremarkable. No localized inflammation of the intraconal or extraconal orbital fat. Normal lacrimal glands. No intraorbital mass or  pathologic intraorbital enhancement.     SINUSES/MASTOIDS: No paranasal sinus mucosal disease. No middle ear or mastoid effusion.       Impression    IMPRESSION:  HEAD MRI:  1.  Stable scattered demyelinating plaques without evidence of active demyelination.    2.  No acute superimposed intracranial abnormality.    ORBIT MRI:  1.  Normal MRI of the orbits.    MR Cervical Spine w/o & w Contrast    Narrative    EXAM: MR CERVICAL SPINE W/O and W CONTRAST  LOCATION: [facility]  DATE: 6/27/2025    INDICATION: Concern for MS new lesions.  COMPARISON: Cervical spine MRI 4/16/2023.  CONTRAST: 7.5 mL Gadavist  TECHNIQUE: MRI Cervical Spine without and with IV contrast.    FINDINGS:   Fairly extensive cord signal abnormality within the upper to mid cord from C2-C3 through approximately C4 without abnormal enhancement. Findings are stable. Minimal patchy T2 hyperintensities caudal to this also appear grossly unchanged. Mild reversal of   the normal cervical lordosis. No marrow edema.     Craniovertebral junction and C1-C2: Normal.    C2-C3: Normal disc height. No herniation. Normal facets. No spinal canal or neural foraminal stenosis.     C3-C4: Normal disc height. No herniation. Normal facets. No spinal canal or neural foraminal stenosis.     C4-C5: Normal disc height. No herniation. Normal facets. No spinal canal or neural foraminal stenosis.     C5-C6: Normal disc height. No herniation. Normal facets. No spinal canal or neural foraminal stenosis.     C6-C7: Normal disc height. No herniation. Normal facets. No spinal canal or neural foraminal stenosis.     C7-T1: Normal disc height. No herniation. Normal facets. No spinal canal or neural foraminal stenosis.      Impression    IMPRESSION:  1.  Extensive T2 hyperintense lesion in the mid to upper cervical cord. No definite abnormal enhancement on the current examination.    2.  Patchy T2 hyperintensities in the lower cervical cord  are also grossly unchanged.     MR Thoracic Spine w/o & w Contrast    Narrative    EXAM: MR THORACIC SPINE W/O and W CONTRAST  LOCATION: [facility]  DATE: 6/27/2025    INDICATION: concern for MS new lesions  COMPARISON: None.  CONTRAST: 7.5ml Gadavist  TECHNIQUE: Routine Thoracic Spine MRI without and with IV contrast.    FINDINGS:   Normal vertebral body heights, alignment and marrow signal. Normal disc heights. No herniation. Normal facets. No spinal canal or neural foraminal stenosis. No abnormal cord signal.     No extraspinal abnormality.      Impression    IMPRESSION:  1.  No abnormal cord signal or enhancement.    2.  No significant degenerative change.   POC US ABDOMEN LIMITED    Impression    Limited Bedside Renal Ultrasound, performed and interpreted by me.    Indication: Unable to void  Image quality was satisfactory, image of bladder obtained    Findings:  Approximately 1000 cc of fluid in bladder    IMPRESSION: Acute urinary retention, approximately 1000 cc of fluid in bladder

## 2025-06-27 NOTE — ED PROVIDER NOTES
History     Chief Complaint   Patient presents with    Numbness    One-sided Weakness     Patient present to triage with complain of left side weakness since her discharge from [facility] due to MS complications. Patient states that she has not peed since 1700 last night. Patient states that she her tremors which she had in the past are coming back.      MARTI Ferrer is a 24 year old female with PMH notable for multiple sclerosis who presents to the ED with urinary retention and weakness.  Weakness episode started around a week ago, went to the hospital in [place] and was admitted for 3 days for high-dose IV steroids.  Since discharge, she has had gradual worsening of the weakness on her left side.  She has been using a cane on the right side to support the left side weakness.  She has had no prescribed steroids at home.  Today, she last urinated at 4 PM, does have the urge to urinate but is unable to do so.  No recent dysuria, urgency, nor frequency.  No recent fevers, cough, vomiting, or diarrhea.     Physical Exam   BP: 111/71  Pulse: 58  Temp: 98  F (36.7  C)  Resp: 18  SpO2: 100 %    Physical Exam  General: no acute distress. Appears stated age.   HENT: MMM, no oropharyngeal lesions  Eyes: PERRL, normal sclerae   Cardio: Regular rate. Regular rhythm. Extremities well perfused  Resp: Normal work of breathing, Normal respiratory rate.   Abdomen: no tenderness, non-distended, no rebound, no guarding  Neuro: alert and fully oriented. No confusion. CN II-XII intact to testing. Strength left: 4+/5 , 4+/5 elbow flexion, 4+/5 elbow extension, 4+/5 shoulder abduction, 4/5 hip flexion, 4/5 knee flexion, 4/5 knee extension. Strength right: 5/5 , 5/5 elbow flexion, 5/5 elbow extension, 5/5 shoulder abduction, 5/5 hip flexion, 5/5 knee flexion, 5/5 knee extension. Sensation intact to soft touch in all extremities but decreased intensity throughout the right face and extremities. Normal tone, no  clonus. Normal coordination.  Psych: normal affect, normal behavior      ED Course      Procedures              Labs Ordered and Resulted from Time of ED Arrival to Time of ED Departure   COMPREHENSIVE METABOLIC PANEL - Abnormal       Result Value    Sodium 135      Potassium 3.7      Carbon Dioxide (CO2) 21 (*)     Anion Gap 13      Urea Nitrogen 11.4      Creatinine 0.73      GFR Estimate >90      Calcium 9.3      Chloride 101      Glucose 95      Alkaline Phosphatase 79      AST 19      ALT 18      Protein Total 7.4      Albumin 4.5      Bilirubin Total 0.3     CBC WITH PLATELETS AND DIFFERENTIAL - Abnormal    WBC Count 10.8      RBC Count 4.54      Hemoglobin 12.2      Hematocrit 37.1      MCV 82      MCH 26.9      MCHC 32.9      RDW 16.6 (*)     Platelet Count 317      % Neutrophils 51      % Lymphocytes 38      % Monocytes 7      % Eosinophils 3      % Basophils 0      % Immature Granulocytes 0      NRBCs per 100 WBC 0      Absolute Neutrophils 5.5      Absolute Lymphocytes 4.1      Absolute Monocytes 0.8      Absolute Eosinophils 0.4      Absolute Basophils 0.0      Absolute Immature Granulocytes 0.0      Absolute NRBCs 0.0     ROUTINE UA WITH MICROSCOPIC REFLEX TO CULTURE - Abnormal    Color Urine Light Yellow      Appearance Urine Clear      Glucose Urine Negative      Bilirubin Urine Negative      Ketones Urine Negative      Specific Gravity Urine 1.015      Blood Urine Negative      pH Urine 7.0      Protein Albumin Urine Negative      Urobilinogen Urine Normal      Nitrite Urine Negative      Leukocyte Esterase Urine Negative      Mucus Urine Present (*)     RBC Urine 0      WBC Urine <1     HCG QUALITATIVE PREGNANCY - Normal    hCG Serum Qualitative Negative       MR Thoracic Spine w/o & w Contrast   Final Result   IMPRESSION:   1.  No abnormal cord signal or enhancement.      2.  No significant degenerative change.      MR Cervical Spine w/o & w Contrast   Final Result   IMPRESSION:   1.  Extensive T2  hyperintense lesion in the mid to upper cervical cord. No definite abnormal enhancement on the current examination.      2.  Patchy T2 hyperintensities in the lower cervical cord are also grossly unchanged.         MR Brain and Orbits w/o & w Contrast   Final Result   IMPRESSION:   HEAD MRI:   1.  Stable scattered demyelinating plaques without evidence of active demyelination.      2.  No acute superimposed intracranial abnormality.      ORBIT MRI:   1.  Normal MRI of the orbits.                Medical Decision Making  The patient's presentation was of high complexity (a chronic illness severe exacerbation, progression, or side effect of treatment).    The patient's evaluation involved:  review of external note(s) from 1 sources ([facility] admission 6/20-23/2025)  review of 3+ test result(s) ordered prior to this encounter (MRIs 6/20 at [facility])  ordering and/or review of 3+ test(s) in this encounter (see separate area of note for details)  discussion of management or test interpretation with another health professional (neurology)    The patient's management necessitated moderate risk (prescription drug management including medications given in the ED).      Assessments & Plan   Patient presenting with progressive left side weakness and urinary retention in the context of MS and recent admission for a flare. Vitals in the ED unremarkable. Nursing notes reviewed.     Chart review notable for telephone encounter with neurology this past day, office visit with internal medicine for hospital follow-up earlier this past day, also admission at [facility] 6/20-23/2025 for MS flare.  Notes from those visits were reviewed.  Patient had high-dose steroids for 3 days during the admission, MRI that showed cervical MS lesions.  Patient reported urinary retention via MyChart this past evening.    Bladder scanner showed about 500 mL urinary bladder contents.  Straight cath performed.    Neurology consulted, recommended  MRIs of the brain, C-spine, and T-spine.  MRI showed similar cervical lesions compared to several days ago at [place].    Patient signed out to oncoming ED provider with plan to follow-up final neurology recommendations.     Final diagnoses:   Multiple sclerosis exacerbation (H)   Acute urinary retention     New Prescriptions    No medications on file     --  Julio Montelongo MD   Emergency Medicine   [facility] EMERGENCY DEPARTMENT  6/27/2025       Julio Montelongo MD  06/27/25 0751

## 2025-06-27 NOTE — ED TRIAGE NOTES
Patient present to triage with complain of left side weakness since her discharge from [facility] due to MS complications. Patient states that she has not peed since 1700 last night. Patient states that she her tremors which she had in the past are coming back.      Triage Assessment (Adult)       Row Name 06/27/25 0103          Triage Assessment    Airway WDL WDL        Respiratory WDL    Respiratory WDL WDL        Skin Circulation/Temperature WDL    Skin Circulation/Temperature WDL WDL        Cardiac WDL    Cardiac WDL WDL     Cardiac Rhythm SB        Peripheral/Neurovascular WDL    Peripheral Neurovascular WDL WDL        Cognitive/Neuro/Behavioral WDL    Cognitive/Neuro/Behavioral WDL WDL

## 2025-06-27 NOTE — ED NOTES
Emergency Department Patient Sign-out       Brief HPI:  This is a 24 year old female signed out to me by Dr. Montelongo.  See initial ED Provider note for details of the presentation.            Significant Events prior to my assuming care: n/a      Exam:   Patient Vitals for the past 24 hrs:   BP Temp Temp src Pulse Resp SpO2   06/27/25 0934 113/73 98.3  F (36.8  C) Oral 101 16 100 %   06/27/25 0600 98/52 98  F (36.7  C) Oral 82 18 99 %   06/27/25 0101 111/71 98  F (36.7  C) Oral 58 18 100 %       General: Patient is in no acute distress currently.  HEENT: Normocephalic atraumatic.    Neck: Supple  Cardiovascular: Heart rate normal  Pulmonary: Patient is in no respiratory distress  Extremities: No signs of any significant or life-threatening trauma.  Neurologic: No new focal neurologic deficits.         ED RESULTS:   Results for orders placed or performed during the hospital encounter of 06/27/25 (from the past 24 hours)   [place] Draw     Status: None (In process)    Collection Time: 06/27/25  1:16 AM    Narrative    The following orders were created for panel order [place] Draw.  Procedure                               Abnormality         Status                     ---------                               -----------         ------                     Extra Blue Top Tube[[id number]]                             In process                 Extra Red Top Tube[[id number]]                              In process                   Please view results for these tests on the individual orders.   CBC with Platelets & Differential     Status: Abnormal    Collection Time: 06/27/25  1:16 AM    Narrative    The following orders were created for panel order CBC with Platelets & Differential.  Procedure                               Abnormality         Status                     ---------                               -----------         ------                     CBC with platelets and ...[[id number]]  Abnormal            Final  result                 Please view results for these tests on the individual orders.   Comprehensive metabolic panel     Status: Abnormal    Collection Time: 06/27/25  1:16 AM   Result Value Ref Range    Sodium 135 135 - 145 mmol/L    Potassium 3.7 3.4 - 5.3 mmol/L    Carbon Dioxide (CO2) 21 (L) 22 - 29 mmol/L    Anion Gap 13 7 - 15 mmol/L    Urea Nitrogen 11.4 6.0 - 20.0 mg/dL    Creatinine 0.73 0.51 - 0.95 mg/dL    GFR Estimate >90 >60 mL/min/1.73m2    Calcium 9.3 8.8 - 10.4 mg/dL    Chloride 101 98 - 107 mmol/L    Glucose 95 70 - 99 mg/dL    Alkaline Phosphatase 79 40 - 150 U/L    AST 19 0 - 45 U/L    ALT 18 0 - 50 U/L    Protein Total 7.4 6.4 - 8.3 g/dL    Albumin 4.5 3.5 - 5.2 g/dL    Bilirubin Total 0.3 <=1.2 mg/dL   CBC with platelets and differential     Status: Abnormal    Collection Time: 06/27/25  1:16 AM   Result Value Ref Range    WBC Count 10.8 4.0 - 11.0 10e3/uL    RBC Count 4.54 3.80 - 5.20 10e6/uL    Hemoglobin 12.2 11.7 - 15.7 g/dL    Hematocrit 37.1 35.0 - 47.0 %    MCV 82 78 - 100 fL    MCH 26.9 26.5 - 33.0 pg    MCHC 32.9 31.5 - 36.5 g/dL    RDW 16.6 (H) 10.0 - 15.0 %    Platelet Count 317 150 - 450 10e3/uL    % Neutrophils 51 %    % Lymphocytes 38 %    % Monocytes 7 %    % Eosinophils 3 %    % Basophils 0 %    % Immature Granulocytes 0 %    NRBCs per 100 WBC 0 <1 /100    Absolute Neutrophils 5.5 1.6 - 8.3 10e3/uL    Absolute Lymphocytes 4.1 0.8 - 5.3 10e3/uL    Absolute Monocytes 0.8 0.0 - 1.3 10e3/uL    Absolute Eosinophils 0.4 0.0 - 0.7 10e3/uL    Absolute Basophils 0.0 0.0 - 0.2 10e3/uL    Absolute Immature Granulocytes 0.0 <=0.4 10e3/uL    Absolute NRBCs 0.0 10e3/uL   hCG Qualitative Pregnancy     Status: Normal    Collection Time: 06/27/25  1:16 AM   Result Value Ref Range    hCG Serum Qualitative Negative Negative   Neurology General Adult IP Consult: MS with urinary retention, progressive weakness; Consultant may enter orders: Yes; Requesting provider? ED Provider     Status: None ()     Collection Time: 25  1:28 AM    Angela Stephenson MD     2025  2:27 AM  [facility]  General Neurology Consult Note    Patient Name:  Cindi Ferrer  MRN:  [id number]    :  2000  Date of Service:  2025  Primary care provider:  Viviane England  Date of Admission: 2025      Consulted by: Julio Montelongo  Consulted for: weakness and urinary retention     Chief Complaint:  Chief Complaint   Patient presents with    Numbness    One-sided Weakness     Patient present to triage with complain of left side weakness   since her discharge from [facility] due to MS   complications. Patient states that she has not peed since 1700   last night. Patient states that she her tremors which she had in   the past are coming back.         History of Present Illness:  Cindi Ferrer is a 24 year old  female with a hx of relapsing   and remitting multiple sclerosis diagnosed 8 years ago (on   ocrelizumab infusion twice daily, most recently in 2025),   executive function deficit, migraine, significant psychiatric   history including psychosis, anxiety, depression and PTSD   presents to the ED and neurology consulted for left sided   weakness and urinary retention.     Per chart review recently seen in ED on 25 for bilateral   upper extremity numbness and tingling and weakness, back pain   with electrical sensation down her spine when bending her neck   and urinary incontinence. Cervical MRI shows progressive   demyelinating disease within the cervical spinal cord with tiny   foci of enhancement at the level of C5-C6 concerning for active   demyelination. MRI head showed chronic demyelinating lesions but   no new lesions or evidence of active demyelination. No other   acute intracranial abnormalities. MRI lumbar spine also showed   some chronic demyelinating plaque with subtle foci of T2   hyperintensity in the lower thoracic cord but  no active   demyelination s/p pulse dose steroid with IV Solu-Medrol 1000 mg   daily for 3 days. Discharged on steroid taper at that time.     Patient reports 2 day history of weakness on the left arm and   left with foot drop, reduced sensation of the right hemibody,   pain with eye movement to the left horizontal and upward gaze   with associated diplopia, and ongoing urinary retention. She   reports also dropping objects with both hands as well. Denies   dysphagia or vertigo.     ROS: See HPI, 10 point review of systems otherwise negative.    Past Medical History:  Past Medical History:   Diagnosis Date    Multiple sclerosis (H) 09/2017       Past Surgical History:  Past Surgical History:   Procedure Laterality Date    EP STUDY TILT TABLE N/A 12/29/2023    Procedure: Tilt Table Study;  Surgeon: Javi Cooper MD;    Location:  [facility]       Family History:  No family history on file.    Social History:  Social History     Tobacco Use    Smoking status: Never    Smokeless tobacco: Never   Substance Use Topics    Alcohol use: No       Allergies:  No Known Allergies    Medications:  No current facility-administered medications for this encounter.     Current Outpatient Medications   Medication Sig Dispense Refill    clonazePAM (KLONOPIN) 0.5 MG tablet TAKE 1 TO 2 TABLETS BY MOUTH   AT NIGHT AS NEEDED FOR TREMORS (Patient not taking: Reported on   3/26/2025) 60 tablet 1    ferrous sulfate (FEROSUL) 325 (65 Fe) MG tablet Take 325 mg by   mouth (Patient not taking: Reported on 1/27/2025)      gabapentin (NEURONTIN) 300 MG capsule TAKE UP TO 2 CAPSULES BY   MOUTH THREE TIMES DAILY (Patient not taking: Reported on   3/26/2025) 180 capsule 11    galcanezumab-gnlm (EMGALITY) 120 MG/ML injection inject 120 mg   subcutaneous every 28 days (Patient not taking: Reported on   1/27/2025) 1 mL 12    ivabradine (CORLANOR) 5 MG tablet Take 1 tablet (5 mg) by mouth   2 times daily (with meals). 60 tablet 11     levonorgestrel-ethinyl estradiol (AVIANE) 0.1-20 MG-MCG tablet   Take 1 tablet by mouth daily (Patient not taking: Reported on   1/27/2025)      modafinil (PROVIGIL) 200 MG tablet TAKE 1/2 (ONE-HALF) TABLET BY   MOUTH TWICE DAILY AS NEEDED FOR  FATIGUE  IN  THE  MORNING  AND    EARLY  AFTERNOON (Patient not taking: Reported on 1/27/2025) 30   tablet 5    natalizumab (TYSABRI) 300 MG/15ML injection Inject 300 mg into   the vein once every six weeks (Patient not taking: Reported on   1/27/2025)      order for DME Equipment being ordered: Wheelchair (manual)   (Patient not taking: Reported on 1/27/2025) 1 Device 0    prochlorperazine (COMPAZINE) 5 MG tablet Take 1-2 tablets (5-10   mg) by mouth every 6 hours as needed for nausea or vomiting   (Patient not taking: Reported on 1/27/2025) 20 tablet 3    SUMAtriptan (IMITREX) 50 MG tablet Take 1-2 tablets ( mg)   by mouth at onset of headache for migraine May repeat in 2 hours.   Max 4 tablets/24 hours. (Patient not taking: Reported on   1/27/2025) 12 tablet 9    vitamin D3 (CHOLECALCIFEROL) 50 mcg (2000 units) tablet Take 1   tablet (50 mcg) by mouth daily (Patient not taking: Reported on   1/27/2025) 90 tablet 3       Physical Exam:  Vitals: Patient Vitals for the past 8 hrs:   BP Temp Temp src Pulse Resp SpO2   06/27/25 0101 111/71 98  F (36.7  C) Oral 58 18 100 %       Vitals: /71   Pulse 58   Temp 98  F (36.7  C) (Oral)     Resp 18   SpO2 100%     Physical Exam:  Constitutional: Alert. Lying in bed comfortably. No acute   distress.   Head: Atraumatic, normocephalic.  ENT: Moist mucous membranes. No sinus drainage.  Cardiovascular: Appears warm, well-perfused, and non-toxic.  Respiratory: No increased work of breathing, no accessory muscle   use.   Gastrointestinal: Does not appear distended.  Musculoskeletal: Moving all four limbs spontaneously. Grossly   intact range of motion.   Skin: No rashes or lesions appreciated on visualized skin.    Hematologic/Lymphatic/Immunologic: No bruising appreciated on   visualized skin.     Neurologic:     Mental Status: alert, oriented to p/p/t. Speech is fluent, able   to comprehend, and follows commands. No dysarthria.    Cranial Nerves: pupils equal, round, and reactive to light and   accommodation, EOMI without nystagmus, eyes move conjugately, no   saccadic movement, no skew. Visual fields full. Smile and eyebrow   raise equal, blinking symmetric, no weakness. Lashes are buried   on eye squeeze. Nasolabial folds symmetric. Facial sensation   (V1-3) equal, jaw opening strong. Tongue midline. Shoulder shrug   symmetric, hearing intact to conversation.    Motor: no atrophy or fasciculations observed. Rhythmic tremors in   BLE that is notable at rest and with action.    MRC     Right Left   Shoulder abduction:            5 4+   Elbow Flexion: 5 5   Elbow Extension:            5 4+   Wrist Extension:      5 4+   Hip Flexion 5 4+   Knee Extension 5 5   Knee Flexion 5 4+   Dorsiflexion 5 4+   Plantarflexion 5 5      Reflexes: 3+  and symmetric at the   biceps/brachioradialis/patellar/achilles. Toes are downgoing. 2-3   beats of clonus bilaterally.     Sensory: reduced to light touch in the RUE and RLE but intact on   the LUE and LLE.     Coordination: FNF no dysmetria, HTS intact.     Gait: deferred       Labs/Imaging:  Recent Results (from the past 24 hours)   [place] Draw    Narrative    The following orders were created for panel order [place] Draw.  Procedure                               Abnormality           Status                     ---------                               -----------           ------                     Extra Blue Top Tube[[id number]]                             In   process                 Extra Red Top Tube[[id number]]                              In   process                   Please view results for these tests on the individual orders.   CBC with Platelets & Differential    Narrative     The following orders were created for panel order CBC with   Platelets & Differential.  Procedure                               Abnormality           Status                     ---------                               -----------           ------                     CBC with platelets and ...[[id number]]  Abnormal            Final   result                 Please view results for these tests on the individual orders.   Comprehensive metabolic panel   Result Value Ref Range    Sodium 135 135 - 145 mmol/L    Potassium 3.7 3.4 - 5.3 mmol/L    Carbon Dioxide (CO2) 21 (L) 22 - 29 mmol/L    Anion Gap 13 7 - 15 mmol/L    Urea Nitrogen 11.4 6.0 - 20.0 mg/dL    Creatinine 0.73 0.51 - 0.95 mg/dL    GFR Estimate >90 >60 mL/min/1.73m2    Calcium 9.3 8.8 - 10.4 mg/dL    Chloride 101 98 - 107 mmol/L    Glucose 95 70 - 99 mg/dL    Alkaline Phosphatase 79 40 - 150 U/L    AST 19 0 - 45 U/L    ALT 18 0 - 50 U/L    Protein Total 7.4 6.4 - 8.3 g/dL    Albumin 4.5 3.5 - 5.2 g/dL    Bilirubin Total 0.3 <=1.2 mg/dL   CBC with platelets and differential   Result Value Ref Range    WBC Count 10.8 4.0 - 11.0 10e3/uL    RBC Count 4.54 3.80 - 5.20 10e6/uL    Hemoglobin 12.2 11.7 - 15.7 g/dL    Hematocrit 37.1 35.0 - 47.0 %    MCV 82 78 - 100 fL    MCH 26.9 26.5 - 33.0 pg    MCHC 32.9 31.5 - 36.5 g/dL    RDW 16.6 (H) 10.0 - 15.0 %    Platelet Count 317 150 - 450 10e3/uL    % Neutrophils 51 %    % Lymphocytes 38 %    % Monocytes 7 %    % Eosinophils 3 %    % Basophils 0 %    % Immature Granulocytes 0 %    NRBCs per 100 WBC 0 <1 /100    Absolute Neutrophils 5.5 1.6 - 8.3 10e3/uL    Absolute Lymphocytes 4.1 0.8 - 5.3 10e3/uL    Absolute Monocytes 0.8 0.0 - 1.3 10e3/uL    Absolute Eosinophils 0.4 0.0 - 0.7 10e3/uL    Absolute Basophils 0.0 0.0 - 0.2 10e3/uL    Absolute Immature Granulocytes 0.0 <=0.4 10e3/uL    Absolute NRBCs 0.0 10e3/uL   UA with Microscopic reflex to Culture    Specimen: Urine, Catheter   Result Value Ref Range    Color Urine Light  Yellow Colorless, Straw, Light Yellow, Yellow    Appearance Urine Clear Clear    Glucose Urine Negative Negative mg/dL    Bilirubin Urine Negative Negative    Ketones Urine Negative Negative mg/dL    Specific Gravity Urine 1.015 1.003 - 1.035    Blood Urine Negative Negative    pH Urine 7.0 5.0 - 7.0    Protein Albumin Urine Negative Negative mg/dL    Urobilinogen Urine Normal Normal mg/dL    Nitrite Urine Negative Negative    Leukocyte Esterase Urine Negative Negative    Mucus Urine Present (A) None Seen /LPF    RBC Urine 0 <=2 /HPF    WBC Urine <1 <=5 /HPF    Narrative    Urine Culture not indicated     MRI head with and without contrast 6/20/2025  IMPRESSION:  1. Stable chronic demyelinating lesions. No new lesions. No   evidence for active demyelination.  2. No acute intracranial process.    MRI cervical spine with and without contrast 6/20/2025  IMPRESSION:  Progressive demyelinating disease within the cervical spinal cord   with tiny foci of enhancement at the levels of C5-C6 concerning   for active demyelination.    MRI lumbar spine with and without contrast 6/20/2025  IMPRESSION:  1. No acute abnormality of the lumbar spine.  2. Subtle foci of T2/STIR hyperintensity in the lower thoracic   cord consistent with chronic demyelinating plaques. No active   demyelination at this time.  3. No significant degenerative changes. No central canal or   neural foraminal narrowing throughout the lumbar spine.     Assessment and Recommendations:  Cindi Ferrer is a 24 year old  female with a hx of relapsing   and remitting multiple sclerosis diagnosed 8 years ago (on   ocrelizumab infusion twice daily, most recently in January 2025),   executive function deficit, migraine, significant psychiatric   history including psychosis, anxiety, depression and PTSD   presents to the ED and neurology consulted for weakness and   urinary retention.     Per chart review recently hospitalized on 6/20/25-6/23/25 for   bilateral upper  extremity numbness and tingling and weakness,   back pain with electrical sensation down her spine when bending   her neck and urinary incontinence. Cervical MRI shows progressive   demyelinating disease within the cervical spinal cord with tiny   foci of enhancement at the level of C5-C6 concerning for active   demyelination. MRI head showed chronic demyelinating lesions but   no new lesions or evidence of active demyelination. No other   acute intracranial abnormalities. MRI lumbar spine also showed   some chronic demyelinating plaque with subtle foci of T2   hyperintensity in the lower thoracic cord but no active   demyelination s/p pulse dose steroid with IV Solu-Medrol 1000 mg   daily for 3 days. Discharged no steroid taper at that time per   neurology. Neurology recommended teaching for urinary retention   at home with straight catheter and no steroid taper with   outpatient follow up with neurology.    Exam notable for 4+/5 in LUE and LLE, reduced light touch   sensation RUE/RLE, and diplopia with left horizontal and upward   gaze with associated pain. Recommend MRI brain w/ orbits,   cervical, and thoracic spine w/ w/o contrast. Further management   and recommendations pending imaging.     Recs:  -MRI brain w/ orbits, cervical, and thoracic spine w/ w/o   contrast  -Further management and recommendations pending imaging    Thank you for involving neurology in the care of Cindi Ferrer.    The Neurology Service will continue to follow Please do not   hesitate to call with questions/concerns (consult pager [phone]).      Patient seen and discussed with attending neurologist Dr. Rodrigues.     Angela Tatum MD  PGY-3 Neurology           UA with Microscopic reflex to Culture     Status: Abnormal    Collection Time: 06/27/25  1:46 AM    Specimen: Urine, Catheter   Result Value Ref Range    Color Urine Light Yellow Colorless, Straw, Light Yellow, Yellow    Appearance Urine Clear Clear    Glucose Urine Negative  Negative mg/dL    Bilirubin Urine Negative Negative    Ketones Urine Negative Negative mg/dL    Specific Gravity Urine 1.015 1.003 - 1.035    Blood Urine Negative Negative    pH Urine 7.0 5.0 - 7.0    Protein Albumin Urine Negative Negative mg/dL    Urobilinogen Urine Normal Normal mg/dL    Nitrite Urine Negative Negative    Leukocyte Esterase Urine Negative Negative    Mucus Urine Present (A) None Seen /LPF    RBC Urine 0 <=2 /HPF    WBC Urine <1 <=5 /HPF    Narrative    Urine Culture not indicated   MR Brain and Orbits w/o & w Contrast     Status: None    Collection Time: 06/27/25  5:45 AM    Narrative    EXAM: MR BRAIN AND ORBITS W/O and W CONTRAST  LOCATION: [facility]  DATE: 6/27/2025    INDICATION: Concern for MS new lesions.  COMPARISON: Brain MRI 6/20/2025 and 4/13/2023.  CONTRAST: 7.5 mL Gadavist  TECHNIQUE:  1) Routine multiplanar multisequence head MRI without and with intravenous contrast.  2) Dedicated high-resolution multiplanar multisequence MRI of the orbits without and with intravenous contrast.    FINDINGS:  INTRACRANIAL CONTENTS: Multifocal periventricular and subcortical T2 hyperintensities and hyperintensity in the right posterior marleny are unchanged. No abnormal enhancement. No mass, acute hemorrhage, or extra-axial fluid collections. Normal ventricles   and sulci. Normal position of the cerebellar tonsils.    SELLA: No abnormality accounting for technique.    OSSEOUS STRUCTURES/SOFT TISSUES: Normal marrow signal. The major intracranial vascular flow voids are maintained.     ORBITS: Dedicated MRI of the orbits was performed. Intact globes. Symmetrical extraocular musculature without thickening/enlargement. The intraorbital, canalicular and prechiasmatic optic nerve segments are normal in size and signal intensity   bilaterally. The optic chiasm and proximal optic tracts are unremarkable. No localized inflammation of the intraconal or extraconal  orbital fat. Normal lacrimal glands. No intraorbital mass or pathologic intraorbital enhancement.     SINUSES/MASTOIDS: No paranasal sinus mucosal disease. No middle ear or mastoid effusion.       Impression    IMPRESSION:  HEAD MRI:  1.  Stable scattered demyelinating plaques without evidence of active demyelination.    2.  No acute superimposed intracranial abnormality.    ORBIT MRI:  1.  Normal MRI of the orbits.    MR Cervical Spine w/o & w Contrast     Status: None    Collection Time: 06/27/25  5:45 AM    Narrative    EXAM: MR CERVICAL SPINE W/O and W CONTRAST  LOCATION: [facility]  DATE: 6/27/2025    INDICATION: Concern for MS new lesions.  COMPARISON: Cervical spine MRI 4/16/2023.  CONTRAST: 7.5 mL Gadavist  TECHNIQUE: MRI Cervical Spine without and with IV contrast.    FINDINGS:   Fairly extensive cord signal abnormality within the upper to mid cord from C2-C3 through approximately C4 without abnormal enhancement. Findings are stable. Minimal patchy T2 hyperintensities caudal to this also appear grossly unchanged. Mild reversal of   the normal cervical lordosis. No marrow edema.     Craniovertebral junction and C1-C2: Normal.    C2-C3: Normal disc height. No herniation. Normal facets. No spinal canal or neural foraminal stenosis.     C3-C4: Normal disc height. No herniation. Normal facets. No spinal canal or neural foraminal stenosis.     C4-C5: Normal disc height. No herniation. Normal facets. No spinal canal or neural foraminal stenosis.     C5-C6: Normal disc height. No herniation. Normal facets. No spinal canal or neural foraminal stenosis.     C6-C7: Normal disc height. No herniation. Normal facets. No spinal canal or neural foraminal stenosis.     C7-T1: Normal disc height. No herniation. Normal facets. No spinal canal or neural foraminal stenosis.      Impression    IMPRESSION:  1.  Extensive T2 hyperintense lesion in the mid to upper cervical cord. No  definite abnormal enhancement on the current examination.    2.  Patchy T2 hyperintensities in the lower cervical cord are also grossly unchanged.     MR Thoracic Spine w/o & w Contrast     Status: None    Collection Time: 06/27/25  5:45 AM    Narrative    EXAM: MR THORACIC SPINE W/O and W CONTRAST  LOCATION: [facility]  DATE: 6/27/2025    INDICATION: concern for MS new lesions  COMPARISON: None.  CONTRAST: 7.5ml Gadavist  TECHNIQUE: Routine Thoracic Spine MRI without and with IV contrast.    FINDINGS:   Normal vertebral body heights, alignment and marrow signal. Normal disc heights. No herniation. Normal facets. No spinal canal or neural foraminal stenosis. No abnormal cord signal.     No extraspinal abnormality.      Impression    IMPRESSION:  1.  No abnormal cord signal or enhancement.    2.  No significant degenerative change.   POC US ABDOMEN LIMITED     Status: None    Collection Time: 06/27/25 10:12 AM    Impression    Limited Bedside Renal Ultrasound, performed and interpreted by me.    Indication: Unable to void  Image quality was satisfactory, image of bladder obtained    Findings:  Approximately 1000 cc of fluid in bladder    IMPRESSION: Acute urinary retention, approximately 1000 cc of fluid in bladder            ED MEDICATIONS:   Medications   LORazepam (ATIVAN) tablet 1 mg (1 mg Oral $Given 6/27/25 0306)   gadobutrol (GADAVIST) injection 7.5 mL (7.5 mLs Intravenous $Given 6/27/25 0522)   clonazePAM (klonoPIN) tablet 1 mg (1 mg Oral $Given 6/27/25 0938)   ivabradine (CORLANOR) tablet 5 mg (5 mg Oral $Given 6/27/25 1111)   gabapentin (NEURONTIN) capsule 100 mg (100 mg Oral $Given 6/27/25 0938)         Impression:    ICD-10-CM    1. Multiple sclerosis exacerbation (H)  G35       2. Acute urinary retention  R33.8           Plan:    Pending studies include neurology recommendations  Neurology recommended discharge home with follow-up with urology due to acute  urinary retention  However I went back and spoke with patient and she was retaining over a liter in her bladder, and at this point as she reported to me that she had acute urinary retention when she was having weakness, which improved with steroids, at this point unclear if this is secondary to an MS flare versus a supratentorial cause  Therefore discussed with neurology for plan to admit, neurology said that they would prefer not today primary  Discussed case with medicine who accepted patient for admission with neurology following, and I did place an inpatient urology consult for acute urinary retention as well..        MD Velma Gambino Collin, MD  06/27/25 3731

## 2025-06-27 NOTE — CONSULTS
Brief day team addendum    MRI brain, cervical and thoracic spine reviewed.  No obvious sign of acute inflammation/contrast-enhancement on these images, making an acute multiple sclerosis flare unlikely at this time.  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  That being said, her examination this morning seems to have functional overlay.  Upon arrival to her room, she was limping, walking slowly with a cane and requiring assistance from her mother.  However during formal examination, though she exhibited some sign of left foot drop on isolated muscle testing, she was able to stand on her tiptoes, stand on her ankles and walk without her cane unassisted without falling.      In regards to her new onset urinary retention, her cervical cord lesion may be an underlying explanation, but since this lesion is not new, it would be difficult to directly blame it for sudden new onset urinary retention.   Her story is not particularly clear that she is saying she had new urinary incontinence that started last week and resolved with steroid treatment, but has now suddenly returned as of 4 PM yesterday.  If a small area of new inflammation was in fact identified on imaging at C5-6 on 6/20, would not particularly expect new urinary incontinence to suddenly improve with treatment of IV steroids with return so abruptly yesterday.  Additionally, oddly she states that it started out as urge incontinence yesterday, but now she no longer feels that urge to urinate at all.  Per report, she required straight catheterization overnight at approximately 1 AM.  UA was negative which is reassuring.  Would recommend ruling out other potential causes of urinary dysfunction at this time.  Would consider urology consultation to round out workup.    Updated recommendations:  - No indication for IV steroids at this time  -  Medicine/urology involvement to rule out other causes of acute urinary retention  - May consider urodynamic testing outpatient if persists  - Lumbar puncture with meningitis panel, basic labs, and ADRIANA virus   - Follow-up with multiple sclerosis clinician Dr. Wilson outpatient as planned on   - Continue Ocrevus, next infusion appears to be scheduled for , assuming normal CSF labs           [facility]  General Neurology Consult Note    Patient Name:  Cindi Ferrer  MRN:  [id number]    :  2000  Date of Service:  2025  Primary care provider:  Viviane England  Date of Admission: 2025      Consulted by: Julio Montelongo  Consulted for: weakness and urinary retention     Chief Complaint:  Chief Complaint   Patient presents with    Numbness    One-sided Weakness     Patient present to triage with complain of left side weakness since her discharge from [facility] due to MS complications. Patient states that she has not peed since 1700 last night. Patient states that she her tremors which she had in the past are coming back.         History of Present Illness:  Cindi Ferrer is a 24 year old  female with a hx of relapsing and remitting multiple sclerosis diagnosed 8 years ago (on ocrelizumab infusion twice daily, most recently in 2025), executive function deficit, migraine, significant psychiatric history including psychosis, anxiety, depression and PTSD presents to the ED and neurology consulted for left sided weakness and urinary retention.     Per chart review recently seen in ED on 25 for bilateral upper extremity numbness and tingling and weakness, back pain with electrical sensation down her spine when bending her neck and urinary incontinence. Cervical MRI shows progressive demyelinating disease within the cervical spinal cord with tiny foci of enhancement at the level of C5-C6 concerning for active demyelination. MRI head  showed chronic demyelinating lesions but no new lesions or evidence of active demyelination. No other acute intracranial abnormalities. MRI lumbar spine also showed some chronic demyelinating plaque with subtle foci of T2 hyperintensity in the lower thoracic cord but no active demyelination s/p pulse dose steroid with IV Solu-Medrol 1000 mg daily for 3 days. Discharged on steroid taper at that time.     Patient reports 2 day history of weakness on the left arm and left with foot drop, reduced sensation of the right hemibody, pain with eye movement to the left horizontal and upward gaze with associated diplopia, and ongoing urinary retention. She reports also dropping objects with both hands as well. Denies dysphagia or vertigo.     ROS: See HPI, 10 point review of systems otherwise negative.    Past Medical History:  Past Medical History:   Diagnosis Date    Multiple sclerosis (H) 09/2017       Past Surgical History:  Past Surgical History:   Procedure Laterality Date    EP STUDY TILT TABLE N/A 12/29/2023    Procedure: Tilt Table Study;  Surgeon: Javi Cooper MD;  Location:  [facility]       Family History:  No family history on file.    Social History:  Social History     Tobacco Use    Smoking status: Never    Smokeless tobacco: Never   Substance Use Topics    Alcohol use: No       Allergies:  No Known Allergies    Medications:  No current facility-administered medications for this encounter.     Current Outpatient Medications   Medication Sig Dispense Refill    clonazePAM (KLONOPIN) 0.5 MG tablet TAKE 1 TO 2 TABLETS BY MOUTH AT NIGHT AS NEEDED FOR TREMORS (Patient not taking: Reported on 3/26/2025) 60 tablet 1    ferrous sulfate (FEROSUL) 325 (65 Fe) MG tablet Take 325 mg by mouth (Patient not taking: Reported on 1/27/2025)      gabapentin (NEURONTIN) 300 MG capsule TAKE UP TO 2 CAPSULES BY MOUTH THREE TIMES DAILY (Patient not taking: Reported on 3/26/2025) 180 capsule 11     galcanezumab-gnlm (EMGALITY) 120 MG/ML injection inject 120 mg subcutaneous every 28 days (Patient not taking: Reported on 1/27/2025) 1 mL 12    ivabradine (CORLANOR) 5 MG tablet Take 1 tablet (5 mg) by mouth 2 times daily (with meals). 60 tablet 11    levonorgestrel-ethinyl estradiol (AVIANE) 0.1-20 MG-MCG tablet Take 1 tablet by mouth daily (Patient not taking: Reported on 1/27/2025)      modafinil (PROVIGIL) 200 MG tablet TAKE 1/2 (ONE-HALF) TABLET BY MOUTH TWICE DAILY AS NEEDED FOR  FATIGUE  IN  THE  MORNING  AND  EARLY  AFTERNOON (Patient not taking: Reported on 1/27/2025) 30 tablet 5    natalizumab (TYSABRI) 300 MG/15ML injection Inject 300 mg into the vein once every six weeks (Patient not taking: Reported on 1/27/2025)      order for DME Equipment being ordered: Wheelchair (manual) (Patient not taking: Reported on 1/27/2025) 1 Device 0    prochlorperazine (COMPAZINE) 5 MG tablet Take 1-2 tablets (5-10 mg) by mouth every 6 hours as needed for nausea or vomiting (Patient not taking: Reported on 1/27/2025) 20 tablet 3    SUMAtriptan (IMITREX) 50 MG tablet Take 1-2 tablets ( mg) by mouth at onset of headache for migraine May repeat in 2 hours. Max 4 tablets/24 hours. (Patient not taking: Reported on 1/27/2025) 12 tablet 9    vitamin D3 (CHOLECALCIFEROL) 50 mcg (2000 units) tablet Take 1 tablet (50 mcg) by mouth daily (Patient not taking: Reported on 1/27/2025) 90 tablet 3       Physical Exam:  Vitals: Patient Vitals for the past 8 hrs:   BP Temp Temp src Pulse Resp SpO2   06/27/25 0101 111/71 98  F (36.7  C) Oral 58 18 100 %       Vitals: /71   Pulse 58   Temp 98  F (36.7  C) (Oral)   Resp 18   SpO2 100%     Physical Exam:  Constitutional: Alert. Lying in bed comfortably. No acute distress.   Head: Atraumatic, normocephalic.  ENT: Moist mucous membranes. No sinus drainage.  Cardiovascular: Appears warm, well-perfused, and non-toxic.  Respiratory: No increased work of breathing, no accessory  muscle use.   Gastrointestinal: Does not appear distended.  Musculoskeletal: Moving all four limbs spontaneously. Grossly intact range of motion.   Skin: No rashes or lesions appreciated on visualized skin.   Hematologic/Lymphatic/Immunologic: No bruising appreciated on visualized skin.     Neurologic:     Mental Status: alert, oriented to p/p/t. Speech is fluent, able to comprehend, and follows commands. No dysarthria.    Cranial Nerves: pupils equal, round, and reactive to light and accommodation, EOMI without nystagmus, eyes move conjugately, no saccadic movement, no skew. Visual fields full. Smile and eyebrow raise equal, blinking symmetric, no weakness. Lashes are buried on eye squeeze. Nasolabial folds symmetric. Facial sensation (V1-3) equal, jaw opening strong. Tongue midline. Shoulder shrug symmetric, hearing intact to conversation.    Motor: no atrophy or fasciculations observed. Rhythmic tremors in BLE that is notable at rest and with action.    MRC     Right Left   Shoulder abduction:            5 4+   Elbow Flexion: 5 5   Elbow Extension:            5 4+   Wrist Extension:      5 4+   Hip Flexion 5 4+   Knee Extension 5 5   Knee Flexion 5 4+   Dorsiflexion 5 4+   Plantarflexion 5 5      Reflexes: Bilateral Webster present, 3+  and symmetric at the biceps/brachioradialis/patellar/achilles. Toes are downgoing. 2-3 beats of clonus on the right, no clonus on the left.     Sensory: reduced to light touch in the RUE and RLE but intact on the LUE and LLE.     Coordination: FNF no dysmetria, HTS intact.     Gait: Able to walk without assistance and without ataxia.  She is able to march in place, lifting each knee and standing on 1 leg with minimal support from the examiner.  She is able to stand on her tiptoes and heels.      Labs/Imaging:  Recent Results (from the past 24 hours)   [place] Draw    Narrative    The following orders were created for panel order [place] Draw.  Procedure                                Abnormality         Status                     ---------                               -----------         ------                     Extra Blue Top Tube[[id number]]                             In process                 Extra Red Top Tube[[id number]]                              In process                   Please view results for these tests on the individual orders.   CBC with Platelets & Differential    Narrative    The following orders were created for panel order CBC with Platelets & Differential.  Procedure                               Abnormality         Status                     ---------                               -----------         ------                     CBC with platelets and ...[[id number]]  Abnormal            Final result                 Please view results for these tests on the individual orders.   Comprehensive metabolic panel   Result Value Ref Range    Sodium 135 135 - 145 mmol/L    Potassium 3.7 3.4 - 5.3 mmol/L    Carbon Dioxide (CO2) 21 (L) 22 - 29 mmol/L    Anion Gap 13 7 - 15 mmol/L    Urea Nitrogen 11.4 6.0 - 20.0 mg/dL    Creatinine 0.73 0.51 - 0.95 mg/dL    GFR Estimate >90 >60 mL/min/1.73m2    Calcium 9.3 8.8 - 10.4 mg/dL    Chloride 101 98 - 107 mmol/L    Glucose 95 70 - 99 mg/dL    Alkaline Phosphatase 79 40 - 150 U/L    AST 19 0 - 45 U/L    ALT 18 0 - 50 U/L    Protein Total 7.4 6.4 - 8.3 g/dL    Albumin 4.5 3.5 - 5.2 g/dL    Bilirubin Total 0.3 <=1.2 mg/dL   CBC with platelets and differential   Result Value Ref Range    WBC Count 10.8 4.0 - 11.0 10e3/uL    RBC Count 4.54 3.80 - 5.20 10e6/uL    Hemoglobin 12.2 11.7 - 15.7 g/dL    Hematocrit 37.1 35.0 - 47.0 %    MCV 82 78 - 100 fL    MCH 26.9 26.5 - 33.0 pg    MCHC 32.9 31.5 - 36.5 g/dL    RDW 16.6 (H) 10.0 - 15.0 %    Platelet Count 317 150 - 450 10e3/uL    % Neutrophils 51 %    % Lymphocytes 38 %    % Monocytes 7 %    % Eosinophils 3 %    % Basophils 0 %    % Immature Granulocytes 0 %    NRBCs per 100 WBC 0 <1 /100     Absolute Neutrophils 5.5 1.6 - 8.3 10e3/uL    Absolute Lymphocytes 4.1 0.8 - 5.3 10e3/uL    Absolute Monocytes 0.8 0.0 - 1.3 10e3/uL    Absolute Eosinophils 0.4 0.0 - 0.7 10e3/uL    Absolute Basophils 0.0 0.0 - 0.2 10e3/uL    Absolute Immature Granulocytes 0.0 <=0.4 10e3/uL    Absolute NRBCs 0.0 10e3/uL   UA with Microscopic reflex to Culture    Specimen: Urine, Catheter   Result Value Ref Range    Color Urine Light Yellow Colorless, Straw, Light Yellow, Yellow    Appearance Urine Clear Clear    Glucose Urine Negative Negative mg/dL    Bilirubin Urine Negative Negative    Ketones Urine Negative Negative mg/dL    Specific Gravity Urine 1.015 1.003 - 1.035    Blood Urine Negative Negative    pH Urine 7.0 5.0 - 7.0    Protein Albumin Urine Negative Negative mg/dL    Urobilinogen Urine Normal Normal mg/dL    Nitrite Urine Negative Negative    Leukocyte Esterase Urine Negative Negative    Mucus Urine Present (A) None Seen /LPF    RBC Urine 0 <=2 /HPF    WBC Urine <1 <=5 /HPF    Narrative    Urine Culture not indicated     MRI head with and without contrast 6/20/2025  IMPRESSION:  1. Stable chronic demyelinating lesions. No new lesions. No evidence for active demyelination.  2. No acute intracranial process.    MRI cervical spine with and without contrast 6/20/2025  IMPRESSION:  Progressive demyelinating disease within the cervical spinal cord with tiny foci of enhancement at the levels of C5-C6 concerning for active demyelination.    MRI lumbar spine with and without contrast 6/20/2025  IMPRESSION:  1. No acute abnormality of the lumbar spine.  2. Subtle foci of T2/STIR hyperintensity in the lower thoracic cord consistent with chronic demyelinating plaques. No active demyelination at this time.  3. No significant degenerative changes. No central canal or neural foraminal narrowing throughout the lumbar spine.     Assessment and Recommendations:  Cindi Ferrer is a 24 year old  female with a hx of relapsing and remitting  multiple sclerosis diagnosed 8 years ago (on ocrelizumab infusion twice daily, most recently in January 2025), executive function deficit, migraine, significant psychiatric history including psychosis, anxiety, depression and PTSD presents to the ED and neurology consulted for weakness and urinary retention.     Per chart review recently hospitalized on 6/20/25-6/23/25 for bilateral upper extremity numbness and tingling and weakness, back pain with electrical sensation down her spine when bending her neck and urinary incontinence. Cervical MRI shows progressive demyelinating disease within the cervical spinal cord with tiny foci of enhancement at the level of C5-C6 concerning for active demyelination. MRI head showed chronic demyelinating lesions but no new lesions or evidence of active demyelination. No other acute intracranial abnormalities. MRI lumbar spine also showed some chronic demyelinating plaque with subtle foci of T2 hyperintensity in the lower thoracic cord but no active demyelination s/p pulse dose steroid with IV Solu-Medrol 1000 mg daily for 3 days. Discharged no steroid taper at that time per neurology. Neurology recommended teaching for urinary retention at home with straight catheter and no steroid taper with outpatient follow up with neurology.    Exam notable for 4+/5 in LUE and LLE, reduced light touch sensation RUE/RLE, and diplopia with left horizontal and upward gaze with associated pain. Recommend MRI brain w/ orbits, cervical, and thoracic spine w/ w/o contrast. Further management and recommendations pending imaging.     Recs:  -MRI brain w/ orbits, cervical, and thoracic spine w/ w/o contrast  -Further management and recommendations pending imaging    Thank you for involving neurology in the care of Cindi Ferrer.  The Neurology Service will continue to follow Please do not hesitate to call with questions/concerns (consult pager [phone]).      Patient seen and discussed with attending  neurologist Dr. Rodrigues.     Angela Tatum MD  PGY-3 Neurology

## 2025-06-27 NOTE — PHARMACY-ADMISSION MEDICATION HISTORY
Pharmacist Admission Medication History    Admission medication history is complete. The information provided in this note is only as accurate as the sources available at the time of the update.    Information Source(s): Patient and CareEverywhere/SureScripts via in-person    Pertinent Information: Pt was interviewed at bedside and was found to be a reliable historian.     Changes made to PTA medication list:  Added:   Ondansetron 4 mg ODT  Ocrevus infusion q6mo  Olanzapine 5mg q AM - to start 6/29  Deleted:   Emgality  Aviane birth control  Modafinil 200 mg  Tysabri 300mg q6 weeks  Prochlorperazine 5mg   Sumatriptan 50mg  Changed:   Clonazepam 0.5mg 1-2 tablets q HS >> 1 mg BID    Allergies reviewed with patient and updates made in EHR: yes, KNDA    Medication History Completed By: Fatmata Pimentel, PharmD PGY1 resident 6/27/2025 9:30 AM    PTA Med List   Medication Sig Note Last Dose/Taking    clonazePAM (KLONOPIN) 1 MG tablet Take 1 mg by mouth 2 times daily.  6/26/2025 Evening    ferrous sulfate (FEROSUL) 325 (65 Fe) MG tablet Take 325 mg by mouth daily.  6/26/2025    gabapentin (NEURONTIN) 100 MG capsule Take 100 mg by mouth 3 times daily.  6/26/2025 Evening    ivabradine (CORLANOR) 5 MG tablet Take 1 tablet (5 mg) by mouth 2 times daily (with meals).  6/26/2025 Evening    ocrelizumab (OCREVUS) 300 MG/10ML injection Inject 600 mg into the vein every 6 months. 6/27/2025: Next dose due 7/30 More than a month    OLANZapine (ZYPREXA) 5 MG tablet Take 5 mg by mouth every morning. 6/27/2025: Instructed to start 6/29/25 Taking    ondansetron (ZOFRAN ODT) 4 MG ODT tab Take 4 mg by mouth every 8 hours as needed for nausea.  6/26/2025    vitamin D3 (CHOLECALCIFEROL) 50 mcg (2000 units) tablet Take 1 tablet (50 mcg) by mouth daily  6/26/2025 Morning

## 2025-06-27 NOTE — CONSULTS
Urology Consult History and Physical    Name: Cindi Ferrer    MRN: [id number]   YOB: 2000              Impression and Plan:   Impression / Plan:   Cindi Ferrer is a 24 year old female with a hx of relapsing and remitting multiple sclerosis diagnosed 8 years ago (on ocrelizumab infusion twice daily, most recently in January 2025), executive function deficit, migraine, significant psychiatric history including psychosis, anxiety, depression and PTSD presents to the ED for left sided weakness and urinary retention.     - urinary retention and urinary incontinence could be related to her cervical cord lesion or MS.  - suspect stress incontinence from weak sphincter versus overflow incontinence   - butler placement for urinary retention. Okay for TOV prior to discharge. If fails, could consider CIC q4h versus keeping the butler. Patient is motivated to learn CIC. If she decides to keep the butler, the butler needs to be changed every month.  - we will arrange outpatient follow up          Urology will sign off.    Discussed with Dr. Dickey    Thank you for the opportunity to participate in the care of Cindi Ferrer.     Ani Cardenas NP  Department of Urology              Chief Complaint:   We were asked to see Cindi Ferrer at the request of Dr. Carreon for evaluation and treatment of the following chief complaint:  Acute urinary retention        History is obtained from chart review and patient report          History of Present Illness:   Cindi Ferrer is a 24 year old female with a hx of relapsing and remitting multiple sclerosis diagnosed 8 years ago (on ocrelizumab infusion twice daily, most recently in January 2025), executive function deficit, migraine, significant psychiatric history including psychosis, anxiety, depression and PTSD presents to the ED and neurology consulted for left sided weakness and urinary retention.      She was recently hospitalized for multiple sclerosis  exacerbation. She developed acute urinary retention at that time and resolved with steroids. C/o mild intermittent urinary incontinence started about a week ago, not sure if related to position changes/cough versus urgency.  Started to have urinary retention last night. No urgency to urinate. Straight cathed a few times. Bladder scan>1L today. No constipation. No gross hematuria.    Urinalysis negative. Cr 0.73, WBC 10.8    Per neurology's note -  It is true that she likely has underlying organic disability from her multiple sclerosis, certainly from her cervical cord lesion, however it is unclear whether how much is psychogenic/functional overlay at this time.  She does have a significant psychiatric history that complicates the picture.            Past Medical History:     Past Medical History:   Diagnosis Date    Multiple sclerosis (H) 09/2017            Past Surgical History:     Past Surgical History:   Procedure Laterality Date    EP STUDY TILT TABLE N/A 12/29/2023    Procedure: Tilt Table Study;  Surgeon: Javi Cooper MD;  Location:  [facility]            Social History:     Social History     Tobacco Use    Smoking status: Never    Smokeless tobacco: Never   Substance Use Topics    Alcohol use: No            Family History:   No family history on file.         Allergies:   No Known Allergies         Medications:     No current facility-administered medications for this encounter.     Current Outpatient Medications   Medication Sig Dispense Refill    clonazePAM (KLONOPIN) 1 MG tablet Take 1 mg by mouth 2 times daily.      ferrous sulfate (FEROSUL) 325 (65 Fe) MG tablet Take 325 mg by mouth daily.      gabapentin (NEURONTIN) 100 MG capsule Take 100 mg by mouth 3 times daily.      ivabradine (CORLANOR) 5 MG tablet Take 1 tablet (5 mg) by mouth 2 times daily (with meals). 60 tablet 11    ocrelizumab (OCREVUS) 300 MG/10ML injection Inject 600 mg into the vein every 6 months.      OLANZapine  (ZYPREXA) 5 MG tablet Take 5 mg by mouth every morning.      ondansetron (ZOFRAN ODT) 4 MG ODT tab Take 4 mg by mouth every 8 hours as needed for nausea.      vitamin D3 (CHOLECALCIFEROL) 50 mcg (2000 units) tablet Take 1 tablet (50 mcg) by mouth daily 90 tablet 3    order for DME Equipment being ordered: Wheelchair (manual) 1 Device 0             Review of Systems:    ROS: See HPI for pertinent details.  Remainder of 10-point ROS negative.  REVIEW OF SYSTEMS:  Skin: negative  Eyes: negative  Ears/Nose/Throat: negative  Respiratory: No shortness of breath, dyspnea on exertion, cough, or hemoptysis  Cardiovascular: negative  Gastrointestinal: negative  Genitourinary: as above  Musculoskeletal: negative  Neurologic: negative  Psychiatric: negative  Hematologic/Lymphatic/Immunologic: negative  Endocrine: negative            Physical Exam:   VS:  T: 98.3    HR: 101    BP: 113/73    RR: 16   GEN:  AOx3.  NAD.  Pleasant.  HEENT:  Sclerae anicteric.  Conjunctivae pink.  Moist mucous membranes  NECK:  Supple.  No lymphadenopathy.  CV:  RRR  LUNGS: Non-labored breathing.  BACK:  No costoverterbral tenderness BL.  ABD:  Soft.  NT.  ND.  No rebound or guarding.  No surgical scars.   EXT:  Warm, well perfused.    SKIN:  Warm.  Dry.  No rashes.  NEURO:  CN grossly intact.  Uses a cane            Data:   All laboratory data reviewed:    Recent Labs   Lab 06/27/25  0116   WBC 10.8   HGB 12.2          Recent Labs   Lab 06/27/25  0116      POTASSIUM 3.7   CHLORIDE 101   CO2 21*   BUN 11.4   CR 0.73   GLC 95   RUPA 9.3       Recent Labs   Lab 06/27/25  0146   COLOR Light Yellow   APPEARANCE Clear   URINEGLC Negative   URINEBILI Negative   URINEKETONE Negative   SG 1.015   URINEPH 7.0   PROTEIN Negative   NITRITE Negative   LEUKEST Negative   RBCU 0   WBCU <1     Results for orders placed or performed in visit on 10/25/18   Urine Culture Aerobic Bacterial    Collection Time: 10/25/18  4:05 PM    Specimen: Midstream  Urine   Result Value Ref Range    Specimen Description Midstream Urine     Special Requests Specimen received in preservative     Culture Micro >100,000 colonies/mL  Escherichia coli   (A)     Culture Micro       <10,000 colonies/mL  urogenital ant  Susceptibility testing not routinely done         Susceptibility    Escherichia coli - MARISA     Ampicillin >=32 Resistant ug/mL     Cefazolin* <=4 Susceptible ug/mL      * Cefazolin MARISA breakpoints are for the treatment of uncomplicated urinary tract infections.  For the treatment of systemic infections, please contact the laboratory for additional testing.     Cefoxitin <=4 Susceptible ug/mL     Ceftazidime <=1 Susceptible ug/mL     Ceftriaxone <=1 Susceptible ug/mL     Ciprofloxacin <=0.25 Susceptible ug/mL     Gentamicin <=1 Susceptible ug/mL     Levofloxacin <=0.12 Susceptible ug/mL     Nitrofurantoin <=16 Susceptible ug/mL     Tobramycin <=1 Susceptible ug/mL     Trimethoprim/Sulfamethoxazole <=1/19 Susceptible ug/mL     Ampicillin/Sulbactam 8 Susceptible ug/mL     Piperacillin/Tazo <=4 Susceptible ug/mL     Cefepime <=1 Susceptible ug/mL       All pertinent imaging reviewed:    MRI cervical spine  IMPRESSION:  1.  Extensive T2 hyperintense lesion in the mid to upper cervical cord. No definite abnormal enhancement on the current examination.     2.  Patchy T2 hyperintensities in the lower cervical cord are also grossly unchanged.

## 2025-06-27 NOTE — PLAN OF CARE
Goal Outcome Evaluation:      Plan of Care Reviewed With: patient    Overall Patient Progress: improvingOverall Patient Progress: improving     Shift w pt: approx [id number]    Pt arrived to Obs unit 1A at approx 1520 from ED, report received prior to transfer. Pt arrived via bed w all belongings and w sister accompanying. Pt introduced to applicable staff, oriented to room, and given time to ask questions. VS were then taken and RN assessment was completed.     No acute events took place this shift. Pt voided w this RN x1 soon after arrival, bladder scan showed 78 mL PVR. Delaying butler placement for now, unless pt no longer able to pass urine w s/s of retention. Plan to teach pt to self cath per pt request and by approval of team, basic education began, continue tomorrow. Continue trial of void overnight.

## 2025-06-27 NOTE — PLAN OF CARE
OBS Goals Status:    Urology consultation: Completed per team  Neurology plan in place: in progress  Patient able to perform straight cath independently: in progress, teaching began    Nurse to notify provider when observation goals have been met and patient is ready for discharge.

## 2025-06-28 ENCOUNTER — APPOINTMENT (OUTPATIENT)
Dept: MRI IMAGING | Facility: CLINIC | Age: 25
End: 2025-06-28
Attending: PHYSICIAN ASSISTANT
Payer: COMMERCIAL

## 2025-06-28 LAB
ANION GAP SERPL CALCULATED.3IONS-SCNC: 10 MMOL/L (ref 7–15)
APPEARANCE CSF: CLEAR
BUN SERPL-MCNC: 9.2 MG/DL (ref 6–20)
C GATTII+NEOFOR DNA CSF QL NAA+NON-PROBE: NEGATIVE
CALCIUM SERPL-MCNC: 9.4 MG/DL (ref 8.8–10.4)
CHLORIDE SERPL-SCNC: 103 MMOL/L (ref 98–107)
CMV DNA CSF QL NAA+NON-PROBE: NEGATIVE
COLOR CSF: COLORLESS
CREAT SERPL-MCNC: 0.73 MG/DL (ref 0.51–0.95)
E COLI K1 AG CSF QL: NEGATIVE
EGFRCR SERPLBLD CKD-EPI 2021: >90 ML/MIN/1.73M2
EV RNA SPEC QL NAA+PROBE: NEGATIVE
GLUCOSE CSF-MCNC: 60 MG/DL (ref 40–70)
GLUCOSE SERPL-MCNC: 93 MG/DL (ref 70–99)
GP B STREP DNA CSF QL NAA+NON-PROBE: NEGATIVE
HAEM INFLU DNA CSF QL NAA+NON-PROBE: NEGATIVE
HCO3 SERPL-SCNC: 24 MMOL/L (ref 22–29)
HHV6 DNA CSF QL NAA+NON-PROBE: NEGATIVE
HOLD SPECIMEN: NORMAL
HOLD SPECIMEN: NORMAL
HSV1 DNA CSF QL NAA+NON-PROBE: NEGATIVE
HSV2 DNA CSF QL NAA+NON-PROBE: NEGATIVE
L MONOCYTOG DNA CSF QL NAA+NON-PROBE: NEGATIVE
N MEN DNA CSF QL NAA+NON-PROBE: NEGATIVE
PARECHOVIRUS A RNA CSF QL NAA+NON-PROBE: NEGATIVE
POTASSIUM SERPL-SCNC: 4.5 MMOL/L (ref 3.4–5.3)
PROT CSF-MCNC: 12.5 MG/DL (ref 15–45)
RBC # CSF MANUAL: 0 /UL (ref 0–2)
S PNEUM DNA CSF QL NAA+NON-PROBE: NEGATIVE
SODIUM SERPL-SCNC: 137 MMOL/L (ref 135–145)
TUBE # CSF: 4
VZV DNA CSF QL NAA+NON-PROBE: NEGATIVE
WBC # CSF MANUAL: 0 /UL (ref 0–5)

## 2025-06-28 PROCEDURE — 99204 OFFICE O/P NEW MOD 45 MIN: CPT | Mod: FS | Performed by: PHYSICIAN ASSISTANT

## 2025-06-28 PROCEDURE — 62270 DX LMBR SPI PNXR: CPT | Performed by: STUDENT IN AN ORGANIZED HEALTH CARE EDUCATION/TRAINING PROGRAM

## 2025-06-28 PROCEDURE — A9585 GADOBUTROL INJECTION: HCPCS | Performed by: PHYSICIAN ASSISTANT

## 2025-06-28 PROCEDURE — 72158 MRI LUMBAR SPINE W/O & W/DYE: CPT

## 2025-06-28 PROCEDURE — 84157 ASSAY OF PROTEIN OTHER: CPT | Performed by: PHYSICIAN ASSISTANT

## 2025-06-28 PROCEDURE — 62270 DX LMBR SPI PNXR: CPT

## 2025-06-28 PROCEDURE — 255N000002 HC RX 255 OP 636: Performed by: PHYSICIAN ASSISTANT

## 2025-06-28 PROCEDURE — 250N000013 HC RX MED GY IP 250 OP 250 PS 637: Performed by: PHYSICIAN ASSISTANT

## 2025-06-28 PROCEDURE — 72158 MRI LUMBAR SPINE W/O & W/DYE: CPT | Mod: 26 | Performed by: RADIOLOGY

## 2025-06-28 PROCEDURE — 82945 GLUCOSE OTHER FLUID: CPT | Performed by: PHYSICIAN ASSISTANT

## 2025-06-28 PROCEDURE — 89050 BODY FLUID CELL COUNT: CPT | Performed by: PHYSICIAN ASSISTANT

## 2025-06-28 PROCEDURE — 80048 BASIC METABOLIC PNL TOTAL CA: CPT | Performed by: PHYSICIAN ASSISTANT

## 2025-06-28 PROCEDURE — 36415 COLL VENOUS BLD VENIPUNCTURE: CPT | Performed by: PHYSICIAN ASSISTANT

## 2025-06-28 PROCEDURE — 87205 SMEAR GRAM STAIN: CPT | Performed by: PHYSICIAN ASSISTANT

## 2025-06-28 PROCEDURE — G0378 HOSPITAL OBSERVATION PER HR: HCPCS

## 2025-06-28 PROCEDURE — 87483 CNS DNA AMP PROBE TYPE 12-25: CPT | Performed by: PHYSICIAN ASSISTANT

## 2025-06-28 PROCEDURE — 99233 SBSQ HOSP IP/OBS HIGH 50: CPT | Mod: GC | Performed by: STUDENT IN AN ORGANIZED HEALTH CARE EDUCATION/TRAINING PROGRAM

## 2025-06-28 PROCEDURE — 87798 DETECT AGENT NOS DNA AMP: CPT | Performed by: PHYSICIAN ASSISTANT

## 2025-06-28 PROCEDURE — 99207 PR APP CREDIT; MD BILLING SHARED VISIT: CPT | Performed by: STUDENT IN AN ORGANIZED HEALTH CARE EDUCATION/TRAINING PROGRAM

## 2025-06-28 RX ORDER — OLANZAPINE 2.5 MG/1
5 TABLET, FILM COATED ORAL
Status: DISCONTINUED | OUTPATIENT
Start: 2025-06-28 | End: 2025-06-29 | Stop reason: HOSPADM

## 2025-06-28 RX ORDER — GADOBUTROL 604.72 MG/ML
7.5 INJECTION INTRAVENOUS ONCE
Status: COMPLETED | OUTPATIENT
Start: 2025-06-28 | End: 2025-06-28

## 2025-06-28 RX ADMIN — GABAPENTIN 100 MG: 100 CAPSULE ORAL at 20:52

## 2025-06-28 RX ADMIN — IVABRADINE 5 MG: 5 TABLET, FILM COATED ORAL at 07:56

## 2025-06-28 RX ADMIN — IVABRADINE 5 MG: 5 TABLET, FILM COATED ORAL at 18:07

## 2025-06-28 RX ADMIN — LORAZEPAM 1 MG: 1 TABLET ORAL at 18:07

## 2025-06-28 RX ADMIN — GABAPENTIN 100 MG: 100 CAPSULE ORAL at 07:58

## 2025-06-28 RX ADMIN — OLANZAPINE 5 MG: 2.5 TABLET, FILM COATED ORAL at 07:58

## 2025-06-28 RX ADMIN — GABAPENTIN 100 MG: 100 CAPSULE ORAL at 14:24

## 2025-06-28 RX ADMIN — CLONAZEPAM 1 MG: 1 TABLET ORAL at 07:56

## 2025-06-28 RX ADMIN — CLONAZEPAM 1 MG: 1 TABLET ORAL at 23:01

## 2025-06-28 RX ADMIN — GADOBUTROL 7.5 ML: 604.72 INJECTION INTRAVENOUS at 19:45

## 2025-06-28 ASSESSMENT — ACTIVITIES OF DAILY LIVING (ADL)
ADLS_ACUITY_SCORE: 58
ADLS_ACUITY_SCORE: 60
ADLS_ACUITY_SCORE: 58
ADLS_ACUITY_SCORE: 60
ADLS_ACUITY_SCORE: 58
ADLS_ACUITY_SCORE: 60
ADLS_ACUITY_SCORE: 60
ADLS_ACUITY_SCORE: 58
ADLS_ACUITY_SCORE: 60
ADLS_ACUITY_SCORE: 60
ADLS_ACUITY_SCORE: 58
ADLS_ACUITY_SCORE: 60
ADLS_ACUITY_SCORE: 58

## 2025-06-28 NOTE — PROGRESS NOTES
[facility]  General Neurology - Progress Note    Patient Name:  Cindi Ferrer  Date of Service:  June 28, 2025    Subjective:    Nursing notes reviewed, no acute events overnight. Successful lumbar puncture earlier this morning.  She reports feeling even better than yesterday, describing more stability with walking and improved strength.  Did have a single successful attempt at emptying bladder, otherwise requiring intermittent self catheterization.    Objective:    Vitals: /68 (BP Location: Right arm)   Pulse 59   Temp 98  F (36.7  C) (Oral)   Resp 16   SpO2 100%   General: Lying in bed, NAD  Head: Atraumatic, normocephalic   Cardiac: no lower extremity edema  Neurologic:  Mental Status: Fully alert, attentive and oriented. Speech clear and fluent.   Cranial Nerves: EOM intact, without nystagmus. Facial movements symmetric at rest and with activation.   Motor: No abnormal movements.  Moving all 4 extremities antigravity.   Sensory: Reduced sensation to light touch throughout the right hemibody compared to the left, though improved compared to yesterday  Station/Gait:  able to stand on tiptoes and heels as well as walk unassisted without the cane    Pertinent Investigations:    I have personally reviewed most recent and pertinent labs, tests, and radiological images.     Lumbar puncture  - Glucose 60  - Protein 12.5  - Cell count 0 nucleated cells and 0 RBCs  - Gram stain no organisms or white blood cells seen  - Meningitis encephalitis panel negative  - ADRIANA virus pending    Assessment:  24 year old female with PMHx pertinent for relapsing and remitting multiple sclerosis diagnosed 8 years ago on ocrelizumab who presents to the ED with worsening left-sided weakness and urinary retention. Updated spine imaging shows stable cervical cord lesion when compared to 2018 and without any contrast enhancement.  While his cervical cord lesion could certainly explain urinary  retention, it is peculiar that she did not experience urinary retention with the initial onset of the lesion and is now experiencing retention without abnormal contrast signal to explain the new symptom.    In light of this clinical and radiographic discrepancy, would consider alternative explanations for her urinary retention, including medication/drug side effect.  Recent changes include discontinuing aripiprazole, starting ivabradine, starting scheduled clonazepam. Though not common, urinary edition has been reported as a side effect of aripiprazole and from clonazepam. Additionally, she does report a single use of cannabis in late May, denying any ongoing use.  If she is, in fact, using marijuana more frequently, could also contribute to urinary retention.    Lumbar puncture obtained without evidence of acute infection to explain her urinary retention.  With this event, would be safe to discharge from neurologic perspective.  Will defer management of urinary retention to urology colleagues.  She should follow-up with her outpatient neurologist, Dr. Wilson, as scheduled.      Recommendations:  #Acute urinary retention  #History of relapsing and remitting multiple sclerosis  - Abstain from cannabis use  - No indication for IV steroids or ongoing steroid taper at this time  - Follow-up with PCP and/or psychiatry regarding medications for mood  - Urology referral for consideration of urodynamic testing  - Outpatient neurology follow-up as scheduled  - Continue Ocrevus, next infusion scheduled for 7/30    Patient was seen and discussed with Dr. Rodrigues. The neurology service will sign off at this time. Please contact our team with any further questions.    Jb Fajardo MD  Resident Physician, PGY-2  Department of Neurology    Dragon disclaimer: This documentation was completed with the aid of dictation software.  Please note that there may be some inconsistencies due to software errors.  Errors are  corrected in real time, however if there is a remaining error, please do not hesitate to reach out for clarification.

## 2025-06-28 NOTE — PLAN OF CARE
Goal Outcome Evaluation:    [id number]  Pt. A&O, VSS   Patient educated on how to use incentive spirometer. Patient verbalized understanding and demonstrated proper use. Emphasized importance and usage of device, with coughing and deep breathing every 2 hours while awake.

## 2025-06-28 NOTE — PROGRESS NOTES
[facility]    Medicine Progress Note - Hospitalist Service    Date of Admission:  6/27/2025    Assessment & Plan   Cindi Ferrer is a 24 year old woman with a history relapsing remitting multiple sclerosis, executive function deficit, depression, anxiety, PTSD, psychosis, who was admitted to Medicine Observation with urinary retention.      # Acute urinary retention  # Multiple sclerosis with extensive T2 lesions  Relapsing remitting MS, diagnosed 8 years ago. On ocrelizumab, most recent dose in January. Recently admitted to OSH on 6/20/25 for bilateral upper extremity paresthesias, back pain, and urinary incontinence with CT concerning for possible active demyelination at C5-6 and was treated with steroids. She now presented with left sided weakness and urinary retention. Urology was consulted and suspected both stress incontinence from a weak sphincter versus overflow incontinence related to her cervical cord lesion and/or MS. Reza catheter was placed. Neurology was consulted and per recommendations, she underwent an LP today to rule out meningitis and other infections.      - Neurology consulted, no evidence of acute MS flare at this time. LP done this morning, results pending   - Urology consult  - Cervical MRI on 6/27/25 revealed extensive signal abnormality at C2-C4 along with T2 hyperintensities. These were felt to be stable when compared to prior MRI.  - She decided against a Reza catheter is working on intermittent catheterization training with nursing staff   - Lumbar MRI to rule-out other spinal pathology  - Follow up scheduled with Neurology as an outpatient on 7/2/8/25  - Follow up with your next Ocrevus infusion on 7/30/25       # Depression and Anxiety  # PTSD  -Continue PTA regimen of  clonazepam 1mg twice daily, gabapentin 100mg three times daily.   -Adjust Zyprexa to 5mg once daily in the evening for nausea / anxiety as needed (made her too sleepy  during the day)       Observation Goals: Urology consultation completed, Neurology plan in place, Patient able to perform straight cath independently, Nurse to notify provider when observation goals have been met and patient is ready for discharge.  Diet: Regular Diet Adult    DVT Prophylaxis: Pneumatic Compression Devices  Reza Catheter: Not present  Lines: None     Cardiac Monitoring: None  Code Status: Full Code        Disposition Plan     Medically Ready for Discharge: Plan for discharge home tomorrow if LP culture results are negative and catheterization training goes well, supplies are ready      ROBEL Mercado  Hospitalist Service  [facility]  Securely message with Heysan (more info)  Text page via [facility] Paging/Directory   ______________________________________________________________________    Interval History   Ms. Ferrer was resting in bed this morning after completing her lumbar puncture. She denies any headache, vision changes, chest pain, shortness of breath, fevers, chills. We reviewed the results of her MRIs from yesterday, plans for CSF fluid analysis and catheterization training. She denied any other new questions or concerns today.    Physical Exam   Vital Signs: Temp: 98  F (36.7  C) Temp src: Oral BP: 110/68 Pulse: 59   Resp: 16 SpO2: 100 % O2 Device: None (Room air)    Weight: 0 lbs 0 oz    General Appearance: 24 year old female resting in bed after completing her lumbar puncture, denies any pain currently  Respiratory: breathing comfortably on room air, no adventitious sounds to bilateral auscultation  Cardiovascular: regular rate and rhythm, no appreciable murmurs, rubs or gallops      Medical Decision Making       40 MINUTES SPENT BY ME on the date of service doing chart review, history, exam, documentation & further activities per the note.      Data     I have personally reviewed the following data over the past 24 hrs:    N/A  \   N/A    / N/A     137 103 9.2 /  93   4.5 24 0.73 \     INR:  1.03 PTT:  N/A   D-dimer:  N/A Fibrinogen:  N/A

## 2025-06-28 NOTE — CARE PLAN
Observation goals:  Urology consultation completed in progress  Neurology plan in place not met  Patient able to perform straight cath independently in progress

## 2025-06-28 NOTE — PROGRESS NOTES
Observation goals       -Urology consultation completed. In progress  -Neurology plan in place. Not met   -Patient able to perform straight cath independently. Verbal explanation and demonstration done, pt yet to do return demonstration.

## 2025-06-28 NOTE — PROVIDER NOTIFICATION
MD notified:yeimi charles  patient had an episode of urinary incontinence. stays she cannot feel when she goes. also no BM says she feels like she doesnt have control of bowel.

## 2025-06-28 NOTE — PLAN OF CARE
Goal Outcome Evaluation:    8873-1535  /65 (BP Location: Right arm)   Pulse 57   Temp 98.6  F (37  C) (Oral)   Resp 16   SpO2 95%     Observation goals       Urology consultation completed. In progress  Neurology plan in place. Not met   Patient able to perform straight cath independently. Started learning it    Pt. A&O, VSS, RA. Denies pain and SOB. Up independently to the bathroom with cane. Pt has tried x3 to spontaneously void, but she is not uri to. Pt has straight cath order in place. Last bladder scan @ 2030 was 407. Pt. agreed earlier to do straight cath and also learn how to do it, but after I got the supplies, she refused, stating she want to keeping on trying spontaneously. She ambulated x2 to enable her void. When she couldn't void, she decided to do the straight cath. The incoming nurse is updated  Continue with poc.

## 2025-06-28 NOTE — PLAN OF CARE
Goal Outcome Evaluation:      Plan of Care Reviewed With: patient    Overall Patient Progress: no change    Outcome Evaluation:      Shift: 1036-5106  VS: Stable on RA, afebrile  Neuro: A&Ox4  Pain/Nausea: Denies nausea/pain, however complains of abd discomfort (kaden when bladder is full)  Diet: Regular  IV Access: PIV  GI/: Intermittent straight catheterization done this shift  Skin: No new issues  Mobility: Walks with cane  Education: Self Straight cath education was verbally explained and demonstrated. However pt stated she is too tired to do a return demonstration. She stated she would like to straight cath herself during the morning.  Plan: Continue POC

## 2025-06-28 NOTE — PROGRESS NOTES
Observation goals       -Urology consultation completed. In progress  -Neurology plan in place. Not met   -Patient able to perform straight cath independently. Verbal explanation and demonstration done, pt yet to do return demonstration.  it

## 2025-06-28 NOTE — PROCEDURES
[facility]  CAPS PROCEDURE NOTE  Date of Admission:  6/27/2025  Consult Requested by: Medicine  Reason for Consult: diagnostic lumbar puncture    Indication/HPI: Evaluate for urinary retention     Pre-Procedure Diagnosis: As above     Post-Procedure Diagnosis: As Above     Risk Assessment: Average risk, Technically straightforward; patient's anticoagulation has been held according to guidelines based on the agent and platelets and coags are within guidelines    Procedure Outcome:  Successful lumbar puncture at L5 - S1 for 12 mL of CSF using LP Approach: Ultrasound assisted paramedian approach.   See additional procedure details below.    The primary covering service should follow up and address any lab results as appropriate.    DEVANTE MACARIO MD  [facility]  Securely message with Vocera (more info)  Text page via [employer] Paging/Directory   See signed in provider for up to date coverage information      [facility]    Lumbar puncture    Date/Time: 6/28/2025 10:05 AM    Performed by: Devante Macario MD  Authorized by: Devante Macario MD  Indications: evaluation for infection  Preparation: Patient was prepped and draped in the usual sterile fashion.      UNIVERSAL PROTOCOL   Site Marked: Yes  Prior Images Obtained and Reviewed:  Yes  Required items: Required blood products, implants, devices and special equipment available    Patient identity confirmed:  Verbally with patient, arm band, provided demographic data and hospital-assigned identification number  NA - No sedation, light sedation, or local anesthesia  Confirmation Checklist:  Patient's identity using two indicators, relevant allergies, procedure was appropriate and matched the consent or emergent situation and correct equipment/implants were available  Time out: Immediately prior to the procedure a time out was called    Universal  Protocol: the Joint Commission Universal Protocol was followed    Preparation: Patient was prepped and draped in usual sterile fashion    ESBL (mL):  1     ANESTHESIA    Local Anesthetic:  Lidocaine 1% without epinephrine  Anesthetic Total (mL):  10      SEDATION    Patient Sedated: No      PROCEDURE DETAILS  Lumbar space: L5-S1.  Patient's position: left lateral decubitus  Needle gauge: 22  Needle type: spinal needle - Quincke tip  Needle length: 3.5 in  Number of attempts: 1  Opening pressure: 20 cm H2O  Fluid appearance: clear  Tubes of fluid: 4  Total volume: 12 ml  Puncture site post-procedure: Sterile Bandaid.      PROCEDURE    Patient Tolerance:  Patient tolerated the procedure well with no immediate complications  Length of time physician/provider present for 1:1 monitoring during sedation: 0        No results found for this or any previous visit from the past 1 day.

## 2025-06-29 VITALS
OXYGEN SATURATION: 100 % | TEMPERATURE: 97.6 F | HEART RATE: 66 BPM | SYSTOLIC BLOOD PRESSURE: 99 MMHG | DIASTOLIC BLOOD PRESSURE: 62 MMHG | RESPIRATION RATE: 14 BRPM

## 2025-06-29 PROCEDURE — 250N000013 HC RX MED GY IP 250 OP 250 PS 637: Performed by: PHYSICIAN ASSISTANT

## 2025-06-29 PROCEDURE — G0378 HOSPITAL OBSERVATION PER HR: HCPCS

## 2025-06-29 PROCEDURE — 99239 HOSP IP/OBS DSCHRG MGMT >30: CPT | Performed by: PHYSICIAN ASSISTANT

## 2025-06-29 RX ADMIN — GABAPENTIN 100 MG: 100 CAPSULE ORAL at 08:46

## 2025-06-29 RX ADMIN — GABAPENTIN 100 MG: 100 CAPSULE ORAL at 14:49

## 2025-06-29 RX ADMIN — CLONAZEPAM 1 MG: 1 TABLET ORAL at 08:45

## 2025-06-29 RX ADMIN — IVABRADINE 5 MG: 5 TABLET, FILM COATED ORAL at 08:45

## 2025-06-29 ASSESSMENT — ACTIVITIES OF DAILY LIVING (ADL)
ADLS_ACUITY_SCORE: 60

## 2025-06-29 NOTE — CARE PLAN
Observation goals  Urology consultation completed met  Neurology plan in place met  Patient able to perform straight cath independently in [facility]

## 2025-06-29 NOTE — CARE PLAN
Observation goals:   Urology consultation completed met  Neurology plan in place in progress  Patient able to perform straight cath independently in progress

## 2025-06-29 NOTE — PROGRESS NOTES
Goal Outcome Evaluation:    Urology consultation completed: Met  Neurology plan in place: Progressing  Patient able to perform straight cath independently: Progressing

## 2025-06-29 NOTE — PROGRESS NOTES
Goal Outcome Evaluation:    Urology consultation completed: Met  Neurology plan in place: Progressing  Patient able to perform straight cath independently: Progressing     Blood pressure 102/63, pulse 62, temperature 97.9  F (36.6  C), temperature source Oral, resp. rate 16, SpO2 98%, not currently breastfeeding.

## 2025-06-29 NOTE — PROGRESS NOTES
Shift: 1900-0730  Neuro: A&Ox4.   Cardiac: Heart rate regular   Respiratory:  Pt 02 Sat %. Pt on RA.  GI/: No complain of abdominal pain and nausea, Adequate urine output. BM X1  Diet/appetite: Regular diet.   Activity:  Assist of one with can.  Pain: No significant report of pain. Pt took schedule.   Skin: No new deficits noted.  LDA's: PIV saline locked    Plan: Continue with POC. Pt is improving, Pt voided and had bowel movement. Encourage Pt to use the bathroom. Continue plan of care.    Blood pressure 102/63, pulse 62, temperature 97.9  F (36.6  C), temperature source Oral, resp. rate 16, SpO2 98%, not currently breastfeeding.

## 2025-06-29 NOTE — DISCHARGE SUMMARY
[facility]  Hospitalist Discharge Summary      Date of Admission:  6/27/2025  Date of Discharge:  6/29/2025  Discharging Provider: ROBEL Mercado  Discharge Service: Hospitalist Service    Discharge Diagnoses   # Acute urinary retention  # Multiple sclerosis with extensive T2 lesions  # Depression and Anxiety  # PTSD    Follow-ups Needed After Discharge   Follow-up Appointments       ADULT [facility] Specialty Follow-up and recommended labs and tests      Follow up with Neurology on 7/28/25   Follow up with the Infusion Center on 7/30/25  Follow up with Urology within one month of discharge to evaluate progress of your retention.    Appointments on [place] and/or [facility] (with [facility] or [facility] provider or service). Call [phone] if you haven't heard regarding these appointments within 7 days of discharge.                Unresulted Labs Ordered in the Past 30 Days of this Admission       Date and Time Order Name Status Description    6/27/2025  2:24 PM ADRIANA Virus by PCR In process     6/27/2025  2:24 PM Cerebrospinal fluid Aerobic Bacterial Culture Routine With Gram Stain Preliminary             Discharge Disposition   Discharged to home  Condition at discharge: Stable    Hospital Course   Cindi Ferrer is a 24 year old woman with a history relapsing remitting multiple sclerosis, executive function deficit, depression, anxiety, PTSD, psychosis, who was admitted to Medicine Observation with urinary retention. She underwent a work up to rule out infection including a UA with culture and lumbar puncture with no clear evidence of infection. Urology was consulted and recommended a short term Reza catheter, however, Ms. Ferrer prefers to self catheterize for now and will be sent home with supplies to do so and demonstrated skills to self cath prior to discharge. Neurology will follow up with the patient within one month and Urology will follow up with the  patient within one month for ongoing discussions about her MS and urinary retention.     - Follow up scheduled with Neurology as an outpatient on 7/2/8/25  - Follow up with your next Ocrevus infusion on 7/30/25  -Continue PTA regimen of  clonazepam 1mg twice daily, gabapentin 100mg three times daily.   -Adjust Zyprexa to 5mg once daily in the evening for nausea / anxiety as needed (made her too sleepy during the day)  - Self catheterization supplies sent home with patient       Consultations This Hospital Stay   NEUROLOGY GENERAL ADULT IP CONSULT  UROLOGY IP CONSULT  INTERNAL MEDICINE PROCEDURE TEAM ADULT IP CONSULT - LUMBAR PUNCTURE    Code Status   Full Code    Time Spent on this Encounter   IMichelle PA, personally saw the patient today and spent greater than 30 minutes discharging this patient.       ROBEL Mercado  [facility] [address] OBSERVATION  [address] [address]  Phone: [phone]  Fax: [phone]  ______________________________________________________________________    Physical Exam   Vital Signs: Temp: 97.6  F (36.4  C) Temp src: Oral BP: 99/62 Pulse: 66   Resp: 14 SpO2: 100 % O2 Device: None (Room air)    Weight: 0 lbs 0 oz     General Appearance: 24 year old female resting at the edge of her bed, no acute distress, more energized and asking to home  Respiratory: breathing comfortably on room air, no adventitious sounds to bilateral auscultation  Cardiovascular: regular rate and rhythm, no appreciable murmurs, rubs or gallops          Primary Care Physician   Viviane England    Discharge Orders      Reason for your hospital stay    You were hospitalized with urinary retention     Activity    Your activity upon discharge: activity as tolerated     ADULT [facility] Specialty Follow-up and recommended labs and tests    Follow up with Neurology on 7/28/25   Follow up with the Infusion Center on 7/30/25  Follow up with Urology within one month of  discharge to evaluate progress of your retention.    Appointments on [place] and/or [facility] (with [facility] or [facility] provider or service). Call [phone] if you haven't heard regarding these appointments within 7 days of discharge.     Miscellaneous DME Order    DME Documentation:   Describe the reason for need to support medical necessity: urinary retention in the setting of multiple sclerosis    I, the undersigned, certify that the above prescribed supplies are medically necessary for this patient and is both reasonable and necessary in reference to accepted standards of medical and necessary in reference to accepted standards of medical practice in the treatment of this patient's condition and is not prescribed as a convenience.     Catheterization Supplies Order for DME - ONLY FOR DME    I, the undersigned, certify that the above prescribed supplies are medically necessary for this patient and is both reasonable and necessary in reference to accepted standards of medical and necessary in reference to accepted standards of medical practice in the treatment of this patient's condition and is not prescribed as a convenience.      Diet    Follow this diet upon discharge: Current Diet:Orders Placed This Encounter      Regular Diet Adult       Significant Results and Procedures   Most Recent 3 CBC's:  Recent Labs   Lab Test 06/27/25 0116 05/09/24  1053 10/12/23  1712   WBC 10.8 8.6 8.5   HGB 12.2 12.6 12.8   MCV 82 86 86    312 325     Most Recent 3 BMP's:  Recent Labs   Lab Test 06/28/25  0707 06/27/25  2153 06/27/25  0116 10/05/17  2045     --  135 140   POTASSIUM 4.5  --  3.7 3.8   CHLORIDE 103  --  101 105   CO2 24  --  21* 27   BUN 9.2  --  11.4 6*   CR 0.73  --  0.73 0.54   ANIONGAP 10  --  13 8   RUPA 9.4  --  9.3 8.7*   GLC 93 107* 95 79     Most Recent 2 LFT's:  Recent Labs   Lab Test 06/27/25 0116 05/09/24  1053   AST 19 18   ALT 18 13   ALKPHOS 79 98   BILITOTAL 0.3 0.3     Most Recent  3 INR's:  Recent Labs   Lab Test 06/27/25  1806   INR 1.03       Discharge Medications      Review of your medicines        CONTINUE these medicines which have NOT CHANGED        Dose / Directions   clonazePAM 1 MG tablet  Commonly known as: klonoPIN      Dose: 1 mg  Take 1 mg by mouth 2 times daily.  Refills: 0     ferrous sulfate 325 (65 Fe) MG tablet  Commonly known as: FEROSUL      Dose: 325 mg  Take 325 mg by mouth daily.  Refills: 0     gabapentin 100 MG capsule  Commonly known as: NEURONTIN      Dose: 100 mg  Take 100 mg by mouth 3 times daily.  Refills: 0     ivabradine 5 MG tablet  Commonly known as: CORLANOR  Used for: Syncope and collapse, Inappropriate sinus tachycardia, POTS (postural orthostatic tachycardia syndrome), Positional lightheadedness      Dose: 5 mg  Take 1 tablet (5 mg) by mouth 2 times daily (with meals).  Quantity: 60 tablet  Refills: 11     ocrelizumab 300 MG/10ML injection  Commonly known as: OCREVUS      Dose: 600 mg  Inject 600 mg into the vein every 6 months.  Refills: 0     OLANZapine 5 MG tablet  Commonly known as: zyPREXA      Dose: 5 mg  Take 5 mg by mouth every morning.  Refills: 0     ondansetron 4 MG ODT tab  Commonly known as: ZOFRAN ODT      Dose: 4 mg  Take 4 mg by mouth every 8 hours as needed for nausea.  Refills: 0     order for DME  Used for: MS (multiple sclerosis) (H)      Equipment being ordered: Wheelchair (manual)  Quantity: 1 Device  Refills: 0     vitamin D3 50 mcg (2000 units) tablet  Commonly known as: CHOLECALCIFEROL  Used for: Vitamin D deficiency      Dose: 1 tablet  Take 1 tablet (50 mcg) by mouth daily  Quantity: 90 tablet  Refills: 3            Allergies   No Known Allergies

## 2025-06-30 ENCOUNTER — TELEPHONE (OUTPATIENT)
Dept: UROLOGY | Facility: CLINIC | Age: 25
End: 2025-06-30
Payer: COMMERCIAL

## 2025-06-30 NOTE — TELEPHONE ENCOUNTER
Patient confirmed scheduled appointment:  Date: 8/8  Time: 11:30am  Visit type: NEW UROLOGY  Provider: Ani Cardenas  Location: Virtual  Testing/imaging:   Additional notes:

## 2025-07-01 ENCOUNTER — RESULTS FOLLOW-UP (OUTPATIENT)
Dept: FAMILY MEDICINE | Facility: CLINIC | Age: 25
End: 2025-07-01

## 2025-07-01 ENCOUNTER — PATIENT OUTREACH (OUTPATIENT)
Dept: CARE COORDINATION | Facility: CLINIC | Age: 25
End: 2025-07-01
Payer: COMMERCIAL

## 2025-07-01 LAB — JCPYV DNA SPEC QL NAA+PROBE: NOT DETECTED

## 2025-07-01 NOTE — PROGRESS NOTES
Connected Care Resource Center Contact  [facility]/Voicemail     Clinical Data: Post-Discharge Outreach     Outreach attempted x 2.  Left message on patient's voicemail, providing [facility]'s central phone number of [phone] ([phone]) for questions/concerns and/or to schedule an appt with an [facility] provider, if they do not have a PCP.      Plan:  [facility] will do no further outreaches at this time.       SYD Vega  Connected Care Resource Center, [facility]    *Connected Care Resource Team does NOT follow patient ongoing. Referrals are identified based on internal discharge reports and the outreach is to ensure patient has an understanding of their discharge instructions.

## 2025-07-03 LAB
BACTERIA CSF CULT: NO GROWTH
GRAM STAIN RESULT: NORMAL
GRAM STAIN RESULT: NORMAL

## 2025-07-24 ENCOUNTER — TELEPHONE (OUTPATIENT)
Dept: NEUROLOGY | Facility: CLINIC | Age: 25
End: 2025-07-24
Payer: COMMERCIAL

## 2025-07-24 NOTE — TELEPHONE ENCOUNTER
Called [facility] infusion Nelly Rucker, to inquire about next Ocrevus infusion and insurance auth status. We had received a fax from pt's insurance requesting additional information. Spoke with pharmacist, who confirmed pt is scheduled for Ocrevus on 7/30 and there is a recent insurance approval for Ocrevus, good for a year. Discussed that decision will be made following MRI and clinic visit on 7/28 regarding potential switch in therapy, but should keep the 7/30 infusion on the schedule for now.     Jerrica Garner RN

## 2025-07-28 ENCOUNTER — LAB (OUTPATIENT)
Dept: LAB | Facility: CLINIC | Age: 25
End: 2025-07-28
Payer: COMMERCIAL

## 2025-07-28 ENCOUNTER — OFFICE VISIT (OUTPATIENT)
Dept: NEUROLOGY | Facility: CLINIC | Age: 25
End: 2025-07-28
Attending: PSYCHIATRY & NEUROLOGY
Payer: COMMERCIAL

## 2025-07-28 ENCOUNTER — ANCILLARY PROCEDURE (OUTPATIENT)
Dept: MRI IMAGING | Facility: CLINIC | Age: 25
End: 2025-07-28
Attending: PSYCHIATRY & NEUROLOGY
Payer: COMMERCIAL

## 2025-07-28 VITALS
BODY MASS INDEX: 25.69 KG/M2 | SYSTOLIC BLOOD PRESSURE: 105 MMHG | RESPIRATION RATE: 16 BRPM | DIASTOLIC BLOOD PRESSURE: 68 MMHG | OXYGEN SATURATION: 98 % | WEIGHT: 164 LBS | HEART RATE: 73 BPM

## 2025-07-28 DIAGNOSIS — R25.1 TREMOR: ICD-10-CM

## 2025-07-28 DIAGNOSIS — G35 MS (MULTIPLE SCLEROSIS) (H): ICD-10-CM

## 2025-07-28 DIAGNOSIS — G35 MS (MULTIPLE SCLEROSIS) (H): Primary | ICD-10-CM

## 2025-07-28 DIAGNOSIS — R68.89 LIGHT SENSITIVITY: ICD-10-CM

## 2025-07-28 LAB
CD19 B CELL COMMENT: ABNORMAL
CD19 CELLS # BLD: <1 CELLS/UL (ref 107–698)
CD19 CELLS NFR BLD: <1 % (ref 6–27)

## 2025-07-28 PROCEDURE — G0463 HOSPITAL OUTPT CLINIC VISIT: HCPCS | Performed by: PSYCHIATRY & NEUROLOGY

## 2025-07-28 PROCEDURE — 72156 MRI NECK SPINE W/O & W/DYE: CPT | Performed by: RADIOLOGY

## 2025-07-28 PROCEDURE — 36415 COLL VENOUS BLD VENIPUNCTURE: CPT | Performed by: PATHOLOGY

## 2025-07-28 PROCEDURE — 70553 MRI BRAIN STEM W/O & W/DYE: CPT | Mod: GC | Performed by: RADIOLOGY

## 2025-07-28 PROCEDURE — A9585 GADOBUTROL INJECTION: HCPCS | Mod: JZ | Performed by: RADIOLOGY

## 2025-07-28 PROCEDURE — 99000 SPECIMEN HANDLING OFFICE-LAB: CPT | Performed by: PATHOLOGY

## 2025-07-28 PROCEDURE — 99215 OFFICE O/P EST HI 40 MIN: CPT | Performed by: PSYCHIATRY & NEUROLOGY

## 2025-07-28 PROCEDURE — 86355 B CELLS TOTAL COUNT: CPT | Performed by: PSYCHIATRY & NEUROLOGY

## 2025-07-28 PROCEDURE — G2211 COMPLEX E/M VISIT ADD ON: HCPCS | Performed by: PSYCHIATRY & NEUROLOGY

## 2025-07-28 RX ORDER — GADOBUTROL 604.72 MG/ML
7.5 INJECTION INTRAVENOUS ONCE
Status: COMPLETED | OUTPATIENT
Start: 2025-07-28 | End: 2025-07-28

## 2025-07-28 RX ADMIN — GADOBUTROL 7.5 ML: 604.72 INJECTION INTRAVENOUS at 09:11

## 2025-07-28 ASSESSMENT — PAIN SCALES - GENERAL: PAINLEVEL_OUTOF10: NO PAIN (0)

## 2025-07-28 NOTE — PATIENT INSTRUCTIONS
Proceed with the next Ocrevus infusion as scheduled on July 30, 2025    2.   Blood test today    3.   Return to clinic in 6 months

## 2025-07-28 NOTE — DISCHARGE INSTRUCTIONS

## 2025-07-28 NOTE — LETTER
7/28/2025      RE: Cindi Ferrer  [address]  [place] [address]     Referral source: Established patient    Chief complaint: Multiple sclerosis    History of the Present Illness: Ms. Cindi Ferrer is a 24 year old right-handed woman who presents to the Multiple Sclerosis Clinic today for a scheduled follow up visit after MRI scans of the brain and cervical spine performed earlier on today's date.    The patient's history is as per my previous notes.  She initially developed symptoms of demyelinating disease in 2017 when she had horizontal diplopia and left hemibody numbness.  She was admitted to an outside pediatric facility at that time.  MRI scans demonstrated T2 hyperintense lesions suggestive of demyelinating disease of the type seen in multiple sclerosis, and a CSF examination was also positive for oligoclonal bands. She started disease-modifying therapy with natalizumab and did not have further MS relapses on that medication through 2024. We changed her treatment to ocrelizumab in June 2024 due to ADRIANA virus seroconversion. She remains on that medication to date, with the last treatment performed in January.    Unfortunately, she has had a difficult time over the past several months, with three hospitalizations:    1) She was admitted to an outside hospital from 5/27 to 5/29 with symptoms of afua, thought potentially to be an unusual reaction to an antibiotic versus initial manifestation of bipolar disorder.    2) She was admitted again from 6/20 to 6/23 after presenting to an outside ED with left foot drop and gait instability. In retrospect, she thinks that her gait problems started around the middle of June. She went to the ED when she started to feel that she needed to use her sister's crutches for stability. As discussed further below, MRI scan of the cervical spine was interpreted as showing evidence of active demyelination at C5-6. She received 3 days of high dose IV steroids with improvement in  "her symptoms.    3) She returned to our ED on 6/27 with new onset of urinary retention. Images of the brain and spinal cord here were interpreted as unchanged from 2023, with the abnormality found on the outside imaging not identified here. The patient also had a CSF exam on 7/28, which was bland with normal glucose, protein 12.5 mg/dL, and WBC count 0/uL. She did not receive further steroids or other acute therapy, and was discharged home on 6/29.    The patient indicates that since discharge her urinary retention has waxed and waned, and she intermittently needs to self-catheterize. Mobility issues have largely resolved.    In addition to the above, she indicates that she has had worsened \"light sensitivity\" since April. Her previous complaint of tremor recurred around that time as well. She describes this as, \"my hands get warm and tingly and then start twitching\". She is back on clonazepam 1 mg twice daily, which is controlling this symptom.     She was also discharged from the hospital on gabapentin 100 mg TID.    PHYSICAL EXAMINATION:  VITAL SIGNS: Blood pressure 105/68; pulse 73; weight 74.4 kg; respiratory rate 16 breaths/minute; oxygen saturation 98%.  GENERAL: Well-nourished woman who presents to the examination alone, seated in an exam room with the lights dimmed, awake and alert and in no acute distress.    NEUROLOGIC EXAMINATION:  CRANIAL NERVES: Visual fields are full to confrontation.  Extraocular movements are intact with no internuclear ophthalmoplegia.  Facial strength is normal.  Palate elevation and tongue protrusion are normal.  POWER: Strength is within normal limits in proximal and distal muscles in the upper and lower limbs throughout.  REFLEXES: Reflexes are symmetric and within normal limits in the arms and legs.  MOTOR/CEREBELLAR: There is no appendicular ataxia on finger-to-nose testing.  Rapid alternating movements are within normal limits in the hands and fingers.  There is no pronator " "drift in the arms.  GAIT: The patient is able to ambulate on a flat, level surface with no gross loss of postural stability, and able to walk on heels, toes and in tandem.    Investigations: I reviewed multiple MRI scans, including brain MRI scans from 6/20/2025, 6/27/2025, and today's date; cervical MRI scans from 6/20/2025, 6/27/2025, and today's date, and thoracic MRI of 6/27/2025. The following is my independent interpretation of those images. The thoracic MRI is normal. All three brain MRI scans show periventricular and infratentorial foci of T2 hyperintensity, which do not enhance. No new lesions are seen in comparison to earlier MRI of 4/13/2023. The cervical MRI dated 6/20/2025 shows an unusual pattern of curvilinear, punctate contrast enhancement at C5-6. There is no clearly identified corresponding abnormality at this level on T2 sequences. This finding is not seen on subsequent MRI scans from 6/27/2025 and today. Other chronic short segment foci of T2 hyperintensity are unchanged between these studies and in comparison to earlier MRI scan of 4/13/2023, with no other new or enhancing lesions seen.    Assessment/plan:    1. Multiple sclerosis  The apparent lesion in the cord identified on 6/20/2025, seen only on contrast enhanced images, was not evident on subsequent scans and radiologically this would be most suggestive of an imaging artifact.    That being said, her reported symptoms could localize to a lesion in the cord and I suppose it is within the realm of possibility that high dose steroids received during her first June hospitalization suppressed inflammation below the limits of MRI detection. Urinary retention is also a fairly unusual \"functional\" symptom.    Overall, I am not entirely certain if she had an MS relapse in June, or not. If this was the case, this would obviously be concerning and suggest failure of treatment with ocrelizumab.    I discussed alternate treatment options with her " "today, which include:    1) Alemtuzumab (Lemtrada): the standard treatment course of alemtuzumab over one year typically produces a \"remission\" of disease activity lasting for at least several years without need for further treatment. Although this profile is superficially attractive, there are numerous potential side effects including marked lymphopenia after treatments requiring Pneumocystis prophylaxis and valacyclovir prophylaxis, high incidence of other autoimmune diseases post-treatment, and a long term increase in risk of leukemia and lymphoma. Laboratory monitoring (blood and urine) is required monthly at onset of therapy and for four years after the last treatment.    2) Cladribine (Mavenclad): this is also a time-limited therapy given as two treatment courses over two years. This is typically considered a high efficacy MS therapy and could be an option for her, although my experience has been that sustained remissions of disease activity with this drug are not as predictable as with alemtuzumab. There is also considered to be an increased risk of cancer post-cladribine therapy, although this is largely dose-dependent and the actual magnitude of risk with the doses used for MS treatment is likely low.    Other MS therapies (oral and platform injectable drugs) are generally less effective than B cell depleting antibodies, and I would not favor one of these.    We also briefly discussed hematopoetic stem cell transplant; we are no longer enrolling subjects for the BEAT-MS trial at our center, and even if we consider the Elizabet event a clinical relapse at present she does not meet inclusion criteria for the trial.    After discussion, at present she is most comfortable with plan to remain on ocrelizumab. If this agent is not going to be adequate to control her disease, I expect that will become evident over the next 1-2 years and we will need to re-address the above options.    Today, I will check a CD19 count " "to make sure that she is not experiencing early reconstitution of B cells. She is at the end of a treatment \"cycle\", with her next ocrelizumab infusion due to be performed in two days.    I will see her back in 6 months for a review, or sooner as needed.    2. Tremor  I did not see any grossly evident movement abnormality today; she reports that this is suppressed by clonazepam. Her past complaints of tremor appeared to have a functional element, and I am not sure as to the nature of her current symptom. For now, she can continue clonazepam. I would advise trying to wean this as needed and as tolerated.    3. Light sensitivity  Gabapentin is sometimes helpful for this complaint and I told her that we could try increasing this, but she prefers to stay at 100 mg TID for now.    I spent a total of 45 minutes on patient care activities related to this encounter on the date of service, including time spent reviewing the chart, reviewing outside medical records, reviewing neuroimaging, obtaining history and examination from, and in counseling the patient.    The longitudinal plans of care for the diagnoses as documented were addressed during this visit. Due to       the added complexity in care, I will continue to support the patient in subsequent management and with ongoing continuity of care.    ADDENDUM (7-): CD19 count was undetectable (<1 cell/uL), as desired.    Coy Wilson MD   of Neurology  [facility]    Cc:  Viviane England MD (PCP)  Patient      "

## 2025-07-28 NOTE — Clinical Note
7/28/2025       RE: Cindi Ferrer  [address]  [place] [address]     Dear Colleague,    Thank you for referring your patient, Cindi Ferrer, to the [facility] [place] at [facility]. Please see a copy of my visit note below.    Referral source: Established patient    Chief complaint: Multiple sclerosis    History of the Present Illness: Ms. Cindi Ferrer is a 24 year old right-handed woman who presents to the Multiple Sclerosis Clinic today for a scheduled follow up visit after MRI scans of the brain and cervical spine performed earlier on today's date.    The patient's history is as per my previous notes.  She initially developed symptoms of demyelinating disease in 2017 when she had horizontal diplopia and left hemibody numbness.  She was admitted to an outside pediatric facility at that time.  MRI scans demonstrated T2 hyperintense lesions suggestive of demyelinating disease of the type seen in multiple sclerosis, and a CSF examination was also positive for oligoclonal bands. She started disease-modifying therapy with natalizumab and did not have further MS relapses on that medication through 2024. We changed her treatment to ocrelizumab in June 2024 due to ADRIANA virus seroconversion. She remains on that medication to date, with the last treatment performed in January.    Unfortunately, she has had a difficult time over the past several months, with three hospitalizations:    1) She was admitted to an outside hospital from 5/27 to 5/29 with symptoms of afua, thought potentially to be an unusual reaction to an antibiotic versus initial manifestation of bipolar disorder.    2) She was admitted again from 6/20 to 6/23 after presenting to an outside ED with left foot drop and gait instability. In retrospect, she thinks that her gait problems started around the middle of June. She went to the ED when she started to feel that she  "needed to use her sister's crutches for stability. As discussed further below, MRI scan of the cervical spine was interpreted as showing evidence of active demyelination at C5-6. She received 3 days of high dose IV steroids with improvement in her symptoms.    3) She returned to our ED on 6/27 with new onset of urinary retention. Images of the brain and spinal cord here were interpreted as unchanged from 2023, with the abnormality found on the outside imaging not identified here. The patient also had a CSF exam on 7/28, which was bland with normal glucose, protein 12.5 mg/dL, and WBC count 0/uL. She did not receive further steroids or other acute therapy, and was discharged home on 6/29.    The patient indicates that since discharge her urinary retention has waxed and waned, and she intermittently needs to self-catheterize. Mobility issues have largely resolved.    In addition to the above, she indicates that she has had worsened \"light sensitivity\" since April. Her previous complaint of tremor recurred around that time as well. She describes this as, \"my hands get warm and tingly and then start twitching\". She is back on clonazepam 1 mg twice daily, which is controlling this symptom.     She was also discharged from the hospital on gabapentin 100 mg TID.    PHYSICAL EXAMINATION:  VITAL SIGNS: Blood pressure 105/68; pulse 73; weight 74.4 kg; respiratory rate 16 breaths/minute; oxygen saturation 98%.  GENERAL: Well-nourished woman who presents to the examination alone, seated in an exam room with the lights dimmed, awake and alert and in no acute distress.    NEUROLOGIC EXAMINATION:  CRANIAL NERVES: Visual fields are full to confrontation.  Extraocular movements are intact with no internuclear ophthalmoplegia.  Facial strength is normal.  Palate elevation and tongue protrusion are normal.  POWER: Strength is within normal limits in proximal and distal muscles in the upper and lower limbs throughout.  REFLEXES: " "Reflexes are symmetric and within normal limits in the arms and legs.  MOTOR/CEREBELLAR: There is no appendicular ataxia on finger-to-nose testing.  Rapid alternating movements are within normal limits in the hands and fingers.  There is no pronator drift in the arms.  GAIT: The patient is able to ambulate on a flat, level surface with no gross loss of postural stability, and able to walk on heels, toes and in tandem.    Investigations: I reviewed multiple MRI scans, including brain MRI scans from 6/20/2025, 6/27/2025, and today's date; cervical MRI scans from 6/20/2025, 6/27/2025, and today's date, and thoracic MRI of 6/27/2025. The following is my independent interpretation of those images. The thoracic MRI is normal. All three brain MRI scans show periventricular and infratentorial foci of T2 hyperintensity, which do not enhance. No new lesions are seen in comparison to earlier MRI of 4/13/2023. The cervical MRI dated 6/20/2025 shows an unusual pattern of curvilinear, punctate contrast enhancement at C5-6. There is no clearly identified corresponding abnormality at this level on T2 sequences. This finding is not seen on subsequent MRI scans from 6/27/2025 and today. Other chronic short segment foci of T2 hyperintensity are unchanged between these studies and in comparison to earlier MRI scan of 4/13/2023, with no other new or enhancing lesions seen.    Assessment/plan:    1. Multiple sclerosis  The apparent lesion in the cord identified on 6/20/2025, seen only on contrast enhanced images, was not evident on subsequent scans and radiologically this would be most suggestive of an imaging artifact.    That being said, her reported symptoms could localize to a lesion in the cord and I suppose it is within the realm of possibility that high dose steroids received during her first June hospitalization suppressed inflammation below the limits of MRI detection. Urinary retention is also a fairly unusual \"functional\" " "symptom.    Overall, I am not entirely certain if she had an MS relapse in June, or not. If this was the case, this would obviously be concerning and suggest failure of treatment with ocrelizumab.    I discussed alternate treatment options with her today, which include:    1) Alemtuzumab (Lemtrada): the standard treatment course of alemtuzumab over one year typically produces a \"remission\" of disease activity lasting for at least several years without need for further treatment. Although this profile is superficially attractive, there are numerous potential side effects including marked lymphopenia after treatments requiring Pneumocystis prophylaxis and valacyclovir prophylaxis, high incidence of other autoimmune diseases post-treatment, and a long term increase in risk of leukemia and lymphoma. Laboratory monitoring (blood and urine) is required monthly at onset of therapy and for four years after the last treatment.    2) Cladribine (Mavenclad): this is also a time-limited therapy given as two treatment courses over two years. This is typically considered a high efficacy MS therapy and could be an option for her, although my experience has been that sustained remissions of disease activity with this drug are not as predictable as with alemtuzumab. There is also considered to be an increased risk of cancer post-cladribine therapy, although this is largely dose-dependent and the actual magnitude of risk with the doses used for MS treatment is likely low.    Other MS therapies (oral and platform injectable drugs) are generally less effective than B cell depleting antibodies, and I would not favor one of these.    We also briefly discussed hematopoetic stem cell transplant; we are no longer enrolling subjects for the BEAT-MS trial at our center, and even if we consider the June event a clinical relapse at present she does not meet inclusion criteria for the trial.    After discussion, at present she is most " "comfortable with plan to remain on ocrelizumab. If this agent is not going to be adequate to control her disease, I expect that will become evident over the next 1-2 years and we will need to re-address the above options.    Today, I will check a CD19 count to make sure that she is not experiencing early reconstitution of B cells. She is at the end of a treatment \"cycle\", with her next ocrelizumab infusion due to be performed in two days.    I will see her back in 6 months for a review, or sooner as needed.    2. Tremor  I did not see any grossly evident movement abnormality today; she reports that this is suppressed by clonazepam. Her past complaints of tremor appeared to have a functional element, and I am not sure as to the nature of her current symptom. For now, she can continue clonazepam. I would advise trying to wean this as needed and as tolerated.    3. Light sensitivity  Gabapentin is sometimes helpful for this complaint and I told her that we could try increasing this, but she prefers to stay at 100 mg TID for now.    I spent a total of 45 minutes on patient care activities related to this encounter on the date of service, including time spent reviewing the chart, reviewing outside medical records, reviewing neuroimaging, obtaining history and examination from, and in counseling the patient.    The longitudinal plans of care for the diagnoses as documented were addressed during this visit. Due to the added complexity in care, I will continue to support the patient in subsequent management and with ongoing continuity of care.    ADDENDUM (7-): CD19 count was undetectable (<1 cell/uL), as desired.      Again, thank you for allowing me to participate in the care of your patient.      Sincerely,    Coy Wilson MD    "

## 2025-07-30 NOTE — PROGRESS NOTES
Referral source: Established patient    Chief complaint: Multiple sclerosis    History of the Present Illness: Ms. Cindi Ferrer is a 24 year old right-handed woman who presents to the Multiple Sclerosis Clinic today for a scheduled follow up visit after MRI scans of the brain and cervical spine performed earlier on today's date.    The patient's history is as per my previous notes.  She initially developed symptoms of demyelinating disease in 2017 when she had horizontal diplopia and left hemibody numbness.  She was admitted to an outside pediatric facility at that time.  MRI scans demonstrated T2 hyperintense lesions suggestive of demyelinating disease of the type seen in multiple sclerosis, and a CSF examination was also positive for oligoclonal bands. She started disease-modifying therapy with natalizumab and did not have further MS relapses on that medication through 2024. We changed her treatment to ocrelizumab in June 2024 due to ADRIANA virus seroconversion. She remains on that medication to date, with the last treatment performed in January.    Unfortunately, she has had a difficult time over the past several months, with three hospitalizations:    1) She was admitted to an outside hospital from 5/27 to 5/29 with symptoms of afua, thought potentially to be an unusual reaction to an antibiotic versus initial manifestation of bipolar disorder.    2) She was admitted again from 6/20 to 6/23 after presenting to an outside ED with left foot drop and gait instability. In retrospect, she thinks that her gait problems started around the middle of June. She went to the ED when she started to feel that she needed to use her sister's crutches for stability. As discussed further below, MRI scan of the cervical spine was interpreted as showing evidence of active demyelination at C5-6. She received 3 days of high dose IV steroids with improvement in her symptoms.    3) She returned to our ED on 6/27 with new onset of urinary  "retention. Images of the brain and spinal cord here were interpreted as unchanged from 2023, with the abnormality found on the outside imaging not identified here. The patient also had a CSF exam on 7/28, which was bland with normal glucose, protein 12.5 mg/dL, and WBC count 0/uL. She did not receive further steroids or other acute therapy, and was discharged home on 6/29.    The patient indicates that since discharge her urinary retention has waxed and waned, and she intermittently needs to self-catheterize. Mobility issues have largely resolved.    In addition to the above, she indicates that she has had worsened \"light sensitivity\" since April. Her previous complaint of tremor recurred around that time as well. She describes this as, \"my hands get warm and tingly and then start twitching\". She is back on clonazepam 1 mg twice daily, which is controlling this symptom.     She was also discharged from the hospital on gabapentin 100 mg TID.    PHYSICAL EXAMINATION:  VITAL SIGNS: Blood pressure 105/68; pulse 73; weight 74.4 kg; respiratory rate 16 breaths/minute; oxygen saturation 98%.  GENERAL: Well-nourished woman who presents to the examination alone, seated in an exam room with the lights dimmed, awake and alert and in no acute distress.    NEUROLOGIC EXAMINATION:  CRANIAL NERVES: Visual fields are full to confrontation.  Extraocular movements are intact with no internuclear ophthalmoplegia.  Facial strength is normal.  Palate elevation and tongue protrusion are normal.  POWER: Strength is within normal limits in proximal and distal muscles in the upper and lower limbs throughout.  REFLEXES: Reflexes are symmetric and within normal limits in the arms and legs.  MOTOR/CEREBELLAR: There is no appendicular ataxia on finger-to-nose testing.  Rapid alternating movements are within normal limits in the hands and fingers.  There is no pronator drift in the arms.  GAIT: The patient is able to ambulate on a flat, level " "surface with no gross loss of postural stability, and able to walk on heels, toes and in tandem.    Investigations: I reviewed multiple MRI scans, including brain MRI scans from 6/20/2025, 6/27/2025, and today's date; cervical MRI scans from 6/20/2025, 6/27/2025, and today's date, and thoracic MRI of 6/27/2025. The following is my independent interpretation of those images. The thoracic MRI is normal. All three brain MRI scans show periventricular and infratentorial foci of T2 hyperintensity, which do not enhance. No new lesions are seen in comparison to earlier MRI of 4/13/2023. The cervical MRI dated 6/20/2025 shows an unusual pattern of curvilinear, punctate contrast enhancement at C5-6. There is no clearly identified corresponding abnormality at this level on T2 sequences. This finding is not seen on subsequent MRI scans from 6/27/2025 and today. Other chronic short segment foci of T2 hyperintensity are unchanged between these studies and in comparison to earlier MRI scan of 4/13/2023, with no other new or enhancing lesions seen.    Assessment/plan:    1. Multiple sclerosis  The apparent lesion in the cord identified on 6/20/2025, seen only on contrast enhanced images, was not evident on subsequent scans and radiologically this would be most suggestive of an imaging artifact.    That being said, her reported symptoms could localize to a lesion in the cord and I suppose it is within the realm of possibility that high dose steroids received during her first June hospitalization suppressed inflammation below the limits of MRI detection. Urinary retention is also a fairly unusual \"functional\" symptom.    Overall, I am not entirely certain if she had an MS relapse in June, or not. If this was the case, this would obviously be concerning and suggest failure of treatment with ocrelizumab.    I discussed alternate treatment options with her today, which include:    1) Alemtuzumab (Lemtrada): the standard treatment " "course of alemtuzumab over one year typically produces a \"remission\" of disease activity lasting for at least several years without need for further treatment. Although this profile is superficially attractive, there are numerous potential side effects including marked lymphopenia after treatments requiring Pneumocystis prophylaxis and valacyclovir prophylaxis, high incidence of other autoimmune diseases post-treatment, and a long term increase in risk of leukemia and lymphoma. Laboratory monitoring (blood and urine) is required monthly at onset of therapy and for four years after the last treatment.    2) Cladribine (Mavenclad): this is also a time-limited therapy given as two treatment courses over two years. This is typically considered a high efficacy MS therapy and could be an option for her, although my experience has been that sustained remissions of disease activity with this drug are not as predictable as with alemtuzumab. There is also considered to be an increased risk of cancer post-cladribine therapy, although this is largely dose-dependent and the actual magnitude of risk with the doses used for MS treatment is likely low.    Other MS therapies (oral and platform injectable drugs) are generally less effective than B cell depleting antibodies, and I would not favor one of these.    We also briefly discussed hematopoetic stem cell transplant; we are no longer enrolling subjects for the BEAT-MS trial at our center, and even if we consider the Elizabet event a clinical relapse at present she does not meet inclusion criteria for the trial.    After discussion, at present she is most comfortable with plan to remain on ocrelizumab. If this agent is not going to be adequate to control her disease, I expect that will become evident over the next 1-2 years and we will need to re-address the above options.    Today, I will check a CD19 count to make sure that she is not experiencing early reconstitution of B cells. " "She is at the end of a treatment \"cycle\", with her next ocrelizumab infusion due to be performed in two days.    I will see her back in 6 months for a review, or sooner as needed.    2. Tremor  I did not see any grossly evident movement abnormality today; she reports that this is suppressed by clonazepam. Her past complaints of tremor appeared to have a functional element, and I am not sure as to the nature of her current symptom. For now, she can continue clonazepam. I would advise trying to wean this as needed and as tolerated.    3. Light sensitivity  Gabapentin is sometimes helpful for this complaint and I told her that we could try increasing this, but she prefers to stay at 100 mg TID for now.    I spent a total of 45 minutes on patient care activities related to this encounter on the date of service, including time spent reviewing the chart, reviewing outside medical records, reviewing neuroimaging, obtaining history and examination from, and in counseling the patient.    The longitudinal plans of care for the diagnoses as documented were addressed during this visit. Due to the added complexity in care, I will continue to support the patient in subsequent management and with ongoing continuity of care.    ADDENDUM (7-): CD19 count was undetectable (<1 cell/uL), as desired.  "

## 2025-08-08 ENCOUNTER — PRE VISIT (OUTPATIENT)
Dept: UROLOGY | Facility: CLINIC | Age: 25
End: 2025-08-08

## 2025-08-12 ENCOUNTER — TRANSCRIBE ORDERS (OUTPATIENT)
Dept: OTHER | Age: 25
End: 2025-08-12

## 2025-08-12 DIAGNOSIS — H50.00 ESOTROPIA: Primary | ICD-10-CM

## 2025-08-13 ENCOUNTER — PATIENT OUTREACH (OUTPATIENT)
Dept: CARE COORDINATION | Facility: CLINIC | Age: 25
End: 2025-08-13

## 2025-08-14 ENCOUNTER — MEDICAL CORRESPONDENCE (OUTPATIENT)
Dept: HEALTH INFORMATION MANAGEMENT | Facility: CLINIC | Age: 25
End: 2025-08-14

## 2025-08-18 ENCOUNTER — TELEPHONE (OUTPATIENT)
Dept: UROLOGY | Facility: CLINIC | Age: 25
End: 2025-08-18

## 2025-08-18 ENCOUNTER — DOCUMENTATION ONLY (OUTPATIENT)
Dept: NEUROLOGY | Facility: CLINIC | Age: 25
End: 2025-08-18

## (undated) DEVICE — CUFF FINGER CLEARSIGHT MED CSCM

## (undated) DEVICE — CUFF FINGER CLEARSIGHT SMALL CSCS

## (undated) RX ORDER — NITROGLYCERIN 0.4 MG/1
TABLET SUBLINGUAL
Status: DISPENSED
Start: 2023-12-29

## (undated) RX ORDER — ONDANSETRON 2 MG/ML
INJECTION INTRAMUSCULAR; INTRAVENOUS
Status: DISPENSED
Start: 2023-12-29

## (undated) RX ORDER — SODIUM CHLORIDE 9 MG/ML
INJECTION, SOLUTION INTRAVENOUS
Status: DISPENSED
Start: 2023-12-29